# Patient Record
Sex: MALE | Race: BLACK OR AFRICAN AMERICAN | NOT HISPANIC OR LATINO | Employment: OTHER | URBAN - METROPOLITAN AREA
[De-identification: names, ages, dates, MRNs, and addresses within clinical notes are randomized per-mention and may not be internally consistent; named-entity substitution may affect disease eponyms.]

---

## 2018-01-13 NOTE — RESULT NOTES
Verified Results  (1) TISSUE EXAM [id number] 03:00PM Carlos Youssef     Test Name Result Flag Reference   LAB AP CASE REPORT (Report)     Surgical Pathology Report             Case: [id number]                   Authorizing Provider: Howard Romano MD      Collected:      11/09/2016 1500        Ordering Location:   [address] [place] [address]   Received:      11/10/2016 602 [facility]                               Pathologist:      Dorothy Padilla MD                              Specimen:  Stomach, Antrum gastritis   LAB AP FINAL DIAGNOSIS (Report)     A  Stomach, antrum, biopsy:        - Antral mucosa with chronic inactive gastritis  - No Helicobacter pylori organisms are identified on the   immunohistochemical stain, performed with an appropriate positive control         - No intestinal metaplasia, dysplasia or malignancy is   identified  Interpretation performed at , [address]    Electronically signed by Dorothy Padilla MD on 11/14/2016 at 10:01 AM   LAB AP SURGICAL ADDITIONAL INFORMATION (Report)     These tests were developed and their performance characteristics   determined by [facility] or [address] [place] [address]  They may not be cleared or approved by the [place]  Food and   Drug Administration  The FDA has determined that such clearance or   approval is not necessary  These tests are used for clinical purposes  They should not be regarded as investigational or for research  This   laboratory has been approved by [place] 88, designated as a high-complexity   laboratory and is qualified to perform these tests  LAB AP GROSS DESCRIPTION (Report)     A  The specimen is received in formalin, labeled with the patient's name   and hospital number, and is designated antrum gastritis  The specimen   consists of 2 rubbery, tan-brown tissue fragments measuring from 0 2 up to   0 4 cm in greatest dimension   Entirely submitted  One cassette  Note: The estimated total formalin fixation time based upon information   provided by the submitting clinician and the standard processing schedule   is 37 5 hours      SRS   LAB AP CLINICAL INFORMATION Hx of h pylori     Hx of h pylori

## 2018-01-16 NOTE — RESULT NOTES
Verified Results  CT Pelvis Without Contrast [id number] 12:22PM Queenie Shellbalaji     Test Name Result Flag Reference   CT Pelvis W/O (Report)     [address] [facility];;[place];[id number]   09/28/2015 1212   09/28/2015 1231   N/A     CT PELVIS WITHOUT IV CONTRAST     INDICATION- Postop right inguinal hernia repair September 23, 2015     COMPARISON- September 5, 2015     TECHNIQUE- CT examination of the pelvis was performed without   intravenous contrast  Examination was performed utilizing techniques   to minimize radiation dose, including the use of dose reduction   software  Axial, sagittal and coronal reformatted images were   submitted for interpretation  Enteric contrast was not administered     FINDINGS-   VISUALIZED KIDNEYS/URETERS-  Unremarkable  No hydronephrosis  No   urinary tract calculi  REPRODUCTIVE ORGANS- Mildly enlarged prostate  Seminal vesicles are   symmetric  Bilateral hydroceles  URINARY BLADDER- Incompletely distended  Mild wall thickening   suggesting trabeculation  APPENDIX- No findings to suggest appendicitis  VISUALIZED BOWEL- Limited evaluation of GI tract without oral contrast    Colonic diverticulosis without evidence of diverticulitis     ABDOMINOPELVIC CAVITY- Irregularly-shaped fluid collection deep to the   right inguinal hernia repair site image 3/28 measuring 3 0 x 1 7 cm  No free air  No adenopathy  VISUALIZED VESSELS- Unremarkable for patient's age  ABDOMINOPELVIC WALL/INGUINAL REGIONS- There is a lobular intermediate   attenuation collection or grouping of collections in the right groin   the largest of these measuring 4 4 x 2 9 cm, each with layering   hematocrit levels compatible with subacute hematoma  No intralesional   gas to confirm an abscess  Without intravenous contrast, further   evaluation is not possible  The largest collection appears to be   within or adjacent to the spermatic cord       OSSEOUS STRUCTURES- No acute fracture or destructive osseous lesion  IMPRESSION- Limited evaluation performed without oral or intravenous   contrast    Subacute hematoma in the right groin as described above  Probable   small seroma deep to the abdominal wall musculature  Superinfection   cannot be excluded by imaging       Discussed with consulting surgical resident at 1-40 PM     Transcribed on- [address] [employer] [facility], [address] [place] [address], [facility] DO   Electronically [address], [facility] DO   Released Date Time- 09/28/15 1345   ------------------------------------------------------------------------------   9724^MANUEL THURSTON   9724^MANUEL THURSTON

## 2018-09-05 ENCOUNTER — HOSPITAL ENCOUNTER (EMERGENCY)
Facility: HOSPITAL | Age: 48
Discharge: HOME | End: 2018-09-05
Attending: EMERGENCY MEDICINE
Payer: COMMERCIAL

## 2018-09-05 ENCOUNTER — APPOINTMENT (EMERGENCY)
Dept: CT IMAGING | Facility: HOSPITAL | Age: 48
End: 2018-09-05
Payer: COMMERCIAL

## 2018-09-05 VITALS
WEIGHT: 180 LBS | HEART RATE: 59 BPM | DIASTOLIC BLOOD PRESSURE: 80 MMHG | SYSTOLIC BLOOD PRESSURE: 131 MMHG | TEMPERATURE: 98.1 F | HEIGHT: 70 IN | RESPIRATION RATE: 18 BRPM | BODY MASS INDEX: 25.77 KG/M2 | OXYGEN SATURATION: 100 %

## 2018-09-05 DIAGNOSIS — K29.70 GASTRITIS: Primary | ICD-10-CM

## 2018-09-05 LAB
ALBUMIN SERPL BCP-MCNC: 3.8 G/DL (ref 3.5–5)
ALP SERPL-CCNC: 80 U/L (ref 46–116)
ALT SERPL W P-5'-P-CCNC: 20 U/L (ref 12–78)
ANION GAP SERPL CALCULATED.3IONS-SCNC: 4 MMOL/L (ref 4–13)
AST SERPL W P-5'-P-CCNC: 19 U/L (ref 5–45)
BASOPHILS # BLD AUTO: 0.02 THOUSANDS/ΜL (ref 0–0.1)
BASOPHILS NFR BLD AUTO: 0 % (ref 0–1)
BILIRUB DIRECT SERPL-MCNC: 0.26 MG/DL (ref 0–0.2)
BILIRUB SERPL-MCNC: 1.33 MG/DL (ref 0.2–1)
BUN SERPL-MCNC: 12 MG/DL (ref 5–25)
CALCIUM SERPL-MCNC: 9.4 MG/DL (ref 8.3–10.1)
CHLORIDE SERPL-SCNC: 103 MMOL/L (ref 100–108)
CO2 SERPL-SCNC: 31 MMOL/L (ref 21–32)
CREAT SERPL-MCNC: 1.24 MG/DL (ref 0.6–1.3)
EOSINOPHIL # BLD AUTO: 0.05 THOUSAND/ΜL (ref 0–0.61)
EOSINOPHIL NFR BLD AUTO: 1 % (ref 0–6)
ERYTHROCYTE [DISTWIDTH] IN BLOOD BY AUTOMATED COUNT: 14.1 % (ref 11.6–15.1)
GFR SERPL CREATININE-BSD FRML MDRD: 78 ML/MIN/1.73SQ M
GLUCOSE SERPL-MCNC: 121 MG/DL (ref 65–140)
HCT VFR BLD AUTO: 41.2 % (ref 36.5–49.3)
HGB BLD-MCNC: 13.1 G/DL (ref 12–17)
IMM GRANULOCYTES # BLD AUTO: 0.01 THOUSAND/UL (ref 0–0.2)
IMM GRANULOCYTES NFR BLD AUTO: 0 % (ref 0–2)
LIPASE SERPL-CCNC: 328 U/L (ref 73–393)
LYMPHOCYTES # BLD AUTO: 2.41 THOUSANDS/ΜL (ref 0.6–4.47)
LYMPHOCYTES NFR BLD AUTO: 43 % (ref 14–44)
MCH RBC QN AUTO: 27.1 PG (ref 26.8–34.3)
MCHC RBC AUTO-ENTMCNC: 31.8 G/DL (ref 31.4–37.4)
MCV RBC AUTO: 85 FL (ref 82–98)
MONOCYTES # BLD AUTO: 0.42 THOUSAND/ΜL (ref 0.17–1.22)
MONOCYTES NFR BLD AUTO: 7 % (ref 4–12)
NEUTROPHILS # BLD AUTO: 2.76 THOUSANDS/ΜL (ref 1.85–7.62)
NEUTS SEG NFR BLD AUTO: 49 % (ref 43–75)
NRBC BLD AUTO-RTO: 0 /100 WBCS
PLATELET # BLD AUTO: 301 THOUSANDS/UL (ref 149–390)
PMV BLD AUTO: 9.5 FL (ref 8.9–12.7)
POTASSIUM SERPL-SCNC: 4.2 MMOL/L (ref 3.5–5.3)
PROT SERPL-MCNC: 7.9 G/DL (ref 6.4–8.2)
RBC # BLD AUTO: 4.83 MILLION/UL (ref 3.88–5.62)
SODIUM SERPL-SCNC: 138 MMOL/L (ref 136–145)
WBC # BLD AUTO: 5.67 THOUSAND/UL (ref 4.31–10.16)

## 2018-09-05 PROCEDURE — 85025 COMPLETE CBC W/AUTO DIFF WBC: CPT | Performed by: PHYSICIAN ASSISTANT

## 2018-09-05 PROCEDURE — 74177 CT ABD & PELVIS W/CONTRAST: CPT

## 2018-09-05 PROCEDURE — 99284 EMERGENCY DEPT VISIT MOD MDM: CPT

## 2018-09-05 PROCEDURE — 36415 COLL VENOUS BLD VENIPUNCTURE: CPT | Performed by: PHYSICIAN ASSISTANT

## 2018-09-05 PROCEDURE — 96360 HYDRATION IV INFUSION INIT: CPT

## 2018-09-05 PROCEDURE — 82248 BILIRUBIN DIRECT: CPT | Performed by: PHYSICIAN ASSISTANT

## 2018-09-05 PROCEDURE — 96361 HYDRATE IV INFUSION ADD-ON: CPT

## 2018-09-05 PROCEDURE — 83690 ASSAY OF LIPASE: CPT | Performed by: PHYSICIAN ASSISTANT

## 2018-09-05 PROCEDURE — 80053 COMPREHEN METABOLIC PANEL: CPT | Performed by: PHYSICIAN ASSISTANT

## 2018-09-05 RX ORDER — ALBUTEROL SULFATE 2.5 MG/3ML
2.5 SOLUTION RESPIRATORY (INHALATION) ONCE
Status: COMPLETED | OUTPATIENT
Start: 2018-09-05 | End: 2018-09-05

## 2018-09-05 RX ORDER — NICOTINE POLACRILEX 4 MG/1
20 GUM, CHEWING ORAL DAILY
COMMUNITY
Start: 2017-12-26 | End: 2018-11-28 | Stop reason: ALTCHOICE

## 2018-09-05 RX ORDER — EMTRICITABINE AND TENOFOVIR DISOPROXIL FUMARATE 200; 300 MG/1; MG/1
1 TABLET, FILM COATED ORAL EVERY 24 HOURS
COMMUNITY
End: 2018-11-28 | Stop reason: SDUPTHER

## 2018-09-05 RX ORDER — HYDROXYZINE PAMOATE 50 MG/1
50 CAPSULE ORAL DAILY
Status: ON HOLD | COMMUNITY
Start: 2015-04-08 | End: 2019-06-13 | Stop reason: ALTCHOICE

## 2018-09-05 RX ORDER — PANTOPRAZOLE SODIUM 20 MG/1
20 TABLET, DELAYED RELEASE ORAL DAILY
Qty: 20 TABLET | Refills: 0 | Status: SHIPPED | OUTPATIENT
Start: 2018-09-05 | End: 2018-11-26

## 2018-09-05 RX ORDER — HYDROCHLOROTHIAZIDE 12.5 MG/1
12.5 CAPSULE, GELATIN COATED ORAL EVERY 24 HOURS
COMMUNITY
Start: 2017-09-07 | End: 2018-11-28 | Stop reason: ALTCHOICE

## 2018-09-05 RX ADMIN — IOHEXOL 100 ML: 350 INJECTION, SOLUTION INTRAVENOUS at 13:17

## 2018-09-05 RX ADMIN — SODIUM CHLORIDE 1000 ML: 0.9 INJECTION, SOLUTION INTRAVENOUS at 12:05

## 2018-09-05 RX ADMIN — ALBUTEROL SULFATE 2.5 MG: 2.5 SOLUTION RESPIRATORY (INHALATION) at 12:04

## 2018-09-05 NOTE — DISCHARGE INSTRUCTIONS
Gastritis   WHAT YOU NEED TO KNOW:   Gastritis is inflammation or irritation of the lining of your stomach  DISCHARGE INSTRUCTIONS:   Call 911 for any of the following:   · You develop chest pain or shortness of breath  Return to the emergency department if:   · You vomit blood  · You have black or bloody bowel movements  · You have severe stomach or back pain  Contact your healthcare provider if:   · You have a fever  · You have new or worsening symptoms, even after treatment  · You have questions or concerns about your condition or care  Medicines:   · Medicines  may be given to help treat a bacterial infection or decrease stomach acid  · Take your medicine as directed  Contact your healthcare provider if you think your medicine is not helping or if you have side effects  Tell him or her if you are allergic to any medicine  Keep a list of the medicines, vitamins, and herbs you take  Include the amounts, and when and why you take them  Bring the list or the pill bottles to follow-up visits  Carry your medicine list with you in case of an emergency  Manage or prevent gastritis:   · Do not smoke  Nicotine and other chemicals in cigarettes and cigars can make your symptoms worse and cause lung damage  Ask your healthcare provider for information if you currently smoke and need help to quit  E-cigarettes or smokeless tobacco still contain nicotine  Talk to your healthcare provider before you use these products  · Do not drink alcohol  Alcohol can prevent healing and make your gastritis worse  Talk to your healthcare provider if you need help to stop drinking  · Do not take NSAIDs or aspirin unless directed  These and similar medicines can cause irritation  If your healthcare provider says it is okay to take NSAIDs, take them with food  · Do not eat foods that cause irritation  Foods such as oranges and salsa can cause burning or pain  Eat a variety of healthy foods   Examples include fruits (not citrus), vegetables, low-fat dairy products, beans, whole-grain breads, and lean meats and fish  Try to eat small meals, and drink water with your meals  Do not eat for at least 3 hours before you go to bed  · Find ways to relax and decrease stress  Stress can increase stomach acid and make gastritis worse  Activities such as yoga, meditation, or listening to music can help you relax  Spend time with friends, or do things you enjoy  Follow up with your healthcare provider as directed: You may need ongoing tests or treatment, or referral to a gastroenterologist  Write down your questions so you remember to ask them during your visits  © 2017 [facility] [facility] Information is for End User's use only and may not be sold, redistributed or otherwise used for commercial purposes  All illustrations and images included in CareNotes® are the copyrighted property of [employer]  or Bryan Villasenor  The above information is an  only  It is not intended as medical advice for individual conditions or treatments  Talk to your doctor, nurse or pharmacist before following any medical regimen to see if it is safe and effective for you

## 2018-09-05 NOTE — ED PROVIDER NOTES
History  Chief Complaint   Patient presents with    Allergic Reaction     Pt is allergic to cod fish and ate it yesterday at dinner  Pt seen at [facility] yesterday and given benadryl and another med he can't remember  Is now c/o of nausea/vomiting x1  Patient is a 49-year-old male with a history of asthma, PUD, and fish allergy presenting to the emergency department for consistant non radiating achy abdominal pain for one day after consuming cod fish last night  Patient reports after consuming the cod fish, he went to an urgent care, "before I would have a reaction", where he was treated with Benadryl  Patient states that after consuming fish products, "my throat closes up and it is difficult for me to breathe "  This morning, patient states that he was nauseous which gave him SOB  After his 3 bouts of nonbloody diarrhea this morning along with some blood in his vomitus, SOB improved  Patient also reports that he is currently without his rescue inhaler for the last couple days  Patient denies fever, chills, and sick contacts  Denies recent travel or trauma  No urinary complaints  No chest pain  Patient is currently in no acute distress  History provided by:  Patient   used: No    Allergic Reaction   Presenting symptoms: no rash    Presenting symptoms comment:  Nausea, vomiting, and diarrhea  Severity:  Mild  Duration:  1 day  Prior allergic episodes:  Food/nut allergies (fish-derived products, pollen extract, watermelon, and nuts)  Context: food and nuts    Relieved by:  Nothing (Benedryl)  Worsened by:  Nothing  Ineffective treatments:  None tried      Prior to Admission Medications   Prescriptions Last Dose Informant Patient Reported? Taking?    DIVALPROEX SODIUM PO   Yes Yes   Sig: Take 25 mg by mouth 3 (three) times a day   METFORMIN HCL PO   Yes Yes   Sig: Take 850 mg by mouth 2 (two) times a day     Omeprazole 20 MG TBEC   Yes Yes   Sig: Take 20 mg by mouth daily emtricitabine-tenofovir (TRUVADA) 200-300 mg per tablet   Yes No   Sig: Take 1 tablet by mouth every 24 hours   hydrOXYzine pamoate (VISTARIL) 50 mg capsule   Yes Yes   Sig: Take 50 mg by mouth daily   hydrochlorothiazide (MICROZIDE) 12 5 mg capsule   Yes Yes   Sig: Take 12 5 mg by mouth every 24 hours   mirtazapine (REMERON) 15 mg tablet   Yes Yes   Sig: Take 15 mg by mouth daily at bedtime   risperiDONE (RisperDAL) 4 mg tablet   Yes Yes   Sig: Take 4 mg by mouth daily     rivaroxaban (XARELTO) 20 mg tablet   Yes Yes   Sig: Take 20 mg by mouth every 24 hours      Facility-Administered Medications: None       Past Medical History:   Diagnosis Date    Anemia     Anxiety     Asthma     Depression     Diabetes mellitus ([email or web address] 75 )     Epilepsy ([email or web address] 75 )     Hypertension     Psychiatric disorder     bipolar    Psychiatric illness     Psychosis     Sleep apnea        Past Surgical History:   Procedure Laterality Date    CYST REMOVAL      ESOPHAGOGASTRODUODENOSCOPY N/A 11/9/2016    Procedure: ESOPHAGOGASTRODUODENOSCOPY (EGD); Surgeon: Natali Garcia MD;  Location: [facility]; Service:     ESOPHAGOGASTRODUODENOSCOPY N/A 9/2/2016    Procedure: ESOPHAGOGASTRODUODENOSCOPY (EGD); Surgeon: Natali Garcia MD;  Location: [facility];   Service:     HERNIA REPAIR         Family History   Problem Relation Age of Onset    No Known Problems Mother     No Known Problems Father     No Known Problems Sister     No Known Problems Brother     No Known Problems Maternal Aunt     No Known Problems Paternal Aunt     No Known Problems Maternal Uncle     No Known Problems Paternal Uncle     No Known Problems Maternal Grandfather     No Known Problems Maternal Grandmother     No Known Problems Paternal Grandfather     No Known Problems Paternal Grandmother     No Known Problems Cousin     ADD / ADHD Neg Hx     Alcohol abuse Neg Hx     Anxiety disorder Neg Hx     Bipolar disorder Neg Hx     Dementia Neg Hx     Depression Neg Hx     Drug abuse Neg Hx     OCD Neg Hx     Paranoid behavior Neg Hx     Schizophrenia Neg Hx     Seizures Neg Hx     Self-Injury Neg Hx     Suicide Attempts Neg Hx      I have reviewed and agree with the history as documented  Social History   Substance Use Topics    Smoking status: Current Every Day Smoker     Packs/day: 0 20     Years: 15 00    Smokeless tobacco: Never Used      Comment: smokes occasionally    Alcohol use Yes      Comment: 5 or more cans beers daily        Review of Systems   Constitutional: Negative for activity change, chills, diaphoresis, fatigue and fever  HENT: Negative for congestion and sore throat  Respiratory: Negative for cough, chest tightness and shortness of breath  Cardiovascular: Negative for chest pain and palpitations  Gastrointestinal: Positive for abdominal pain, diarrhea and nausea  Negative for blood in stool, constipation, rectal pain and vomiting  Genitourinary: Negative for difficulty urinating, dysuria, frequency and hematuria  Musculoskeletal: Negative for back pain and gait problem  Skin: Negative for pallor and rash  Allergic/Immunologic: Negative for environmental allergies and food allergies  Neurological: Negative for dizziness, numbness and headaches  Psychiatric/Behavioral: Negative for agitation and confusion  All other systems reviewed and are negative  Physical Exam  Physical Exam   Constitutional: He is oriented to person, place, and time  He appears well-developed and well-nourished  HENT:   Head: Normocephalic and atraumatic  Eyes: Conjunctivae and EOM are normal  Pupils are equal, round, and reactive to light  Neck: Trachea normal, normal range of motion and phonation normal  Neck supple  No JVD present  No tracheal deviation present  Cardiovascular: Normal rate, regular rhythm, normal heart sounds, intact distal pulses and normal pulses      Pulses:       Radial pulses are 2+ on the right side, and 2+ on the left side  Pulmonary/Chest: Effort normal  He has wheezes in the right middle field, the right lower field, the left middle field and the left lower field  Abdominal: Soft  Normal appearance  Bowel sounds are increased  There is tenderness in the right upper quadrant  There is positive Rand's sign  Musculoskeletal: Normal range of motion  Neurological: He is alert and oriented to person, place, and time  He has normal strength  Skin: Skin is warm  Capillary refill takes 2 to 3 seconds  No rash noted  Psychiatric: He has a normal mood and affect   His speech is normal and behavior is normal  Judgment and thought content normal  Cognition and memory are normal        Vital Signs  ED Triage Vitals [09/05/18 1104]   Temperature Pulse Respirations Blood Pressure SpO2   98 1 °F (36 7 °C) 59 18 131/80 100 %      Temp Source Heart Rate Source Patient Position - Orthostatic VS BP Location FiO2 (%)   Temporal Monitor Lying Left arm --      Pain Score       No Pain           Vitals:    09/05/18 1104   BP: 131/80   Pulse: 59   Patient Position - Orthostatic VS: Lying       Visual Acuity      ED Medications  Medications   albuterol inhalation solution 2 5 mg (2 5 mg Nebulization Given 9/5/18 1204)   sodium chloride 0 9 % bolus 1,000 mL (1,000 mL Intravenous New Bag 9/5/18 1205)   iohexol (OMNIPAQUE) 350 MG/ML injection (MULTI-DOSE) 100 mL (100 mL Intravenous Given 9/5/18 1317)       Diagnostic Studies  Results Reviewed     Procedure Component Value Units Date/Time    CMP [[id number]]  (Abnormal) Collected:  09/05/18 1202    Lab Status:  Final result Specimen:  Blood from Arm, Right Updated:  09/05/18 1227     Sodium 138 mmol/L      Potassium 4 2 mmol/L      Chloride 103 mmol/L      CO2 31 mmol/L      ANION GAP 4 mmol/L      BUN 12 mg/dL      Creatinine 1 24 mg/dL      Glucose 121 mg/dL      Calcium 9 4 mg/dL      AST 19 U/L      ALT 20 U/L      Alkaline Phosphatase 80 U/L      Total Protein 7 9 g/dL      Albumin 3 8 g/dL      Total Bilirubin 1 33 (H) mg/dL      eGFR 78 ml/min/1 73sq m     Narrative:         National Kidney Disease Education Program recommendations are as follows:  GFR calculation is accurate only with a steady state creatinine  Chronic Kidney disease less than 60 ml/min/1 73 sq  meters  Kidney failure less than 15 ml/min/1 73 sq  meters  Lipase [[id number]]  (Normal) Collected:  09/05/18 1202    Lab Status:  Final result Specimen:  Blood from Arm, Right Updated:  09/05/18 1227     Lipase 328 u/L     Bilirubin, direct [[id number]]  (Abnormal) Collected:  09/05/18 1202    Lab Status:  Final result Specimen:  Blood from Arm, Right Updated:  09/05/18 1227     Bilirubin, Direct 0 26 (H) mg/dL     CBC and differential [[id number]] Collected:  09/05/18 1202    Lab Status:  Final result Specimen:  Blood from Arm, Right Updated:  09/05/18 1223     WBC 5 67 Thousand/uL      RBC 4 83 Million/uL      Hemoglobin 13 1 g/dL      Hematocrit 41 2 %      MCV 85 fL      MCH 27 1 pg      MCHC 31 8 g/dL      RDW 14 1 %      MPV 9 5 fL      Platelets 417 Thousands/uL      nRBC 0 /100 WBCs      Neutrophils Relative 49 %      Immat GRANS % 0 %      Lymphocytes Relative 43 %      Monocytes Relative 7 %      Eosinophils Relative 1 %      Basophils Relative 0 %      Neutrophils Absolute 2 76 Thousands/µL      Immature Grans Absolute 0 01 Thousand/uL      Lymphocytes Absolute 2 41 Thousands/µL      Monocytes Absolute 0 42 Thousand/µL      Eosinophils Absolute 0 05 Thousand/µL      Basophils Absolute 0 02 Thousands/µL                  CT abdomen pelvis with contrast   Final Result by Anum Pizarro MD (09/05 1350)      No acute abnormality in the abdomen or pelvis  Workstation performed: [id number]                    Procedures  Procedures       Phone Contacts  ED Phone Contact    ED Course  ED Course as of Sep 05 1409   Wed Sep 05, 2018   1234 Patient wheezing improved after albuterol treatment    Patient asked me for something to eat  I declined at this point  1 Patient brother and girlfriend came to visit                                   MDM  Number of Diagnoses or Management Options  Diagnosis management comments: Differential diagnosis encompasses but is not limited to:  Cholecystitis  Gastroenteritis  Peptic ulcer disease  Gastritis  Pancreatitis     Clinical labs normal  CT Abdomen/Pelvis with contrast - Impression - No acute abnormality in the abdomen or pelvis  Patient counseled on avoiding allergic trigger foods  F/u with GI  F/U with PCP  F/u with ED if problems persist or exacerbate                  Amount and/or Complexity of Data Reviewed  Clinical lab tests: ordered and reviewed  Tests in the radiology section of CPT®: ordered and reviewed      CritCare Time    Disposition  Final diagnoses:   Gastritis     Time reflects when diagnosis was documented in both MDM as applicable and the Disposition within this note     Time User Action Codes Description Comment    9/5/2018  2:02 PM Kinjal Caldwell Add [K29 70] Gastritis       ED Disposition     ED Disposition Condition Comment    Discharge  Al Yates discharge to home/self care      Condition at discharge: Stable        Follow-up Information     Follow up With Specialties Details Why Contact Info Additional [address] [facility] Emergency Department Emergency Medicine Go to As needed [address] [facility] ED, [address] [facility], [address]    Sandy Garcia MD  In 1 week for further evaluation of symptoms [address] [facility]       Christina Montano MD Gastroenterology In 2 days for further evaluation of symptoms [address] [facility] [address] [place] [facility] [address]  [phone]             Patient's Medications   Discharge Prescriptions    PANTOPRAZOLE (PROTONIX) 20 MG TABLET    Take 1 tablet (20 mg total) by mouth daily       Start Date: 9/5/2018  End Date: --       Order Dose: 20 mg       Quantity: 20 tablet    Refills: 0     No discharge procedures on file      ED Provider  Electronically Signed by           Luis Quinonez PA-C  09/05/18 2509 no tinnitus

## 2018-09-21 ENCOUNTER — TELEPHONE (OUTPATIENT)
Dept: FAMILY MEDICINE CLINIC | Facility: CLINIC | Age: 48
End: 2018-09-21

## 2018-09-21 DIAGNOSIS — I82.4Y1 DVT, LOWER EXTREMITY, PROXIMAL, ACUTE, RIGHT (HCC): ICD-10-CM

## 2018-09-21 DIAGNOSIS — D68.59 PROTEIN S DEFICIENCY (HCC): Primary | ICD-10-CM

## 2018-11-26 ENCOUNTER — TELEPHONE (OUTPATIENT)
Dept: FAMILY MEDICINE CLINIC | Facility: CLINIC | Age: 48
End: 2018-11-26

## 2018-11-26 ENCOUNTER — HOSPITAL ENCOUNTER (EMERGENCY)
Facility: HOSPITAL | Age: 48
Discharge: HOME | End: 2018-11-26
Attending: EMERGENCY MEDICINE | Admitting: EMERGENCY MEDICINE
Payer: COMMERCIAL

## 2018-11-26 VITALS
SYSTOLIC BLOOD PRESSURE: 114 MMHG | DIASTOLIC BLOOD PRESSURE: 72 MMHG | WEIGHT: 171 LBS | OXYGEN SATURATION: 98 % | RESPIRATION RATE: 18 BRPM | TEMPERATURE: 97.6 F | HEART RATE: 78 BPM | BODY MASS INDEX: 24.54 KG/M2

## 2018-11-26 DIAGNOSIS — G62.9 PERIPHERAL NEUROPATHY: ICD-10-CM

## 2018-11-26 DIAGNOSIS — E11.9 TYPE 2 DIABETES MELLITUS WITHOUT COMPLICATION, WITHOUT LONG-TERM CURRENT USE OF INSULIN (HCC): ICD-10-CM

## 2018-11-26 DIAGNOSIS — R55 NEAR SYNCOPE: Primary | ICD-10-CM

## 2018-11-26 LAB
ALBUMIN SERPL BCP-MCNC: 4.1 G/DL (ref 3–5.2)
ALP SERPL-CCNC: 65 U/L (ref 43–122)
ALT SERPL W P-5'-P-CCNC: 26 U/L (ref 9–52)
AMPHETAMINES SERPL QL SCN: POSITIVE
ANION GAP SERPL CALCULATED.3IONS-SCNC: 8 MMOL/L (ref 5–14)
AST SERPL W P-5'-P-CCNC: 28 U/L (ref 17–59)
ATRIAL RATE: 83 BPM
BACTERIA UR QL AUTO: ABNORMAL /HPF
BARBITURATES UR QL: NEGATIVE
BASOPHILS # BLD AUTO: 0 THOUSANDS/ΜL (ref 0–0.1)
BASOPHILS NFR BLD AUTO: 1 % (ref 0–1)
BENZODIAZ UR QL: NEGATIVE
BILIRUB SERPL-MCNC: 0.6 MG/DL
BILIRUB UR QL STRIP: NEGATIVE
BUN SERPL-MCNC: 17 MG/DL (ref 5–25)
CALCIUM SERPL-MCNC: 9.3 MG/DL (ref 8.4–10.2)
CHLORIDE SERPL-SCNC: 101 MMOL/L (ref 97–108)
CLARITY UR: CLEAR
CO2 SERPL-SCNC: 26 MMOL/L (ref 22–30)
COCAINE UR QL: NEGATIVE
COLOR UR: YELLOW
CREAT SERPL-MCNC: 1 MG/DL (ref 0.7–1.5)
EOSINOPHIL # BLD AUTO: 0.1 THOUSAND/ΜL (ref 0–0.4)
EOSINOPHIL NFR BLD AUTO: 2 % (ref 0–6)
ERYTHROCYTE [DISTWIDTH] IN BLOOD BY AUTOMATED COUNT: 14 %
GFR SERPL CREATININE-BSD FRML MDRD: 101 ML/MIN/1.73SQ M
GLUCOSE SERPL-MCNC: 104 MG/DL (ref 70–99)
GLUCOSE SERPL-MCNC: 86 MG/DL (ref 70–99)
GLUCOSE UR STRIP-MCNC: NEGATIVE MG/DL
HCT VFR BLD AUTO: 43.6 % (ref 41–53)
HGB BLD-MCNC: 13.7 G/DL (ref 13.5–17.5)
HGB UR QL STRIP.AUTO: NEGATIVE
KETONES UR STRIP-MCNC: NEGATIVE MG/DL
LEUKOCYTE ESTERASE UR QL STRIP: 25
LYMPHOCYTES # BLD AUTO: 2.8 THOUSANDS/ΜL (ref 0.5–4)
LYMPHOCYTES NFR BLD AUTO: 54 % (ref 20–50)
MCH RBC QN AUTO: 27 PG (ref 26–34)
MCHC RBC AUTO-ENTMCNC: 31.5 G/DL (ref 31–36)
MCV RBC AUTO: 86 FL (ref 80–100)
METHADONE UR QL: NEGATIVE
MONOCYTES # BLD AUTO: 0.4 THOUSAND/ΜL (ref 0.2–0.9)
MONOCYTES NFR BLD AUTO: 9 % (ref 1–10)
NEUTROPHILS # BLD AUTO: 1.8 THOUSANDS/ΜL (ref 1.8–7.8)
NEUTS SEG NFR BLD AUTO: 36 % (ref 45–65)
NITRITE UR QL STRIP: NEGATIVE
NON-SQ EPI CELLS URNS QL MICRO: ABNORMAL /HPF
OPIATES UR QL SCN: NEGATIVE
P AXIS: 64 DEGREES
PCP UR QL: NEGATIVE
PH UR STRIP.AUTO: 6 [PH] (ref 4.5–8)
PLATELET # BLD AUTO: 316 THOUSANDS/UL (ref 150–450)
PMV BLD AUTO: 8.3 FL (ref 8.9–12.7)
POTASSIUM SERPL-SCNC: 4.6 MMOL/L (ref 3.6–5)
PR INTERVAL: 148 MS
PROT SERPL-MCNC: 7.2 G/DL (ref 5.9–8.4)
PROT UR STRIP-MCNC: NEGATIVE MG/DL
QRS AXIS: 68 DEGREES
QRSD INTERVAL: 76 MS
QT INTERVAL: 346 MS
QTC INTERVAL: 406 MS
RBC # BLD AUTO: 5.08 MILLION/UL (ref 4.5–5.9)
RBC #/AREA URNS AUTO: ABNORMAL /HPF
SODIUM SERPL-SCNC: 135 MMOL/L (ref 137–147)
SP GR UR STRIP.AUTO: 1.01 (ref 1–1.04)
T WAVE AXIS: 63 DEGREES
THC UR QL: NEGATIVE
UROBILINOGEN UA: NEGATIVE MG/DL
VENTRICULAR RATE: 83 BPM
WBC # BLD AUTO: 5.2 THOUSAND/UL (ref 4.5–11)
WBC #/AREA URNS AUTO: ABNORMAL /HPF

## 2018-11-26 PROCEDURE — 99284 EMERGENCY DEPT VISIT MOD MDM: CPT

## 2018-11-26 PROCEDURE — 96360 HYDRATION IV INFUSION INIT: CPT

## 2018-11-26 PROCEDURE — 80053 COMPREHEN METABOLIC PANEL: CPT | Performed by: EMERGENCY MEDICINE

## 2018-11-26 PROCEDURE — 36415 COLL VENOUS BLD VENIPUNCTURE: CPT | Performed by: EMERGENCY MEDICINE

## 2018-11-26 PROCEDURE — 93010 ELECTROCARDIOGRAM REPORT: CPT | Performed by: INTERNAL MEDICINE

## 2018-11-26 PROCEDURE — 85025 COMPLETE CBC W/AUTO DIFF WBC: CPT | Performed by: EMERGENCY MEDICINE

## 2018-11-26 PROCEDURE — 80061 LIPID PANEL: CPT

## 2018-11-26 PROCEDURE — 82043 UR ALBUMIN QUANTITATIVE: CPT

## 2018-11-26 PROCEDURE — 93005 ELECTROCARDIOGRAM TRACING: CPT

## 2018-11-26 PROCEDURE — 82948 REAGENT STRIP/BLOOD GLUCOSE: CPT

## 2018-11-26 PROCEDURE — 82570 ASSAY OF URINE CREATININE: CPT

## 2018-11-26 PROCEDURE — 81001 URINALYSIS AUTO W/SCOPE: CPT | Performed by: EMERGENCY MEDICINE

## 2018-11-26 PROCEDURE — 84443 ASSAY THYROID STIM HORMONE: CPT

## 2018-11-26 PROCEDURE — 80307 DRUG TEST PRSMV CHEM ANLYZR: CPT | Performed by: EMERGENCY MEDICINE

## 2018-11-26 RX ORDER — GABAPENTIN 100 MG/1
300 CAPSULE ORAL ONCE
Status: COMPLETED | OUTPATIENT
Start: 2018-11-26 | End: 2018-11-26

## 2018-11-26 RX ADMIN — GABAPENTIN 300 MG: 100 CAPSULE ORAL at 12:23

## 2018-11-26 RX ADMIN — SODIUM CHLORIDE 1000 ML: 9 INJECTION, SOLUTION INTRAVENOUS at 10:44

## 2018-11-26 NOTE — DISCHARGE INSTRUCTIONS
Syncope   AMBULATORY CARE:   Syncope  is also called fainting or passing out  Syncope is a sudden, temporary loss of consciousness, followed by a fall from a standing or sitting position  Syncope ranges from not serious to a sign of a more serious condition that needs to be treated  You can control some health conditions that cause syncope  Your healthcare providers can help you create a plan to manage syncope and prevent episodes  Signs and symptoms that may occur before syncope include the following:   · Cold, clammy, and sweaty skin     · Fast breathing and a racing, pounding heartbeat     · Feeling more tired than usual     · Nausea, a warm feeling, and sweating    · A headache, or feeling lightheaded or dizzy    · Tingling sensation or numbness     · Spots in front of your eyes, blurred vision, or double vision  Seek care immediately if:   · You are bleeding because you hit your head when you fainted  · You suddenly have double vision, difficulty speaking, numbness, and cannot move your arms or legs  · You have chest pain and trouble breathing  · You vomit blood or material that looks like coffee grounds  · You see blood in your bowel movement  Contact your healthcare provider if:   · You have new or worsening symptoms  · You have another syncope episode  · You have a headache, fast heartbeat, or feel too dizzy to stand up  · You have questions or concerns about your condition or care  Treatment for syncope  depends on the cause of your syncope  To prevent syncope from happening again, you may  need any of the following:  · Medicines  may be needed to treat any medical conditions that are causing your syncope  These may include medicines to help your heart pump strongly and regularly  Your healthcare provider may also make changes to any medicines that are causing syncope  · Tilt training  involves training yourself to stand for 10 to 30 minutes each day against a wall   This helps your body decrease the effects of posture changes and reduces the number of fainting spells  Manage syncope:   · Keep a record of your syncope episodes  Include your symptoms and your activity before and after the episode  The record can help your healthcare provider find the cause of your syncope and help you manage episodes  · Sit or lie down when needed  This includes when you feel dizzy, your throat is getting tight, and your vision changes  Raise your legs above the level of your heart  · Take slow, deep breaths if you start to breathe faster with anxiety or fear  This can help decrease dizziness and the feeling that you might faint  · Check your blood pressure often  This is important if you take medicine to lower your blood pressure  Check your blood pressure when you are lying down and when you are standing  Ask how often to check during the day  Keep a record of your blood pressure numbers  Your healthcare provider may use the record to help plan your treatment  Prevent a syncope episode:   · Move slowly and let yourself get used to one position before you move to another position  This is very important when you change from a lying or sitting position to a standing position  Take some deep breaths before you stand up from a lying position  Stand up slowly  Sudden movements may cause a fainting spell  Sit on the side of the bed or couch for a few minutes before you stand up  If you are on bedrest, try to be upright for about 2 hours each day, or as directed  Do not lock your legs if you are standing for a long period of time  Move your legs and bend your knees to keep blood flowing  · Follow your healthcare provider's recommendations  Your provider may  recommend that you drink more liquids to prevent dehydration  You may also need to have more salt to keep your blood pressure from dropping too low and causing syncope   Your provider will tell you how much liquid and sodium to have each day  · Watch for signs of low blood sugar  These include hunger, nervousness, sweating, and fast or fluttery heartbeats  Talk with your healthcare provider about ways to keep your blood sugar level steady  · Do not strain if you are constipated  You may faint if you strain to have a bowel movement  Walking is the best way to get your bowels moving  Eat foods high in fiber to make it easier to have a bowel movement  Good examples are high-fiber cereals, beans, vegetables, and whole-grain breads  Prune juice may help make bowel movements softer  · Be careful in hot weather  Heat can cause a syncope episode  Limit activity done outside on hot days  Physical activity in hot weather can lead to dehydration  This can cause an episode  Follow up with your healthcare provider as directed:  Write down your questions so you remember to ask them during your visits  © 2017 [employer]  Information is for End User's use only and may not be sold, redistributed or otherwise used for commercial purposes  All illustrations and images included in CareNotes® are the copyrighted property of [employer]  or Bryan Vill[employer]enor  The above information is an  only  It is not intended as medical advice for individual conditions or treatments  Talk to your doctor, nurse or pharmacist before following any medical regimen to see if it is safe and effective for you

## 2018-11-26 NOTE — ED PROVIDER NOTES
History  Chief Complaint   Patient presents with    Foot Pain     history of DM, states over the weekend has been having tingingly and pin pricking sensations to feet, starts at heels and goes to toes; today at work reports began to feel light headed and have [facility] report blood sugar 132  History provided by:  Patient  Dizziness   Quality:  Room spinning  Severity:  Moderate  Onset quality:  Gradual  Timing:  Constant  Progression:  Worsening  Chronicity:  New  Context: bending over and standing up    Context: not with loss of consciousness    Relieved by:  Nothing  Worsened by:  Nothing  Ineffective treatments:  None tried  Associated symptoms: nausea and weakness    Associated symptoms: no chest pain, no diarrhea, no headaches, no shortness of breath, no tinnitus, no vision changes and no vomiting        Prior to Admission Medications   Prescriptions Last Dose Informant Patient Reported? Taking?    DIVALPROEX SODIUM PO   Yes Yes   Sig: Take 25 mg by mouth 3 (three) times a day   METFORMIN HCL PO   Yes Yes   Sig: Take 850 mg by mouth 2 (two) times a day     Omeprazole 20 MG TBEC   Yes Yes   Sig: Take 20 mg by mouth daily   emtricitabine-tenofovir (TRUVADA) 200-300 mg per tablet   Yes Yes   Sig: Take 1 tablet by mouth every 24 hours   hydrOXYzine pamoate (VISTARIL) 50 mg capsule   Yes Yes   Sig: Take 50 mg by mouth daily   hydrochlorothiazide (MICROZIDE) 12 5 mg capsule   Yes Yes   Sig: Take 12 5 mg by mouth every 24 hours   mirtazapine (REMERON) 15 mg tablet   Yes Yes   Sig: Take 15 mg by mouth daily at bedtime   risperiDONE (RisperDAL) 4 mg tablet   Yes Yes   Sig: Take 4 mg by mouth daily     rivaroxaban (XARELTO) 20 mg tablet   No Yes   Sig: Take 1 tablet (20 mg total) by mouth every 24 hours      Facility-Administered Medications: None       Past Medical History:   Diagnosis Date    Anemia     Anxiety     Asthma     Depression     [facility]     [facility]     Hypertension     Psychiatric disorder     bipolar    Psychiatric illness     Psychosis [facility] [address] [facility]     Sleep apnea        Past Surgical History:   Procedure Laterality Date    CYST REMOVAL      ESOPHAGOGASTRODUODENOSCOPY N/A 11/9/2016    Procedure: ESOPHAGOGASTRODUODENOSCOPY (EGD); Surgeon: Ladan Campos MD;  Location:  GI LAB; Service:     ESOPHAGOGASTRODUODENOSCOPY N/A 9/2/2016    Procedure: ESOPHAGOGASTRODUODENOSCOPY (EGD); Surgeon: Ladan Campos MD;  Location:  GI LAB; Service:     HERNIA REPAIR         Family History   Problem Relation Age of Onset    No Known Problems Mother     No Known Problems Father     No Known Problems Sister     No Known Problems Brother     No Known Problems Maternal Aunt     No Known Problems Paternal Aunt     No Known Problems Maternal Uncle     No Known Problems Paternal Uncle     No Known Problems Maternal Grandfather     No Known Problems Maternal Grandmother     No Known Problems Paternal Grandfather     No Known Problems Paternal Grandmother     No Known Problems Cousin     ADD / ADHD Neg Hx     Alcohol abuse Neg Hx     Anxiety disorder Neg Hx     Bipolar disorder Neg Hx     Dementia Neg Hx     Depression Neg Hx     Drug abuse Neg Hx     OCD Neg Hx     Paranoid behavior Neg Hx     Schizophrenia Neg Hx     Seizures Neg Hx     Self-Injury Neg Hx     Suicide Attempts Neg Hx      I have reviewed and agree with the history as documented  Social History   Substance Use Topics    Smoking status: Current Every Day Smoker     Packs/day: 0 20     Years: 15 00    Smokeless tobacco: Never Used      Comment: smokes occasionally    Alcohol use Yes      Comment: 5 or more cans beers daily        Review of Systems   Constitutional: Negative for chills and fever  HENT: Negative for rhinorrhea, sore throat, tinnitus and trouble swallowing  Eyes: Negative for pain  Respiratory: Negative for cough, shortness of breath, wheezing and stridor  Cardiovascular: Negative for chest pain and leg swelling  Gastrointestinal: Positive for nausea  Negative for abdominal pain, diarrhea and vomiting  Endocrine: Negative for polyuria  Genitourinary: Negative for dysuria, flank pain and urgency  Musculoskeletal: Negative for joint swelling, myalgias and neck stiffness  Skin: Negative for rash  Allergic/Immunologic: Negative for immunocompromised state  Neurological: Positive for dizziness and weakness  Negative for syncope, numbness and headaches  Psychiatric/Behavioral: Negative for confusion and suicidal ideas  All other systems reviewed and are negative  Physical Exam  Physical Exam   Constitutional: He is oriented to person, place, and time  He appears well-developed and well-nourished  HENT:   Head: Normocephalic and atraumatic  Eyes: Pupils are equal, round, and reactive to light  EOM are normal    Neck: Normal range of motion  Neck supple  Cardiovascular: Normal rate and regular rhythm  Exam reveals no friction rub  No murmur heard  Pulmonary/Chest: Breath sounds normal  No respiratory distress  He has no wheezes  He has no rales  Abdominal: Soft  Bowel sounds are normal  He exhibits no distension  There is no tenderness  Musculoskeletal: Normal range of motion  He exhibits no edema or tenderness  Neurological: He is alert and oriented to person, place, and time  GCS 15  AAOx4  No focal neuro deficits  CN II-XII intact  PERRL  EOMI  Leonard Whitmire No pronator drift   strength 5/5 bilaterally  B/L UE strength 5/5 throughout  Finger to nose normal  Cerebellar function normal  Ambulates without difficulty  B/L LE strength 5/5 throughout  Gross sensation to b/l upper and lower extremities intact  Skin: Skin is warm  No rash noted  Psychiatric: He has a normal mood and affect  Nursing note and vitals reviewed        Vital Signs  ED Triage Vitals [11/26/18 1004]   Temperature Pulse Respirations Blood Pressure SpO2   97 6 °F (36 4 °C) 87 18 148/72 98 %      Temp Source Heart Rate Source Patient Position - Orthostatic VS BP Location FiO2 (%)   Tymp Core Monitor Lying Left arm --      Pain Score       --           Vitals:    11/26/18 1004 11/26/18 1232   BP: 148/72 114/72   Pulse: 87 78   Patient Position - Orthostatic VS: Lying Lying       Visual Acuity      ED Medications  Medications   sodium chloride 0 9 % bolus 1,000 mL (0 mL Intravenous Stopped 11/26/18 1144)   gabapentin (NEURONTIN) capsule 300 mg (300 mg Oral Given 11/26/18 1223)       Diagnostic Studies  Results Reviewed     Procedure Component Value Units Date/Time    Rapid drug screen, urine [[id number]]  (Abnormal) Collected:  11/26/18 1114    Lab Status:  Final result Specimen:  Urine from Urine, Clean Catch Updated:  11/26/18 1221     Amph/Meth UR Positive (A)     Barbiturate Ur Negative     Benzodiazepine Urine Negative     Cocaine Urine Negative     Methadone Urine Negative     Opiate Urine Negative     PCP Ur Negative     THC Urine Negative    Narrative:         FOR MEDICAL PURPOSES ONLY  IF CONFIRMATION NEEDED PLEASE CONTACT THE LAB WITHIN 5 DAYS      Drug Screen Cutoff Levels:  AMPHETAMINE/METHAMPHETAMINES  1000 ng/mL  BARBITURATES     200 ng/mL  BENZODIAZEPINES     200 ng/mL  COCAINE      300 ng/mL  METHADONE      300 ng/mL  OPIATES      300 ng/mL  PHENCYCLIDINE     25 ng/mL  THC       50 ng/mL    Urine Microscopic [[id number]]  (Abnormal) Collected:  11/26/18 1114    Lab Status:  Final result Specimen:  Urine from Urine, Clean Catch Updated:  11/26/18 1158     RBC, UA 0-1 (A) /hpf      WBC, UA 0-1 (A) /hpf      Epithelial Cells Occasional /hpf      Bacteria, UA None Seen /hpf     UA w Reflex to Microscopic [[id number]]  (Abnormal) Collected:  11/26/18 1114    Lab Status:  Final result Specimen:  Urine from Urine, Clean Catch Updated:  11/26/18 1142     Color, UA Yellow     Clarity, UA Clear     Specific Gravity, UA 1 015     pH, UA 6 0     Leukocytes, UA 25 0 (A) Nitrite, UA Negative     Protein, UA Negative mg/dl      Glucose, UA Negative mg/dl      Ketones, UA Negative mg/dl      Bilirubin, UA Negative     Blood, UA Negative     UROBILINOGEN UA Negative mg/dL     Comprehensive metabolic panel [[id number]]  (Abnormal) Collected:  11/26/18 1043    Lab Status:  Final result Specimen:  Blood from Arm, Left Updated:  11/26/18 1104     Sodium 135 (L) mmol/L      Potassium 4 6 mmol/L      Chloride 101 mmol/L      CO2 26 mmol/L      ANION GAP 8 mmol/L      BUN 17 mg/dL      Creatinine 1 00 mg/dL      Glucose 104 (H) mg/dL      Calcium 9 3 mg/dL      AST 28 U/L      ALT 26 U/L      Alkaline Phosphatase 65 U/L      Total Protein 7 2 g/dL      Albumin 4 1 g/dL      Total Bilirubin 0 60 mg/dL      eGFR 101 ml/min/1 73sq m     Narrative:         National Kidney Disease Education Program recommendations are as follows:  GFR calculation is accurate only with a steady state creatinine  Chronic Kidney disease less than 60 ml/min/1 73 sq  meters  Kidney failure less than 15 ml/min/1 73 sq  meters      CBC and differential [[id number]]  (Abnormal) Collected:  11/26/18 1043    Lab Status:  Final result Specimen:  Blood from Arm, Left Updated:  11/26/18 1054     WBC 5 20 Thousand/uL      RBC 5 08 Million/uL      Hemoglobin 13 7 g/dL      Hematocrit 43 6 %      MCV 86 fL      MCH 27 0 pg      MCHC 31 5 g/dL      RDW 14 0 %      MPV 8 3 (L) fL      Platelets 690 Thousands/uL      Neutrophils Relative 36 (L) %      Lymphocytes Relative 54 (H) %      Monocytes Relative 9 %      Eosinophils Relative 2 %      Basophils Relative 1 %      Neutrophils Absolute 1 80 Thousands/µL      Lymphocytes Absolute 2 80 Thousands/µL      Monocytes Absolute 0 40 Thousand/µL      Eosinophils Absolute 0 10 Thousand/µL      Basophils Absolute 0 00 Thousands/µL     Fingerstick Glucose (POCT) [[id number]]  (Normal) Collected:  11/26/18 1038    Lab Status:  Final result Updated:  11/26/18 1049     POC Glucose 86 mg/dl No orders to display              Procedures  Procedures       Phone Contacts  ED Phone Contact    ED Course                               MDM  Number of Diagnoses or Management Options  Near syncope: new and requires workup  Peripheral neuropathy: new and requires workup  Diagnosis management comments: 48M with noted sxs of neuropathy and near syncope  Dizzy  + caffeine intake   Workup in ED unremarkable, pt feeling improved at this time after ivfs  Plan dc home  Pt re-examined and evaluated after testing and treatment  Spoke with the patient and feeling improved and sxs have resolved  Will discharge home with close f/u with pcp and instructed to return to the ED if sxs worsen or continue  Pt agrees with the plan for discharge and feels comfortable to go home with proper f/u  Advised to return for worsening or additional problems  Diagnostic tests were reviewed and questions answered  Diagnosis, care plan and treatment options were discussed  The patient understand instructions and will follow up as directed           Amount and/or Complexity of Data Reviewed  Clinical lab tests: reviewed and ordered  Tests in the radiology section of CPT®: ordered and reviewed  Review and summarize past medical records: yes  Independent visualization of images, tracings, or specimens: yes (All labs reviewed and utilized in the medical decision making process    All radiology studies independently viewed by me and interpreted by the radiologist   )      CritCare Time    Disposition  Final diagnoses:   Near syncope   Peripheral neuropathy     Time reflects when diagnosis was documented in both MDM as applicable and the Disposition within this note     Time User Action Codes Description Comment    11/26/2018 12:15 PM Ayaka Medina [R55] Near syncope     11/26/2018 12:15 PM Ayaka Medina [G62 9] Peripheral neuropathy       ED Disposition     ED Disposition Condition Comment    Discharge  Jose David Prime discharge to home/self care  Condition at discharge: Stable        Follow-up Information     Follow up With Specialties Details Why [address] MD [place]  Call in 2 days If symptoms worsen [address]  [phone]            Discharge Medication List as of 11/26/2018 12:15 PM      CONTINUE these medications which have NOT CHANGED    Details   DIVALPROEX SODIUM PO Take 25 mg by mouth 3 (three) times a day, Historical Med      emtricitabine-tenofovir (TRUVADA) 200-300 mg per tablet Take 1 tablet by mouth every 24 hours, Historical Med      hydrochlorothiazide (MICROZIDE) 12 5 mg capsule Take 12 5 mg by mouth every 24 hours, Starting Thu 9/7/2017, Historical Med      hydrOXYzine pamoate (VISTARIL) 50 mg capsule Take 50 mg by mouth daily, Starting Wed 4/8/2015, Historical Med      METFORMIN HCL PO Take 850 mg by mouth 2 (two) times a day  , Historical Med      mirtazapine (REMERON) 15 mg tablet Take 15 mg by mouth daily at bedtime, Until Discontinued, Historical Med      Omeprazole 20 MG TBEC Take 20 mg by mouth daily, Starting Tue 12/26/2017, Historical Med      risperiDONE (RisperDAL) 4 mg tablet Take 4 mg by mouth daily  , Historical Med      rivaroxaban (XARELTO) 20 mg tablet Take 1 tablet (20 mg total) by mouth every 24 hours, Starting Fri 9/21/2018, Normal           No discharge procedures on file      ED Provider  Electronically Signed by           Michelle Alegria, DO  11/26/18 7118

## 2018-11-26 NOTE — TELEPHONE ENCOUNTER
Pt walked into the office today requesting er f/u from today  I have updated phone number since you have tried to contact him several times

## 2018-11-28 ENCOUNTER — OFFICE VISIT (OUTPATIENT)
Dept: FAMILY MEDICINE CLINIC | Facility: CLINIC | Age: 48
End: 2018-11-28
Payer: COMMERCIAL

## 2018-11-28 ENCOUNTER — APPOINTMENT (OUTPATIENT)
Dept: LAB | Facility: HOSPITAL | Age: 48
End: 2018-11-28
Payer: COMMERCIAL

## 2018-11-28 VITALS
SYSTOLIC BLOOD PRESSURE: 108 MMHG | WEIGHT: 178 LBS | OXYGEN SATURATION: 99 % | DIASTOLIC BLOOD PRESSURE: 80 MMHG | RESPIRATION RATE: 16 BRPM | HEART RATE: 70 BPM | BODY MASS INDEX: 25.54 KG/M2 | TEMPERATURE: 97.7 F

## 2018-11-28 DIAGNOSIS — J45.20 MILD INTERMITTENT ASTHMA WITHOUT COMPLICATION: ICD-10-CM

## 2018-11-28 DIAGNOSIS — Z20.6 HIV EXPOSURE: ICD-10-CM

## 2018-11-28 DIAGNOSIS — R55 NEAR SYNCOPE: ICD-10-CM

## 2018-11-28 DIAGNOSIS — E11.9 TYPE 2 DIABETES MELLITUS WITHOUT COMPLICATION, WITHOUT LONG-TERM CURRENT USE OF INSULIN (HCC): ICD-10-CM

## 2018-11-28 DIAGNOSIS — R55 NEAR SYNCOPE: Primary | ICD-10-CM

## 2018-11-28 PROBLEM — O22.30 DVT (DEEP VEIN THROMBOSIS) IN PREGNANCY: Status: ACTIVE | Noted: 2018-11-28

## 2018-11-28 PROBLEM — I82.409 DEEP VEIN THROMBOSIS (DVT) OF LOWER EXTREMITY (HCC): Status: ACTIVE | Noted: 2018-11-28

## 2018-11-28 PROBLEM — E11.40 DIABETIC NEUROPATHY ASSOCIATED WITH TYPE 2 DIABETES MELLITUS (HCC): Status: ACTIVE | Noted: 2018-11-28

## 2018-11-28 PROBLEM — D68.59 PROTEIN S DEFICIENCY (HCC): Status: ACTIVE | Noted: 2018-11-28

## 2018-11-28 LAB
CHOLEST SERPL-MCNC: 187 MG/DL
CREAT UR-MCNC: 141 MG/DL
HDLC SERPL-MCNC: 60 MG/DL (ref 40–59)
LDLC SERPL CALC-MCNC: 99 MG/DL
MICROALBUMIN UR-MCNC: 5.2 MG/L (ref 0–20)
MICROALBUMIN/CREAT 24H UR: 4 MG/G CREATININE (ref 0–30)
NONHDLC SERPL-MCNC: 127 MG/DL
TRIGL SERPL-MCNC: 139 MG/DL
TSH SERPL DL<=0.05 MIU/L-ACNC: 1.78 UIU/ML (ref 0.47–4.68)
VIT B12 SERPL-MCNC: 406 PG/ML (ref 100–900)

## 2018-11-28 PROCEDURE — 82607 VITAMIN B-12: CPT

## 2018-11-28 PROCEDURE — 90732 PPSV23 VACC 2 YRS+ SUBQ/IM: CPT | Performed by: FAMILY MEDICINE

## 2018-11-28 PROCEDURE — 99214 OFFICE O/P EST MOD 30 MIN: CPT | Performed by: FAMILY MEDICINE

## 2018-11-28 PROCEDURE — 36415 COLL VENOUS BLD VENIPUNCTURE: CPT

## 2018-11-28 PROCEDURE — 83036 HEMOGLOBIN GLYCOSYLATED A1C: CPT

## 2018-11-28 PROCEDURE — 87389 HIV-1 AG W/HIV-1&-2 AB AG IA: CPT

## 2018-11-28 PROCEDURE — 3061F NEG MICROALBUMINURIA REV: CPT | Performed by: FAMILY MEDICINE

## 2018-11-28 PROCEDURE — 90471 IMMUNIZATION ADMIN: CPT | Performed by: FAMILY MEDICINE

## 2018-11-28 RX ORDER — LANCETS 28 GAUGE
EACH MISCELLANEOUS
Qty: 100 EACH | Refills: 5 | Status: SHIPPED | OUTPATIENT
Start: 2018-11-28 | End: 2018-11-28 | Stop reason: CLARIF

## 2018-11-28 RX ORDER — LANCETS
EACH MISCELLANEOUS DAILY
Qty: 100 EACH | Refills: 5 | Status: SHIPPED | OUTPATIENT
Start: 2018-11-28

## 2018-11-28 RX ORDER — ALBUTEROL SULFATE 90 UG/1
2 AEROSOL, METERED RESPIRATORY (INHALATION) EVERY 4 HOURS PRN
Qty: 1 INHALER | Refills: 0 | Status: SHIPPED | OUTPATIENT
Start: 2018-11-28 | End: 2019-12-12 | Stop reason: SDUPTHER

## 2018-11-28 RX ORDER — EMTRICITABINE AND TENOFOVIR DISOPROXIL FUMARATE 200; 300 MG/1; MG/1
1 TABLET, FILM COATED ORAL EVERY 24 HOURS
Qty: 30 TABLET | Refills: 1 | Status: SHIPPED | OUTPATIENT
Start: 2018-11-28 | End: 2019-03-01 | Stop reason: SDUPTHER

## 2018-11-28 RX ORDER — BLOOD-GLUCOSE METER
EACH MISCELLANEOUS DAILY
Qty: 1 KIT | Refills: 0 | Status: SHIPPED | OUTPATIENT
Start: 2018-11-28

## 2018-11-28 RX ORDER — BLOOD-GLUCOSE METER
1 KIT MISCELLANEOUS DAILY
Qty: 1 EACH | Refills: 0 | Status: SHIPPED | OUTPATIENT
Start: 2018-11-28 | End: 2018-11-28 | Stop reason: CLARIF

## 2018-11-28 NOTE — ASSESSMENT & PLAN NOTE
Patient is currently in a relationship with a partner who is HIV positive  Will refill Truvada  Will order an HIV test

## 2018-11-28 NOTE — ASSESSMENT & PLAN NOTE
Patient did mention that he has only required albuterol about twice in the past month with no nighttime awakening to use albuterol  Will refill albuterol

## 2018-11-28 NOTE — ASSESSMENT & PLAN NOTE
Lab Results   Component Value Date    HGBA1C 7 4 (H) 09/01/2016       Recent Labs      11/26/18   1038   POCGLU  86       Blood Sugar Average: Last 72 hrs:   no blood glucose home  Will decrease the metformin to 500 mg b i d  from 850 mg b i d  given the patient concerned that it is possibly hypoglycemia that is causing the events  Will order hemoglobin A1c lipid profile, urine microalbumin  Provided the patient with a blood glucose monitor  Will adjust the medications based on patient's blood glucose log as well as the hemoglobin A1c

## 2018-11-28 NOTE — ASSESSMENT & PLAN NOTE
Has completely resolved  No current signs and symptoms of hypoglycemia  Will provide the patient with a glucose monitor kit and will ask the patient provided blood glucose level at home  Blood pressure and heart rate were within normal parameter

## 2018-11-28 NOTE — ASSESSMENT & PLAN NOTE
Seems to be the most likely cause of his pins and needles in his feet  An office KAIT showed ratio more than 1 bilaterally which makes it less concerning to be a peripheral vascular disease  Will aim controlling the patient's diabetes

## 2018-11-28 NOTE — PROGRESS NOTES
Assessment/Plan:    HIV exposure  Patient is currently in a relationship with a partner who is HIV positive  Will refill Truvada  Will order an HIV test    Near syncope  Has completely resolved  No current signs and symptoms of hypoglycemia  Will provide the patient with a glucose monitor kit and will ask the patient provided blood glucose level at home  Blood pressure and heart rate were within normal parameter    Mild intermittent asthma without complication  Patient did mention that he has only required albuterol about twice in the past month with no nighttime awakening to use albuterol  Will refill albuterol    Type 2 diabetes mellitus without complication, without long-term current use of insulin ([facility] )  Lab Results   Component Value Date    HGBA1C 7 4 (H) 09/01/2016       Recent Labs      11/26/18   1038   POCGLU  86       Blood Sugar Average: Last 72 hrs:   no blood glucose home  Will decrease the metformin to 500 mg b i d  from 850 mg b i d  given the patient concerned that it is possibly hypoglycemia that is causing the events  Will order hemoglobin A1c lipid profile, urine microalbumin  Provided the patient with a blood glucose monitor  Will adjust the medications based on patient's blood glucose log as well as the hemoglobin A1c    Diabetic neuropathy associated with type 2 diabetes mellitus ([facility] )  Seems to be the most likely cause of his pins and needles in his feet  An office KAIT showed ratio more than 1 bilaterally which makes it less concerning to be a peripheral vascular disease  Will aim controlling the patient's diabetes      Return in about 4 weeks (around 12/26/2018) for Annual physical, DM follow   Diagnoses and all orders for this visit:    Near syncope  -     Vitamin B12; Future  -     TSH, 3rd generation with Free T4 reflex;  Future    Type 2 diabetes mellitus without complication, without long-term current use of insulin ([facility])  -     Discontinue: glucose monitoring kit (FREESTYLE) monitoring kit; 1 each by Does not apply route daily  -     Discontinue: Lancets (FREESTYLE) lancets; Use as instructed  -     HEMOGLOBIN A1C W/ EAG ESTIMATION; Future  -     Microalbumin / creatinine urine ratio; Future  -     Lipid panel; Future  -     metFORMIN (GLUCOPHAGE) 500 mg tablet; Take 1 tablet (500 mg total) by mouth 2 (two) times a day with meals  -     Cancel: VAS KAIT & waveform analysis, multiple levels; Future  -     PNEUMOCOCCAL POLYSACCHARIDE VACCINE 23-VALENT =>1YO SQ IM  -     glucose blood (ACCU-CHEK GUIDE) test strip; 1 each by Other route 4 (four) times a day Use as instructed  -     Blood Glucose Monitoring Suppl (ACCU-CHEK GUIDE) w/Device KIT; by Does not apply route daily  -     ACCU-CHEK FASTCLIX LANCETS MISC; by Does not apply route daily    HIV exposure  -     HIV 1/2 AG-AB combo; Future  -     emtricitabine-tenofovir (TRUVADA) 200-300 mg per tablet; Take 1 tablet by mouth every 24 hours    Mild intermittent asthma without complication  -     albuterol (PROAIR HFA) 90 mcg/act inhaler; Inhale 2 puffs every 4 (four) hours as needed for wheezing or shortness of breath        Subjective:     Kerri Prader is a 46 y o  male who  has a past medical history of Anemia; Anxiety; Arthritis; Asthma; Depression; Diabetes mellitus ([facility] ); Enlarged prostate; Epilepsy ([facility] ); Hypertension; Psychiatric disorder; Psychiatric illness; Psychosis ([facility] ); and Sleep apnea  who presented to the office today for bilateral foot pain and an emergency department follow-up for near-syncope  At the emergency department, patient with afebrile, vital signs were stable, CBC/CMP/UA were within normal limits, urine drug screen was positive for amphetamines  EKG showed normal sinus rhythm  HPI  HPI   Patient's only concern today about his foot pain    He describes it as pins and needles, started about 5 days prior to presentation, no weakness, no falls, no trauma, no injuries or ulcers, did use experience such symptoms prior but not with the same intensity or severity and were not consistent  Patient did mention that he does not know his blood glucose levels at home and his blood glucose machine is not functioning at the moment  He is concerned about his recent fluctuation of blood glucose at the emergency department in which was noted to be 141 point followed by 86 after normal saline bolus  Review of Systems   Constitutional: Negative for activity change, appetite change, fatigue, fever and unexpected weight change  HENT: Negative for sore throat  Respiratory: Negative for cough, chest tightness, shortness of breath and wheezing  Cardiovascular: Negative for chest pain, palpitations and leg swelling  Gastrointestinal: Negative for abdominal pain, blood in stool, constipation, diarrhea, nausea and vomiting  Endocrine: Negative for polydipsia and polyuria  Genitourinary: Negative for dysuria and hematuria  Musculoskeletal: Negative for arthralgias, gait problem and joint swelling  Bilateral foot pins and needles   Skin: Negative for rash  Neurological: Negative for dizziness, syncope, light-headedness, numbness and headaches  Objective:    /80 (BP Location: Left arm, Patient Position: Sitting, Cuff Size: Adult)   Pulse 70   Temp 97 7 °F (36 5 °C) (Temporal)   Resp 16   Wt 80 7 kg (178 lb)   SpO2 99%   BMI 25 54 kg/m²       Physical Exam   Constitutional: He is oriented to person, place, and time  He appears well-developed and well-nourished  No distress  HENT:   Head: Normocephalic and atraumatic  Nose: Nose normal    Eyes: Pupils are equal, round, and reactive to light  Conjunctivae are normal    Neck: Normal range of motion  Neck supple  Cardiovascular: Normal rate, regular rhythm and normal heart sounds  Exam reveals no gallop and no friction rub  Pulses are no weak pulses  No murmur heard    Pulses:       Dorsalis pedis pulses are 0 on the right side, and 0 on the left side  Posterior tibial pulses are 2+ on the right side, and 2+ on the left side  Pulmonary/Chest: Effort normal and breath sounds normal  No respiratory distress  He has no wheezes  He has no rales  Abdominal: Soft  Bowel sounds are normal  He exhibits no distension  There is no tenderness  There is no rebound  Musculoskeletal: Normal range of motion  He exhibits no edema  Feet:   Right Foot:   Skin Integrity: Negative for ulcer, skin breakdown, erythema, warmth, callus or dry skin  Left Foot:   Skin Integrity: Negative for ulcer, skin breakdown, erythema, warmth, callus or dry skin  Neurological: He is alert and oriented to person, place, and time  Skin: Skin is warm and dry  No rash noted  He is not diaphoretic  No erythema  Patient's shoes and socks removed  Right Foot/Ankle   Right Foot Inspection  Skin Exam: skin normal and skin intact no dry skin, no warmth, no callus, no erythema, no maceration, no abnormal color, no pre-ulcer, no ulcer and no callus                          Toe Exam: ROM and strength within normal limitsno swelling, no tenderness, erythema and  no right toe deformity  Sensory       Monofilament testing: diminished  Vascular  Capillary refills: < 3 seconds  The right DP pulse is 0  The right PT pulse is 2+  Right Toe  - Comprehensive Exam  Ecchymosis: none  Arch: normal  Hammertoes: absent  Claw Toes: absent  Tenderness: none         Left Foot/Ankle  Left Foot Inspection  Skin Exam: skin normal and skin intactno dry skin, no warmth, no erythema, no maceration, normal color, no pre-ulcer, no ulcer and no callus                         Toe Exam: ROM and strength within normal limitsno swelling, no tenderness, no erythema and no left toe deformity                   Sensory       Monofilament: diminished  Vascular  Capillary refills: < 3 seconds  The left DP pulse is 0  The left PT pulse is 2+     Left Toe  - Comprehensive Exam  Ecchymosis: none  Arch: normal  Hammertoes: absent  Claw toes: absent  Swelling: none   Tenderness: none       Assign Risk Category:  No deformity present; No loss of protective sensation;  No weak pulses       Risk: 0      Rocio Stanford MD  11/28/18  4:53 PM

## 2018-11-29 LAB
EST. AVERAGE GLUCOSE BLD GHB EST-MCNC: 160 MG/DL
HBA1C MFR BLD: 7.2 % (ref 4.2–6.3)

## 2018-11-30 LAB — HIV 1+2 AB+HIV1 P24 AG SERPL QL IA: NORMAL

## 2018-12-06 DIAGNOSIS — D68.59 PROTEIN S DEFICIENCY (HCC): ICD-10-CM

## 2018-12-06 DIAGNOSIS — I82.4Y1 DVT, LOWER EXTREMITY, PROXIMAL, ACUTE, RIGHT (HCC): ICD-10-CM

## 2019-02-04 DIAGNOSIS — D68.59 PROTEIN S DEFICIENCY (HCC): ICD-10-CM

## 2019-02-04 DIAGNOSIS — I82.4Y1 DVT, LOWER EXTREMITY, PROXIMAL, ACUTE, RIGHT (HCC): ICD-10-CM

## 2019-03-01 ENCOUNTER — OFFICE VISIT (OUTPATIENT)
Dept: FAMILY MEDICINE CLINIC | Facility: CLINIC | Age: 49
End: 2019-03-01

## 2019-03-01 VITALS
BODY MASS INDEX: 27.77 KG/M2 | HEART RATE: 87 BPM | TEMPERATURE: 98 F | HEIGHT: 70 IN | SYSTOLIC BLOOD PRESSURE: 128 MMHG | OXYGEN SATURATION: 98 % | DIASTOLIC BLOOD PRESSURE: 74 MMHG | RESPIRATION RATE: 18 BRPM | WEIGHT: 194 LBS

## 2019-03-01 DIAGNOSIS — Z12.11 COLON CANCER SCREENING: ICD-10-CM

## 2019-03-01 DIAGNOSIS — D68.59 PROTEIN S DEFICIENCY (HCC): ICD-10-CM

## 2019-03-01 DIAGNOSIS — Z20.6 HIV EXPOSURE: ICD-10-CM

## 2019-03-01 DIAGNOSIS — R35.0 BENIGN PROSTATIC HYPERPLASIA WITH URINARY FREQUENCY: ICD-10-CM

## 2019-03-01 DIAGNOSIS — N40.1 BENIGN PROSTATIC HYPERPLASIA WITH URINARY FREQUENCY: ICD-10-CM

## 2019-03-01 DIAGNOSIS — N50.89 TESTICLE LUMP: ICD-10-CM

## 2019-03-01 DIAGNOSIS — N52.8 OTHER MALE ERECTILE DYSFUNCTION: ICD-10-CM

## 2019-03-01 DIAGNOSIS — Z71.6 TOBACCO ABUSE COUNSELING: ICD-10-CM

## 2019-03-01 DIAGNOSIS — E11.9 TYPE 2 DIABETES MELLITUS WITHOUT COMPLICATION, WITHOUT LONG-TERM CURRENT USE OF INSULIN (HCC): ICD-10-CM

## 2019-03-01 DIAGNOSIS — Z00.00 ENCOUNTER FOR WELLNESS EXAMINATION: Primary | ICD-10-CM

## 2019-03-01 DIAGNOSIS — F17.200 TOBACCO DEPENDENCE: ICD-10-CM

## 2019-03-01 PROCEDURE — 99406 BEHAV CHNG SMOKING 3-10 MIN: CPT | Performed by: FAMILY MEDICINE

## 2019-03-01 PROCEDURE — 3725F SCREEN DEPRESSION PERFORMED: CPT | Performed by: FAMILY MEDICINE

## 2019-03-01 PROCEDURE — 99396 PREV VISIT EST AGE 40-64: CPT | Performed by: FAMILY MEDICINE

## 2019-03-01 RX ORDER — EMTRICITABINE AND TENOFOVIR DISOPROXIL FUMARATE 200; 300 MG/1; MG/1
1 TABLET, FILM COATED ORAL EVERY 24 HOURS
Qty: 30 TABLET | Refills: 5 | Status: SHIPPED | OUTPATIENT
Start: 2019-03-01 | End: 2019-12-12 | Stop reason: SDUPTHER

## 2019-03-01 NOTE — PROGRESS NOTES
Assessment/Plan:    Type 2 diabetes mellitus without complication, without long-term current use of insulin (HCC)  Lab Results   Component Value Date    HGBA1C 7 2 (H) 11/28/2018       No results for input(s): POCGLU in the last 72 hours  Blood Sugar Average: Last 72 hrs:   will continue with metformin 500 mg b i d  Will order hemoglobin A1c    Protein S deficiency (HCC)  Continue with Xarelto 20 mg daily    Benign prostatic hyperplasia with urinary frequency  As noted on physical exam  Will order PSA total and free  If the labs within normal limit, will start the patient on a trial of Flomax 0 4 mg daily    Tobacco dependence  Asked about smoking Yes  Advised about tobacco cessation Yes  Assessed if they are willing to quit Yes  Assisted the pt to quit smoking Yes  Arranged follow up care Yes    Patient has decreased his smoking down to about 1 pack of cigarettes per week  Will start the patient on a trial of nicotine patches    Tobacco abuse counseling  As noted above    Testicle lump  Seems to be off of the same consistency as the testicle itself  Will order a testicular ultrasound as well as a UA  Will refer the patient to Urology for further evaluation management    HIV exposure  Patient's partner is HIV positive but the patient is HIV negative  Will continue with Truvada    Encounter for wellness examination  Up-to-date with immunization except for a flu vaccine which the patient declined  Will refer the patient to GI for colonoscopy    Other male erectile dysfunction  Uncertain of the etiology but could be idiopathic in nature  Will hold off on starting on any medications at the moment until further evaluation of the testicular lump      Return in about 3 months (around 6/1/2019) for For DM   Diagnoses and all orders for this visit:    Encounter for wellness examination    Colon cancer screening  -     Ambulatory referral to Gastroenterology;  Future    Benign prostatic hyperplasia with urinary frequency  -     PSA, total and free; Future  -     UA w Reflex to Microscopic w Reflex to Culture -Lab Collect; Future    Tobacco dependence  -     nicotine (NICODERM CQ) 7 mg/24hr TD 24 hr patch; Place 1 patch on the skin every 24 hours    Tobacco abuse counseling  -     nicotine (NICODERM CQ) 7 mg/24hr TD 24 hr patch; Place 1 patch on the skin every 24 hours  -     HEMOGLOBIN A1C W/ EAG ESTIMATION; Future    Type 2 diabetes mellitus without complication, without long-term current use of insulin (HCC)  -     metFORMIN (GLUCOPHAGE) 500 mg tablet; Take 1 tablet (500 mg total) by mouth 2 (two) times a day with meals    HIV exposure  -     emtricitabine-tenofovir (TRUVADA) 200-300 mg per tablet; Take 1 tablet by mouth every 24 hours    Protein S deficiency (HCC)  -     rivaroxaban (XARELTO) 20 mg tablet; Take 1 tablet (20 mg total) by mouth every 24 hours    Testicle lump  -     Ambulatory referral to Urology; Future  -     US scrotum and testicles; Future    Other male erectile dysfunction        Subjective:     Minerva Roy is a 46 y o  male who  has a past medical history of Anemia, Anxiety, Arthritis, Asthma, Depression, Diabetes mellitus ([address] ), Enlarged prostate, Epilepsy ([address] ), Hypertension, Psychiatric disorder, Psychiatric illness, Psychosis ([address] ), and Sleep apnea  who presented to the office today for annual visit  HPI  No significant FH of colon cancer or recent changes in BM      Testicler issue: noticed a "third testicle" about 1 month ago, no hematuria or dysuria, weak stream, no discharge, draining of urine after finishing urinating, wakes up in the middle of the night to urinate 3-4 times, urgency, no new sexual partners      ED: started 1 5 months, difficulity mainting and starting erections, new job, new apartment, in the process of moving for about 3 weeks, worsened over time      Current Outpatient Medications on File Prior to Visit   Medication Sig Dispense Refill   [address] LANCETS MISC by Does not apply route daily 100 each 5    albuterol (PROAIR HFA) 90 mcg/act inhaler Inhale 2 puffs every 4 (four) hours as needed for wheezing or shortness of breath 1 Inhaler 0    Blood Glucose Monitoring Suppl (ACCU-CHEK GUIDE) w/Device KIT by Does not apply route daily 1 kit 0    DIVALPROEX SODIUM PO Take 25 mg by mouth 3 (three) times a day      glucose blood (ACCU-CHEK GUIDE) test strip 1 each by Other route 4 (four) times a day Use as instructed 100 each 5    hydrOXYzine pamoate (VISTARIL) 50 mg capsule Take 50 mg by mouth daily      mirtazapine (REMERON) 15 mg tablet Take 15 mg by mouth daily at bedtime      risperiDONE (RisperDAL) 4 mg tablet Take 4 mg by mouth daily        [DISCONTINUED] emtricitabine-tenofovir (TRUVADA) 200-300 mg per tablet Take 1 tablet by mouth every 24 hours 30 tablet 1    [DISCONTINUED] metFORMIN (GLUCOPHAGE) 500 mg tablet Take 1 tablet (500 mg total) by mouth 2 (two) times a day with meals 60 tablet 1    [DISCONTINUED] rivaroxaban (XARELTO) 20 mg tablet Take 1 tablet (20 mg total) by mouth every 24 hours 30 tablet 0     No current facility-administered medications on file prior to visit  Past Medical History:   Diagnosis Date    Anemia     Anxiety     Arthritis     Asthma     Depression     Diabetes mellitus ([facility] )     Enlarged prostate     Epilepsy ([facility] )     Hypertension     Psychiatric disorder     bipolar    Psychiatric illness     Psychosis ([facility] )     Sleep apnea      Past Surgical History:   Procedure Laterality Date    CYST REMOVAL      ESOPHAGOGASTRODUODENOSCOPY N/A 11/9/2016    Procedure: ESOPHAGOGASTRODUODENOSCOPY (EGD); Surgeon: Donnie Sage MD;  Location: [facility]; Service:     ESOPHAGOGASTRODUODENOSCOPY N/A 9/2/2016    Procedure: ESOPHAGOGASTRODUODENOSCOPY (EGD); Surgeon: Donnie Sage MD;  Location: [facility];   Service:     HERNIA REPAIR       Social History     Socioeconomic History    Marital status:  Spouse name: None    Number of children: None    Years of education: None    Highest education level: None   Occupational History    None   Social Needs    Financial resource strain: None    Food insecurity:     Worry: None     Inability: None    Transportation needs:     Medical: None     Non-medical: None   Tobacco Use    Smoking status: Current Every Day Smoker     Packs/day: 0 20     Years: 15 00     Pack years: 3 00    Smokeless tobacco: Current User    Tobacco comment: smokes occasionally; LIGHT TOBACCO SMOKER, TOBACCO USE, CIGARETTE, 2 CIGARETTES/DAY AS PER NEXTGEN   Substance and Sexual Activity    Alcohol use: Yes     Comment: 5 or more cans beers daily    Drug use: Yes     Types: Marijuana, Cocaine     Comment: 2 blunts- last used 8/29/16    Sexual activity: Yes     Partners: Female   Lifestyle    Physical activity:     Days per week: None     Minutes per session: None    Stress: None   Relationships    Social connections:     Talks on phone: None     Gets together: None     Attends Cheondo[employer] service: None     Active member of club or organization: None     Attends meetings of clubs or organizations: None     Relationship status: None    Intimate partner violence:     Fear of current or ex partner: None     Emotionally abused: None     Physically abused: None     Forced sexual activity: None   Other Topics Concern    None   Social History Narrative    None     Family History   Problem Relation Age of Onset    No Known Problems Mother     Colon cancer Father 61    No Known Problems Sister     No Known Problems Brother     No Known Problems Maternal Aunt     No Known Problems Paternal Aunt     No Known Problems Maternal Uncle     No Known Problems Paternal Uncle     No Known Problems Maternal Grandfather     No Known Problems Maternal Grandmother     No Known Problems Paternal Grandfather     Hypertension Paternal Grandmother     No Known Problems Cousin     Prostate cancer Family     Stroke Family     Diabetes Family         MELLITUS    Coronary artery disease Family        Review of Systems   Constitutional: Negative for activity change, appetite change, chills, fatigue and fever  Respiratory: Negative for cough, chest tightness and shortness of breath  Cardiovascular: Negative for chest pain, palpitations and leg swelling  Gastrointestinal: Negative for abdominal pain, blood in stool, constipation, diarrhea, nausea and vomiting  Endocrine: Negative for polydipsia and polyuria  Genitourinary: Positive for decreased urine volume, frequency and urgency  Negative for difficulty urinating, dysuria, genital sores, hematuria, penile pain, penile swelling, scrotal swelling and testicular pain  Testicular lump   Musculoskeletal: Negative for back pain  Skin: Negative for rash  Neurological: Negative for syncope, weakness and numbness  Hematological: Negative for adenopathy  Objective:    /74 (BP Location: Left arm, Patient Position: Sitting, Cuff Size: Large)   Pulse 87   Temp 98 °F (36 7 °C) (Temporal)   Resp 18   Ht 5' 10" (1 778 m)   Wt 88 kg (194 lb)   SpO2 98%   BMI 27 84 kg/m²       Physical Exam   Constitutional: He is oriented to person, place, and time  He appears well-developed and well-nourished  No distress  HENT:   Head: Normocephalic and atraumatic  Nose: Nose normal    Eyes: Pupils are equal, round, and reactive to light  Conjunctivae are normal  No scleral icterus  Neck: Normal range of motion  Neck supple  Cardiovascular: Normal rate, regular rhythm and normal heart sounds  Exam reveals no gallop and no friction rub  No murmur heard  Pulmonary/Chest: Effort normal and breath sounds normal  No respiratory distress  He has no wheezes  He has no rales  Abdominal: Soft  Bowel sounds are normal  He exhibits no distension  There is no tenderness  Genitourinary: Rectum normal and penis normal  No penile tenderness  Genitourinary Comments: Mildly enlarged prostate, smooth, no nodules   A medium-size left testicular lump measuring about 2 cm that is of the same consistency as the left testicle, no noted swelling   Musculoskeletal: Normal range of motion  He exhibits no edema  Neurological: He is alert and oriented to person, place, and time  Skin: Skin is warm and dry  No rash noted  He is not diaphoretic  No erythema         Vin Myrick MD  03/01/19  5:16 PM

## 2019-03-01 NOTE — ASSESSMENT & PLAN NOTE
Lab Results   Component Value Date    HGBA1C 7 2 (H) 11/28/2018       No results for input(s): POCGLU in the last 72 hours  Blood Sugar Average: Last 72 hrs:   will continue with metformin 500 mg b i d    Will order hemoglobin A1c

## 2019-03-01 NOTE — ASSESSMENT & PLAN NOTE
Up-to-date with immunization except for a flu vaccine which the patient declined  Will refer the patient to GI for colonoscopy

## 2019-03-01 NOTE — ASSESSMENT & PLAN NOTE
Uncertain of the etiology but could be idiopathic in nature  Will hold off on starting on any medications at the moment until further evaluation of the testicular lump

## 2019-03-01 NOTE — ASSESSMENT & PLAN NOTE
Asked about smoking Yes  Advised about tobacco cessation Yes  Assessed if they are willing to quit Yes  Assisted the pt to quit smoking Yes  Arranged follow up care Yes    Patient has decreased his smoking down to about 1 pack of cigarettes per week  Will start the patient on a trial of nicotine patches

## 2019-03-01 NOTE — ASSESSMENT & PLAN NOTE
Seems to be off of the same consistency as the testicle itself  Will order a testicular ultrasound as well as a UA  Will refer the patient to Urology for further evaluation management

## 2019-03-01 NOTE — PATIENT INSTRUCTIONS
Basic diet recommendation     - Drinks  o Okay to drink [employer], flavored seltzer water, beverages like coffee, tea, green tea, herbal tea without any sugar  o Cut down on  Regular soda, diet soda, diet drinks and 100% fruit juice    - Sweetener  o Can use stevia, truvia, monk fruit  o Avoid sugar, other artificial sweeteners, honey or agave     - Fruits  o Apples, pears, berries  consume in moderation  Avoid juices, instead consume whole fruit    - Eat 3 balanced meals  Limit snacks to 1-2 times daily     - Foods to cut down on  o Bread, pasta, corn, cereal  o Starchy vegetables like potatoes, yams, taro  o Peas, lentils, beans ( except green beans and snow peas)    - Safe snacks  o 12 unsalted nuts and 6 rice crackers OR  o 1 apple with 1-2 spoons of peanut butter OR  o 100 calorie nuts and 1 small box of raisins ( kids size) OR  o 6oz plain or vanilla [place] yogurt and ½ cup berries OR  o ½ tuna sandwich OR  o 12 edamame pods OR  o 1 pear and 1-2 slices low fat cheese  o Raw vegetable sticks ( 2 carrot sticks, 2 celery sticks, raw broccoli, pepper, cucumber, or tomato)    - Exercise     CURRENT [employer] TIPS ON EXERCISE:  IF YOU HAVE CONDITIONS THAT PREVENT AEROBIC EXERCISE:  WALK 30 MINUTES AT LEAST 5 DAYS PER WEEK; MAY BREAK IT UP INTO 10-  15 MINUTE SESSIONS  GET SOME RESISTANCE EXERCISES USING THE MAJOR MUSCLE GROUPS 2-3 TIMES PER WEEK   IF YOU ARE PHYSICALLY ABLE:  TRY TO GET 10,000 STEPS 3 TIMES PER WEEK  PLUS  GO "ONLINE" TO CHECK TARGET HEART RATE FOR YOUR AGE AND DO AEROBIC EXERCISES (JOG/STATIONARY BIKE/ELLIPTICAL) FOR 20-30 MINUTES 2-3 TIMES PER WEEK

## 2019-03-01 NOTE — ASSESSMENT & PLAN NOTE
As noted on physical exam  Will order PSA total and free  If the labs within normal limit, will start the patient on a trial of Flomax 0 4 mg daily

## 2019-03-05 ENCOUNTER — APPOINTMENT (OUTPATIENT)
Dept: LAB | Facility: HOSPITAL | Age: 49
End: 2019-03-05
Payer: COMMERCIAL

## 2019-03-05 DIAGNOSIS — N40.1 BENIGN PROSTATIC HYPERPLASIA WITH URINARY FREQUENCY: ICD-10-CM

## 2019-03-05 DIAGNOSIS — Z71.6 TOBACCO ABUSE COUNSELING: ICD-10-CM

## 2019-03-05 DIAGNOSIS — R35.0 BENIGN PROSTATIC HYPERPLASIA WITH URINARY FREQUENCY: ICD-10-CM

## 2019-03-05 LAB
BILIRUB UR QL STRIP: NEGATIVE
CLARITY UR: CLEAR
COLOR UR: YELLOW
EST. AVERAGE GLUCOSE BLD GHB EST-MCNC: 143 MG/DL
GLUCOSE UR STRIP-MCNC: NEGATIVE MG/DL
HBA1C MFR BLD: 6.6 % (ref 4.2–6.3)
HGB UR QL STRIP.AUTO: NEGATIVE
KETONES UR STRIP-MCNC: NEGATIVE MG/DL
LEUKOCYTE ESTERASE UR QL STRIP: NEGATIVE
NITRITE UR QL STRIP: NEGATIVE
PH UR STRIP.AUTO: 6 [PH]
PROT UR STRIP-MCNC: NEGATIVE MG/DL
SP GR UR STRIP.AUTO: 1.02 (ref 1–1.04)
UROBILINOGEN UA: NEGATIVE MG/DL

## 2019-03-05 PROCEDURE — 84153 ASSAY OF PSA TOTAL: CPT

## 2019-03-05 PROCEDURE — 36415 COLL VENOUS BLD VENIPUNCTURE: CPT

## 2019-03-05 PROCEDURE — 81003 URINALYSIS AUTO W/O SCOPE: CPT

## 2019-03-05 PROCEDURE — 84154 ASSAY OF PSA FREE: CPT

## 2019-03-05 PROCEDURE — 83036 HEMOGLOBIN GLYCOSYLATED A1C: CPT

## 2019-03-06 LAB
PSA FREE MFR SERPL: 34.3 %
PSA FREE SERPL-MCNC: 0.48 NG/ML
PSA SERPL-MCNC: 1.4 NG/ML (ref 0–4)

## 2019-03-07 ENCOUNTER — HOSPITAL ENCOUNTER (OUTPATIENT)
Dept: ULTRASOUND IMAGING | Facility: HOSPITAL | Age: 49
Discharge: HOME | End: 2019-03-07
Payer: COMMERCIAL

## 2019-03-07 DIAGNOSIS — N50.89 TESTICLE LUMP: ICD-10-CM

## 2019-03-07 PROCEDURE — 76870 US EXAM SCROTUM: CPT

## 2019-03-13 ENCOUNTER — TELEPHONE (OUTPATIENT)
Dept: FAMILY MEDICINE CLINIC | Facility: CLINIC | Age: 49
End: 2019-03-13

## 2019-03-13 ENCOUNTER — TELEPHONE (OUTPATIENT)
Dept: UROLOGY | Facility: MEDICAL CENTER | Age: 49
End: 2019-03-13

## 2019-03-13 DIAGNOSIS — R35.0 BENIGN PROSTATIC HYPERPLASIA WITH URINARY FREQUENCY: Primary | ICD-10-CM

## 2019-03-13 DIAGNOSIS — N40.1 BENIGN PROSTATIC HYPERPLASIA WITH URINARY FREQUENCY: Primary | ICD-10-CM

## 2019-03-13 DIAGNOSIS — N52.8 OTHER MALE ERECTILE DYSFUNCTION: ICD-10-CM

## 2019-03-13 RX ORDER — SILDENAFIL 50 MG/1
50 TABLET, FILM COATED ORAL DAILY PRN
Qty: 10 TABLET | Refills: 1 | Status: ON HOLD | OUTPATIENT
Start: 2019-03-13 | End: 2019-06-13 | Stop reason: ALTCHOICE

## 2019-03-13 RX ORDER — TAMSULOSIN HYDROCHLORIDE 0.4 MG/1
0.4 CAPSULE ORAL
Qty: 30 CAPSULE | Refills: 1 | Status: SHIPPED | OUTPATIENT
Start: 2019-03-13 | End: 2019-12-12 | Stop reason: SDUPTHER

## 2019-03-13 NOTE — TELEPHONE ENCOUNTER
Complaint/Diagnosis:Hydrocele    Insurance:[employer]    History of Cancer:no    Previous urologist:no    Outside testing/where:epic    If yes,what kind:epic    Records requested/where:[facility]    Preferred location:[place]

## 2019-03-13 NOTE — TELEPHONE ENCOUNTER
I made the appointment for patient for urology on 03/14/19 @ 245pm I called patient and give him information

## 2019-03-13 NOTE — TELEPHONE ENCOUNTER
Called to speak about the lab results as well as the ultrasounds:   Labs: all WNL with except of A1C that is showing significant improvement  Ultrasound is concerning for a complex cyst, patient has a referral for Uro, will ask Destiny to schedule this young gentleman for Uro follow up for further eval and treatment  In the meantime will start patient on Flomax for for the symptomatic BPH  Will also provide the patient with the script for Viagra for his symptomatic ED

## 2019-03-14 ENCOUNTER — OFFICE VISIT (OUTPATIENT)
Dept: UROLOGY | Facility: MEDICAL CENTER | Age: 49
End: 2019-03-14
Payer: COMMERCIAL

## 2019-03-14 VITALS
SYSTOLIC BLOOD PRESSURE: 118 MMHG | HEIGHT: 70 IN | BODY MASS INDEX: 25.91 KG/M2 | DIASTOLIC BLOOD PRESSURE: 78 MMHG | WEIGHT: 181 LBS | HEART RATE: 86 BPM

## 2019-03-14 DIAGNOSIS — R35.0 BENIGN PROSTATIC HYPERPLASIA WITH URINARY FREQUENCY: ICD-10-CM

## 2019-03-14 DIAGNOSIS — N40.1 BENIGN PROSTATIC HYPERPLASIA WITH URINARY FREQUENCY: ICD-10-CM

## 2019-03-14 DIAGNOSIS — N43.40 SPERMATOCELE OF EPIDIDYMIS: Primary | ICD-10-CM

## 2019-03-14 LAB
SL AMB  POCT GLUCOSE, UA: ABNORMAL
SL AMB LEUKOCYTE ESTERASE,UA: ABNORMAL
SL AMB POCT BILIRUBIN,UA: ABNORMAL
SL AMB POCT BLOOD,UA: ABNORMAL
SL AMB POCT CLARITY,UA: CLEAR
SL AMB POCT COLOR,UA: YELLOW
SL AMB POCT KETONES,UA: ABNORMAL
SL AMB POCT NITRITE,UA: ABNORMAL
SL AMB POCT PH,UA: 5.5
SL AMB POCT SPECIFIC GRAVITY,UA: >=1.03
SL AMB POCT URINE PROTEIN: ABNORMAL
SL AMB POCT UROBILINOGEN: 0.2

## 2019-03-14 PROCEDURE — 81003 URINALYSIS AUTO W/O SCOPE: CPT | Performed by: UROLOGY

## 2019-03-14 PROCEDURE — 99244 OFF/OP CNSLTJ NEW/EST MOD 40: CPT | Performed by: UROLOGY

## 2019-03-14 NOTE — ASSESSMENT & PLAN NOTE
Left spermatocele is noted on scrotal ultrasound  The patient was reassured regarding these findings  We will continue to follow with serial exams  He is presently asymptomatic

## 2019-03-14 NOTE — PATIENT INSTRUCTIONS
Spermatocele   WHAT YOU NEED TO KNOW:   What is a spermatocele? A spermatocele is a fluid collection or cyst that forms in the epididymis behind your testicles  The cyst contains a milky liquid that usually has sperm in it  The cyst will feel like a smooth mass near your testicles that you can move inside your scrotum  The mass is not cancer  A spermatocele is usually not painful, but you may feel heaviness in your scrotum  The area may also be swollen  What increases my risk for a spermatocele? The cause of your spermatocele may not be known  Trauma, blockage, infection, or inflammation in your scrotum may increase your risk  How is a spermatocele diagnosed and treated? · To diagnose a spermatocele,  your healthcare provider will feel your testicles and other parts of your scrotum  He will check for lumps and pain  He may use an ultrasound if you have a lump to see if it is a spermatocele cyst  Spermatoceles are sometimes found during a routine physical exam by a healthcare provider  · Treatment for a spermatocele  may not be needed  The cyst usually goes away on its own  Your healthcare provider may recommend medicine to decrease pain or swelling if you develop discomfort  He may be able to remove the cyst if it becomes large  Talk to your healthcare provider about the risks of having a spermatocele removed  The procedure may cause infertility (problems fathering children)  What is a testicular self-exam?  A testicular self-exam is a monthly check of your testicles and scrotum  Your healthcare provider may ask you to check for changes, lumps, or pain  Ask for more information on how to do a testicular self-exam    When should I seek immediate care? · You have sudden, severe pain in your scrotum or testicle  When should I contact my healthcare provider? · The cyst does not go away or gets bigger  · The cyst becomes painful or causes discomfort       · You have questions or concerns about your condition or care  CARE AGREEMENT:   You have the right to help plan your care  Learn about your health condition and how it may be treated  Discuss treatment options with your caregivers to decide what care you want to receive  You always have the right to refuse treatment  The above information is an  only  It is not intended as medical advice for individual conditions or treatments  Talk to your doctor, nurse or pharmacist before following any medical regimen to see if it is safe and effective for you  © 2017 [employer]  Information is for End User's use only and may not be sold, redistributed or otherwise used for commercial purposes  All illustrations and images included in CareNotes® are the copyrighted property of [employer]  or [employer]

## 2019-03-14 NOTE — LETTER
March 14, 2019     Mildred Tafoya MD  [address] [place] [address]    Patient: Aroldo Orellana   YOB: 1970   Date of Visit: 3/14/2019       Dear Dr Riky Womack: Thank you for referring Aroldo Orellana to me for evaluation  Below are my notes for this consultation  If you have questions, please do not hesitate to call me  I look forward to following your patient along with you  Sincerely,        Akshta Beyer MD        CC: No Recipients  Akshat Beyer MD  3/14/2019  3:30 PM  Sign at close encounter  Assessment/Plan:    Benign prostatic hyperplasia with urinary frequency  AUA symptom score is 29  PSA is normal at 1 4  Urinalysis reveals small blood on dipstick  A CT scan revealed bilateral renal cysts in September 2018  He was just placed on tamsulosin  I recommended that he return in 6 weeks for further evaluation including cystoscopy to assess whether he would benefit more from minimally invasive therapy  [employer]rolift information was given  Spermatocele of epididymis  Left spermatocele is noted on scrotal ultrasound  The patient was reassured regarding these findings  We will continue to follow with serial exams  He is presently asymptomatic  Diagnoses and all orders for this visit:    Spermatocele of epididymis  -     POCT urine dip auto non-scope    Benign prostatic hyperplasia with urinary frequency          Subjective:      Patient ID: Aroldo Orellana is a 46 y o  male  Chief complaint:  Left scrotal mass    HPI:  53-year-old male who noted a left scrotal mass on self-examination 1 month ago  Lesion was not painful  Has not been changing over the last month  He has no dysuria or gross hematuria  He is seen in the office today in consultation regarding go scrotal lesion  Benign Prostatic Hypertrophy   This is a chronic problem  The current episode started more than 1 year ago  The problem has been gradually worsening since onset   Irritative symptoms include nocturia and urgency  Obstructive symptoms include dribbling, incomplete emptying, an intermittent stream and a weak stream  Obstructive symptoms do not include a slower stream or straining  Associated symptoms include hesitancy  Pertinent negatives include no chills, dysuria, genital pain, hematuria, nausea or vomiting  Past treatments include tamsulosin  The treatment provided no relief  He has been using treatment for 1 to 7 days  The following portions of the patient's history were reviewed and updated as appropriate: allergies, current medications, past family history, past medical history, past social history, past surgical history and problem list     Review of Systems   Constitutional: Negative  Negative for chills, diaphoresis, fatigue and fever  HENT: Negative  Eyes: Negative  Respiratory: Positive for shortness of breath  Cardiovascular: Negative  Gastrointestinal: Negative  Negative for nausea and vomiting  Endocrine: Negative  Genitourinary: Positive for hesitancy, incomplete emptying, nocturia and urgency  Negative for dysuria and hematuria  See HPI   Musculoskeletal: Negative  Skin: Negative  Allergic/Immunologic: Negative  Neurological: Negative  Hematological: Negative  Psychiatric/Behavioral: Negative  Objective:      /78   Pulse 86   Ht 5' 10" (1 778 m)   Wt 82 1 kg (181 lb)   BMI 25 97 kg/m²           Physical Exam   Constitutional: He is oriented to person, place, and time  He appears well-developed and well-nourished  HENT:   Head: Normocephalic and atraumatic  Eyes: Conjunctivae are normal    Neck: Neck supple  Cardiovascular: Normal rate  Pulmonary/Chest: Effort normal    Abdominal: Soft  Bowel sounds are normal  He exhibits no distension and no mass  There is no tenderness  There is no rebound, no guarding and no CVA tenderness     Genitourinary: Rectum normal, testes normal and penis normal  Right testis shows no mass  Left testis shows no mass  No phimosis or hypospadias  Genitourinary Comments: Both testes are descended and small  There is a 1 cm left spermatocele  Prostate 1+ enlarged and palpably benign  Musculoskeletal: He exhibits no edema  Neurological: He is alert and oriented to person, place, and time  Skin: Skin is warm and dry  Psychiatric: He has a normal mood and affect  His behavior is normal  Judgment and thought content normal    Vitals reviewed

## 2019-03-14 NOTE — ASSESSMENT & PLAN NOTE
AUA symptom score is 29  PSA is normal at 1 4  Urinalysis reveals small blood on dipstick  A CT scan revealed bilateral renal cysts in September 2018  He was just placed on tamsulosin  I recommended that he return in 6 weeks for further evaluation including cystoscopy to assess whether he would benefit more from minimally invasive therapy  Urolift information was given

## 2019-03-14 NOTE — PROGRESS NOTES
Assessment/Plan:    Benign prostatic hyperplasia with urinary frequency  AUA symptom score is 29  PSA is normal at 1 4  Urinalysis reveals small blood on dipstick  A CT scan revealed bilateral renal cysts in September 2018  He was just placed on tamsulosin  I recommended that he return in 6 weeks for further evaluation including cystoscopy to assess whether he would benefit more from minimally invasive therapy  Urolift information was given  Spermatocele of epididymis  Left spermatocele is noted on scrotal ultrasound  The patient was reassured regarding these findings  We will continue to follow with serial exams  He is presently asymptomatic  Diagnoses and all orders for this visit:    Spermatocele of epididymis  -     POCT urine dip auto non-scope    Benign prostatic hyperplasia with urinary frequency          Subjective:      Patient ID: Neil Plan is a 46 y o  male  Chief complaint:  Left scrotal mass    HPI:  59-year-old male who noted a left scrotal mass on self-examination 1 month ago  Lesion was not painful  Has not been changing over the last month  He has no dysuria or gross hematuria  He is seen in the office today in consultation regarding go scrotal lesion  Benign Prostatic Hypertrophy   This is a chronic problem  The current episode started more than 1 year ago  The problem has been gradually worsening since onset  Irritative symptoms include nocturia and urgency  Obstructive symptoms include dribbling, incomplete emptying, an intermittent stream and a weak stream  Obstructive symptoms do not include a slower stream or straining  Associated symptoms include hesitancy  Pertinent negatives include no chills, dysuria, genital pain, hematuria, nausea or vomiting  Past treatments include tamsulosin  The treatment provided no relief  He has been using treatment for 1 to 7 days         The following portions of the patient's history were reviewed and updated as appropriate: allergies, current medications, past family history, past medical history, past social history, past surgical history and problem list     Review of Systems   Constitutional: Negative  Negative for chills, diaphoresis, fatigue and fever  HENT: Negative  Eyes: Negative  Respiratory: Positive for shortness of breath  Cardiovascular: Negative  Gastrointestinal: Negative  Negative for nausea and vomiting  Endocrine: Negative  Genitourinary: Positive for hesitancy, incomplete emptying, nocturia and urgency  Negative for dysuria and hematuria  See HPI   Musculoskeletal: Negative  Skin: Negative  Allergic/Immunologic: Negative  Neurological: Negative  Hematological: Negative  Psychiatric/Behavioral: Negative  AUA SYMPTOM SCORE      Most Recent Value   AUA SYMPTOM SCORE   How often have you had a sensation of not emptying your bladder completely after you finished urinating? 5   How often have you had to urinate again less than two hours after you finished urinating? 4   How often have you found you stopped and started again several times when you urinate? 4   How often have you found it difficult to postpone urination? 4   How often have you had a weak urinary stream?  3   How often have you had to push or strain to begin urination? 4   How many times did you most typically get up to urinate from the time you went to bed at night until the time you got up in the morning? 5   Quality of Life: If you were to spend the rest of your life with your urinary condition just the way it is now, how would you feel about that?  6   AUA SYMPTOM SCORE  29        Objective:      /78   Pulse 86   Ht 5' 10" (1 778 m)   Wt 82 1 kg (181 lb)   BMI 25 97 kg/m²          Physical Exam   Constitutional: He is oriented to person, place, and time  He appears well-developed and well-nourished  HENT:   Head: Normocephalic and atraumatic     Eyes: Conjunctivae are normal    Neck: Neck supple  Cardiovascular: Normal rate  Pulmonary/Chest: Effort normal    Abdominal: Soft  Bowel sounds are normal  He exhibits no distension and no mass  There is no tenderness  There is no rebound, no guarding and no CVA tenderness  Genitourinary: Rectum normal, testes normal and penis normal  Right testis shows no mass  Left testis shows no mass  No phimosis or hypospadias  Genitourinary Comments: Both testes are descended and small  There is a 1 cm left spermatocele  Prostate 1+ enlarged and palpably benign  Musculoskeletal: He exhibits no edema  Neurological: He is alert and oriented to person, place, and time  Skin: Skin is warm and dry  Psychiatric: He has a normal mood and affect  His behavior is normal  Judgment and thought content normal    Vitals reviewed

## 2019-03-26 ENCOUNTER — HOSPITAL ENCOUNTER (EMERGENCY)
Facility: HOSPITAL | Age: 49
Discharge: HOME | End: 2019-03-26
Attending: EMERGENCY MEDICINE
Payer: COMMERCIAL

## 2019-03-26 ENCOUNTER — APPOINTMENT (EMERGENCY)
Dept: CT IMAGING | Facility: HOSPITAL | Age: 49
End: 2019-03-26
Payer: COMMERCIAL

## 2019-03-26 VITALS
DIASTOLIC BLOOD PRESSURE: 74 MMHG | TEMPERATURE: 97.6 F | BODY MASS INDEX: 27.11 KG/M2 | WEIGHT: 188.93 LBS | RESPIRATION RATE: 18 BRPM | SYSTOLIC BLOOD PRESSURE: 131 MMHG | HEART RATE: 83 BPM | OXYGEN SATURATION: 100 %

## 2019-03-26 DIAGNOSIS — S76.219A GROIN STRAIN: Primary | ICD-10-CM

## 2019-03-26 DIAGNOSIS — N39.0 UTI (URINARY TRACT INFECTION): ICD-10-CM

## 2019-03-26 LAB
ALBUMIN SERPL BCP-MCNC: 4.3 G/DL (ref 3–5.2)
ALP SERPL-CCNC: 68 U/L (ref 43–122)
ALT SERPL W P-5'-P-CCNC: 15 U/L (ref 9–52)
ANION GAP SERPL CALCULATED.3IONS-SCNC: 8 MMOL/L (ref 5–14)
AST SERPL W P-5'-P-CCNC: 23 U/L (ref 17–59)
BACTERIA UR QL AUTO: ABNORMAL /HPF
BASOPHILS # BLD AUTO: 0.1 THOUSANDS/ΜL (ref 0–0.1)
BASOPHILS NFR BLD AUTO: 1 % (ref 0–1)
BILIRUB SERPL-MCNC: 0.4 MG/DL
BILIRUB UR QL STRIP: NEGATIVE
BUN SERPL-MCNC: 21 MG/DL (ref 5–25)
CALCIUM SERPL-MCNC: 9.4 MG/DL (ref 8.4–10.2)
CHLORIDE SERPL-SCNC: 99 MMOL/L (ref 97–108)
CLARITY UR: CLEAR
CO2 SERPL-SCNC: 29 MMOL/L (ref 22–30)
COLOR UR: ABNORMAL
CREAT SERPL-MCNC: 1.22 MG/DL (ref 0.7–1.5)
EOSINOPHIL # BLD AUTO: 0.1 THOUSAND/ΜL (ref 0–0.4)
EOSINOPHIL NFR BLD AUTO: 1 % (ref 0–6)
ERYTHROCYTE [DISTWIDTH] IN BLOOD BY AUTOMATED COUNT: 13.7 %
GFR SERPL CREATININE-BSD FRML MDRD: 79 ML/MIN/1.73SQ M
GLUCOSE SERPL-MCNC: 136 MG/DL (ref 70–99)
GLUCOSE UR STRIP-MCNC: NEGATIVE MG/DL
HCT VFR BLD AUTO: 39.3 % (ref 41–53)
HGB BLD-MCNC: 12.5 G/DL (ref 13.5–17.5)
HGB UR QL STRIP.AUTO: 25
KETONES UR STRIP-MCNC: NEGATIVE MG/DL
LEUKOCYTE ESTERASE UR QL STRIP: 100
LIPASE SERPL-CCNC: 55 U/L (ref 23–300)
LYMPHOCYTES # BLD AUTO: 2.3 THOUSANDS/ΜL (ref 0.5–4)
LYMPHOCYTES NFR BLD AUTO: 39 % (ref 25–45)
MCH RBC QN AUTO: 27.7 PG (ref 26–34)
MCHC RBC AUTO-ENTMCNC: 31.7 G/DL (ref 31–36)
MCV RBC AUTO: 87 FL (ref 80–100)
MONOCYTES # BLD AUTO: 0.5 THOUSAND/ΜL (ref 0.2–0.9)
MONOCYTES NFR BLD AUTO: 9 % (ref 1–10)
NEUTROPHILS # BLD AUTO: 3 THOUSANDS/ΜL (ref 1.8–7.8)
NEUTS SEG NFR BLD AUTO: 51 % (ref 45–65)
NITRITE UR QL STRIP: NEGATIVE
NON-SQ EPI CELLS URNS QL MICRO: ABNORMAL /HPF
PH UR STRIP.AUTO: 5 [PH]
PLATELET # BLD AUTO: 296 THOUSANDS/UL (ref 150–450)
PMV BLD AUTO: 7.8 FL (ref 8.9–12.7)
POTASSIUM SERPL-SCNC: 4.3 MMOL/L (ref 3.6–5)
PROT SERPL-MCNC: 7.3 G/DL (ref 5.9–8.4)
PROT UR STRIP-MCNC: NEGATIVE MG/DL
RBC # BLD AUTO: 4.5 MILLION/UL (ref 4.5–5.9)
RBC #/AREA URNS AUTO: ABNORMAL /HPF
SODIUM SERPL-SCNC: 136 MMOL/L (ref 137–147)
SP GR UR STRIP.AUTO: 1.02 (ref 1–1.04)
UROBILINOGEN UA: NEGATIVE MG/DL
WBC # BLD AUTO: 5.9 THOUSAND/UL (ref 4.5–11)
WBC #/AREA URNS AUTO: ABNORMAL /HPF

## 2019-03-26 PROCEDURE — 85025 COMPLETE CBC W/AUTO DIFF WBC: CPT | Performed by: PHYSICIAN ASSISTANT

## 2019-03-26 PROCEDURE — 87591 N.GONORRHOEAE DNA AMP PROB: CPT | Performed by: PHYSICIAN ASSISTANT

## 2019-03-26 PROCEDURE — 96361 HYDRATE IV INFUSION ADD-ON: CPT

## 2019-03-26 PROCEDURE — 81001 URINALYSIS AUTO W/SCOPE: CPT | Performed by: PHYSICIAN ASSISTANT

## 2019-03-26 PROCEDURE — 81003 URINALYSIS AUTO W/O SCOPE: CPT | Performed by: PHYSICIAN ASSISTANT

## 2019-03-26 PROCEDURE — 36415 COLL VENOUS BLD VENIPUNCTURE: CPT | Performed by: PHYSICIAN ASSISTANT

## 2019-03-26 PROCEDURE — 96374 THER/PROPH/DIAG INJ IV PUSH: CPT

## 2019-03-26 PROCEDURE — 74177 CT ABD & PELVIS W/CONTRAST: CPT

## 2019-03-26 PROCEDURE — 99284 EMERGENCY DEPT VISIT MOD MDM: CPT

## 2019-03-26 PROCEDURE — 83690 ASSAY OF LIPASE: CPT | Performed by: PHYSICIAN ASSISTANT

## 2019-03-26 PROCEDURE — 87491 CHLMYD TRACH DNA AMP PROBE: CPT | Performed by: PHYSICIAN ASSISTANT

## 2019-03-26 PROCEDURE — 87086 URINE CULTURE/COLONY COUNT: CPT | Performed by: PHYSICIAN ASSISTANT

## 2019-03-26 PROCEDURE — 80053 COMPREHEN METABOLIC PANEL: CPT | Performed by: PHYSICIAN ASSISTANT

## 2019-03-26 RX ORDER — SODIUM CHLORIDE 9 MG/ML
250 INJECTION, SOLUTION INTRAVENOUS CONTINUOUS
Status: DISCONTINUED | OUTPATIENT
Start: 2019-03-26 | End: 2019-03-26 | Stop reason: HOSPADM

## 2019-03-26 RX ORDER — HYDROCODONE BITARTRATE AND ACETAMINOPHEN 5; 325 MG/1; MG/1
1 TABLET ORAL EVERY 6 HOURS PRN
Qty: 12 TABLET | Refills: 0 | Status: SHIPPED | OUTPATIENT
Start: 2019-03-26 | End: 2019-03-29

## 2019-03-26 RX ORDER — MORPHINE SULFATE 4 MG/ML
4 INJECTION, SOLUTION INTRAMUSCULAR; INTRAVENOUS ONCE
Status: COMPLETED | OUTPATIENT
Start: 2019-03-26 | End: 2019-03-26

## 2019-03-26 RX ORDER — DOCUSATE SODIUM 100 MG/1
100 CAPSULE, LIQUID FILLED ORAL EVERY 12 HOURS
Qty: 60 CAPSULE | Refills: 0 | Status: ON HOLD | OUTPATIENT
Start: 2019-03-26 | End: 2019-05-23 | Stop reason: ALTCHOICE

## 2019-03-26 RX ORDER — CEPHALEXIN 500 MG/1
500 CAPSULE ORAL EVERY 8 HOURS SCHEDULED
Qty: 30 CAPSULE | Refills: 0 | Status: SHIPPED | OUTPATIENT
Start: 2019-03-26 | End: 2019-04-05

## 2019-03-26 RX ORDER — KETOROLAC TROMETHAMINE 30 MG/ML
30 INJECTION, SOLUTION INTRAMUSCULAR; INTRAVENOUS ONCE
Status: DISCONTINUED | OUTPATIENT
Start: 2019-03-26 | End: 2019-03-26

## 2019-03-26 RX ADMIN — IOHEXOL 100 ML: 350 INJECTION, SOLUTION INTRAVENOUS at 19:12

## 2019-03-26 RX ADMIN — MORPHINE SULFATE 4 MG: 4 INJECTION INTRAVENOUS at 18:30

## 2019-03-26 RX ADMIN — SODIUM CHLORIDE 250 ML/HR: 9 INJECTION, SOLUTION INTRAVENOUS at 18:19

## 2019-03-26 NOTE — ED PROVIDER NOTES
History  Chief Complaint   Patient presents with    Groin Pain     Pt c/o right testicle pain and a "lump" above testicle x "a couple of days"  Pt states "It feels like my hernia busted through", while lifting a box  Pt denies urinary symptoms  Medical Problem   Location:  Pt right groin pain x 3 days  much worse today  Severity:  Moderate  Onset quality:  Gradual  Duration:  3 days  Timing:  Constant  Progression:  Unchanged  Chronicity:  New  Context:  Right hernia surgery about 1 year ago   Associated symptoms: no abdominal pain, no chest pain, no congestion, no cough, no diarrhea, no ear pain, no fatigue, no fever, no headaches, no loss of consciousness, no myalgias, no nausea, no rash, no rhinorrhea, no shortness of breath, no sore throat, no vomiting and no wheezing        Prior to Admission Medications   Prescriptions Last Dose Informant Patient Reported? Taking?    ACCU-CHEK FASTCLIX LANCETS MISC   No No   Sig: by Does not apply route daily   Blood Glucose Monitoring Suppl (ACCU-CHEK GUIDE) w/Device KIT   No No   Sig: by Does not apply route daily   DIVALPROEX SODIUM PO   Yes No   Sig: Take 25 mg by mouth 3 (three) times a day   albuterol (PROAIR HFA) 90 mcg/act inhaler   No No   Sig: Inhale 2 puffs every 4 (four) hours as needed for wheezing or shortness of breath   emtricitabine-tenofovir (TRUVADA) 200-300 mg per tablet   No No   Sig: Take 1 tablet by mouth every 24 hours   glucose blood (ACCU-CHEK GUIDE) test strip   No No   Si each by Other route 4 (four) times a day Use as instructed   hydrOXYzine pamoate (VISTARIL) 50 mg capsule   Yes No   Sig: Take 50 mg by mouth daily   metFORMIN (GLUCOPHAGE) 500 mg tablet   No No   Sig: Take 1 tablet (500 mg total) by mouth 2 (two) times a day with meals   mirtazapine (REMERON) 15 mg tablet Not Taking at Unknown time  Yes No   Sig: Take 15 mg by mouth daily at bedtime   nicotine (NICODERM CQ) 7 mg/24hr TD 24 hr patch   No No   Sig: Place 1 patch on the skin every 24 hours   risperiDONE (RisperDAL) 4 mg tablet   Yes No   Sig: Take 4 mg by mouth daily     rivaroxaban (XARELTO) 20 mg tablet   No No   Sig: Take 1 tablet (20 mg total) by mouth every 24 hours   sildenafil (VIAGRA) 50 MG tablet Not Taking at Unknown time  No No   Sig: Take 1 tablet (50 mg total) by mouth daily as needed for erectile dysfunction   Patient not taking: Reported on 3/14/2019   tamsulosin (FLOMAX) 0 4 mg   No No   Sig: Take 1 capsule (0 4 mg total) by mouth daily with dinner      Facility-Administered Medications: None       Past Medical History:   Diagnosis Date    Anemia     Anxiety     Arthritis     Asthma     Depression     [facility] mellitus ([facility] )     Enlarged prostate     Epilepsy ([place] [address] )     Hypertension     Psychiatric disorder     bipolar    Psychiatric illness     Psychosis ([place] [address] )     Sleep apnea        Past Surgical History:   Procedure Laterality Date    CYST REMOVAL      ESOPHAGOGASTRODUODENOSCOPY N/A 11/9/2016    Procedure: ESOPHAGOGASTRODUODENOSCOPY (EGD); Surgeon: Claribel Brandon MD;  Location: [facility]; Service:     ESOPHAGOGASTRODUODENOSCOPY N/A 9/2/2016    Procedure: ESOPHAGOGASTRODUODENOSCOPY (EGD); Surgeon: Claribel Brandon MD;  Location: [facility];   Service:     HERNIA REPAIR         Family History   Problem Relation Age of Onset    No Known Problems Mother     Colon cancer Father 61    No Known Problems Sister     No Known Problems Brother     No Known Problems Maternal Aunt     No Known Problems Paternal Aunt     No Known Problems Maternal Uncle     No Known Problems Paternal Uncle     No Known Problems Maternal Grandfather     No Known Problems Maternal Grandmother     No Known Problems Paternal Grandfather     Hypertension Paternal Grandmother     No Known Problems Cousin     Prostate cancer Family     Stroke Family     Diabetes Family         MELLITUS    Coronary artery disease Family      I have reviewed and agree with the history as documented  Social History     Tobacco Use    Smoking status: Current Every Day Smoker     Packs/day: 0 20     Years: 15 00     Pack years: 3 00    Smokeless tobacco: Current User    Tobacco comment: smokes occasionally; LIGHT TOBACCO SMOKER, TOBACCO USE, CIGARETTE, 2 CIGARETTES/DAY AS PER NEXTGEN   Substance Use Topics    Alcohol use: Yes     Alcohol/week: 7 2 oz     Types: 12 Cans of beer per week     Comment: "bout a case a week"    Drug use: Yes     Types: Marijuana, Cocaine     Comment: last use 4 days ago        Review of Systems   Constitutional: Negative  Negative for fatigue and fever  HENT: Negative  Negative for congestion, ear pain, rhinorrhea and sore throat  Eyes: Negative  Respiratory: Negative  Negative for cough, shortness of breath and wheezing  Cardiovascular: Negative  Negative for chest pain  Gastrointestinal: Negative  Negative for abdominal pain, diarrhea, nausea and vomiting  Endocrine: Negative  Genitourinary: Negative  Musculoskeletal: Negative  Negative for myalgias  Skin: Negative  Negative for rash  Allergic/Immunologic: Negative  Neurological: Negative  Negative for loss of consciousness and headaches  Hematological: Negative  Psychiatric/Behavioral: Negative  All other systems reviewed and are negative  Physical Exam  Physical Exam   Constitutional: He is oriented to person, place, and time  He appears well-developed  HENT:   Head: Normocephalic  Right Ear: External ear normal    Left Ear: External ear normal    Nose: Nose normal    Mouth/Throat: Oropharynx is clear and moist    Eyes: Pupils are equal, round, and reactive to light  Conjunctivae and EOM are normal    Neck: Normal range of motion  Cardiovascular: Normal rate, regular rhythm and normal heart sounds  Pulmonary/Chest: Effort normal and breath sounds normal    Abdominal: Soft   Bowel sounds are normal    Right groin pain  Surgical incision for hernia pain Femoral pulse strong   Minimal tender  to testes and epididymitis    Musculoskeletal: Normal range of motion  Neurological: He is alert and oriented to person, place, and time  Skin: Skin is warm  Psychiatric: He has a normal mood and affect  His behavior is normal    Nursing note and vitals reviewed  Vital Signs  ED Triage Vitals [03/26/19 1719]   Temperature Pulse Respirations Blood Pressure SpO2   97 6 °F (36 4 °C) 84 16 119/84 99 %      Temp Source Heart Rate Source Patient Position - Orthostatic VS BP Location FiO2 (%)   Tympanic Monitor Sitting Left arm --      Pain Score       7           Vitals:    03/26/19 1719 03/26/19 1829   BP: 119/84 131/74   Pulse: 84 83   Patient Position - Orthostatic VS: Sitting Lying         Visual Acuity      ED Medications  Medications   morphine (PF) 4 mg/mL injection 4 mg (4 mg Intravenous Given 3/26/19 1830)   iohexol (OMNIPAQUE) 350 MG/ML injection (MULTI-DOSE) 100 mL (100 mL Intravenous Given 3/26/19 1912)       Diagnostic Studies  Results Reviewed     Procedure Component Value Units Date/Time    Urine Microscopic [[id number]]  (Abnormal) Collected:  03/26/19 1812    Lab Status:  Final result Specimen:  Urine, Other Updated:  03/26/19 1845     RBC, UA 4-10 /hpf      WBC, UA 10-20 /hpf      Epithelial Cells Occasional /hpf      Bacteria, UA Occasional /hpf     Urine culture [[id number]] Collected:  03/26/19 1812    Lab Status:   In process Specimen:  Urine, Other Updated:  03/26/19 1845    Lipase [[id number]]  (Normal) Collected:  03/26/19 1817    Lab Status:  Final result Specimen:  Blood from Arm, Right Updated:  03/26/19 1838     Lipase 55 u/L     Comprehensive metabolic panel [[id number]]  (Abnormal) Collected:  03/26/19 1817    Lab Status:  Final result Specimen:  Blood from Arm, Right Updated:  03/26/19 1838     Sodium 136 mmol/L      Potassium 4 3 mmol/L      Chloride 99 mmol/L      CO2 29 mmol/L      ANION GAP 8 mmol/L      BUN 21 mg/dL      Creatinine 1 22 mg/dL      Glucose 136 mg/dL      Calcium 9 4 mg/dL      AST 23 U/L      ALT 15 U/L      Alkaline Phosphatase 68 U/L      Total Protein 7 3 g/dL      Albumin 4 3 g/dL      Total Bilirubin 0 40 mg/dL      eGFR 79 ml/min/1 73sq m     Narrative:       National Kidney Disease Education Program recommendations are as follows:  GFR calculation is accurate only with a steady state creatinine  Chronic Kidney disease less than 60 ml/min/1 73 sq  meters  Kidney failure less than 15 ml/min/1 73 sq  meters  UA w Reflex to Microscopic w Reflex to Culture [[id number]]  (Abnormal) Collected:  03/26/19 1812    Lab Status:  Final result Specimen:  Urine, Other Updated:  03/26/19 1836     Color, UA Straw     Clarity, UA Clear     Specific [place], UA 1 020     pH, UA 5 0     Leukocytes,  0     Nitrite, UA Negative     Protein, UA Negative mg/dl      Glucose, UA Negative mg/dl      Ketones, UA Negative mg/dl      Bilirubin, UA Negative     Blood, UA 25 0     UROBILINOGEN UA Negative mg/dL     CBC and differential [[id number]]  (Abnormal) Collected:  03/26/19 1817    Lab Status:  Final result Specimen:  Blood from Arm, Right Updated:  03/26/19 1833     WBC 5 90 Thousand/uL      RBC 4 50 Million/uL      Hemoglobin 12 5 g/dL      Hematocrit 39 3 %      MCV 87 fL      MCH 27 7 pg      MCHC 31 7 g/dL      RDW 13 7 %      MPV 7 8 fL      Platelets 611 Thousands/uL      Neutrophils Relative 51 %      Lymphocytes Relative 39 %      Monocytes Relative 9 %      Eosinophils Relative 1 %      Basophils Relative 1 %      Neutrophils Absolute 3 00 Thousands/µL      Lymphocytes Absolute 2 30 Thousands/µL      Monocytes Absolute 0 50 Thousand/µL      Eosinophils Absolute 0 10 Thousand/µL      Basophils Absolute 0 10 Thousands/µL     Chlamydia/GC amplified DNA by PCR [[id number]] Collected:  03/26/19 1812    Lab Status:   In process Specimen:  Urine, Other Updated:  03/26/19 1833                 CT abdomen pelvis with contrast   Final Result by Micky Mcrae MD (03/26 1944)         1  Findings suggestive of mild enteritis  No evidence of bowel obstruction  2   Stable postsurgical change from prior right inguinal herniorrhaphy  No evidence of new hernia  Workstation performed: [id number]                    Procedures  Procedures       Phone Contacts  ED Phone Contact    ED Course                               MDM    Disposition  Final diagnoses:   Groin strain   UTI (urinary tract infection)     Time reflects when diagnosis was documented in both MDM as applicable and the Disposition within this note     Time User Action Codes Description Comment    3/26/2019  8:12 PM Reuel Height  Add [S76 219A] Groin strain     3/26/2019  8:16 PM Reuel Height  Add [N39 0] UTI (urinary tract infection)       ED Disposition     ED Disposition Condition Date/Time Comment    Discharge Stable Tue Mar 26, 2019  8:12 PM Gary Riley discharge to home/self care              Follow-up Information     Follow up With Specialties Details Why Contact Info    Stephen Severino MD Family Medicine   [address] [facility]  [phone]            Discharge Medication List as of 3/26/2019  8:18 PM      START taking these medications    Details   cephalexin (KEFLEX) 500 mg capsule Take 1 capsule (500 mg total) by mouth every 8 (eight) hours for 10 days, Starting Tue 3/26/2019, Until Fri 4/5/2019, Print      HYDROcodone-acetaminophen (NORCO) 5-325 mg per tablet Take 1 tablet by mouth every 6 (six) hours as needed (pain) for up to 3 daysMax Daily Amount: 4 tablets, Starting Tue 3/26/2019, Until Fri 3/29/2019, Print         CONTINUE these medications which have NOT CHANGED    Details   ACCU-CHEK FASTCLIX LANCETS MISC by Does not apply route daily, Starting Wed 11/28/2018, Normal      albuterol (PROAIR HFA) 90 mcg/act inhaler Inhale 2 puffs every 4 (four) hours as needed for wheezing or shortness of breath, Starting Wed 11/28/2018, Normal      Blood Glucose Monitoring Suppl (ACCU-CHEK GUIDE) w/Device KIT by Does not apply route daily, Starting Wed 11/28/2018, Normal      DIVALPROEX SODIUM PO Take 25 mg by mouth 3 (three) times a day, Historical Med      emtricitabine-tenofovir (TRUVADA) 200-300 mg per tablet Take 1 tablet by mouth every 24 hours, Starting Fri 3/1/2019, Normal      glucose blood (ACCU-CHEK GUIDE) test strip 1 each by Other route 4 (four) times a day Use as instructed, Starting Wed 11/28/2018, Normal      hydrOXYzine pamoate (VISTARIL) 50 mg capsule Take 50 mg by mouth daily, Starting Wed 4/8/2015, Historical Med      metFORMIN (GLUCOPHAGE) 500 mg tablet Take 1 tablet (500 mg total) by mouth 2 (two) times a day with meals, Starting Fri 3/1/2019, Normal      mirtazapine (REMERON) 15 mg tablet Take 15 mg by mouth daily at bedtime, Until Discontinued, Historical Med      nicotine (NICODERM CQ) 7 mg/24hr TD 24 hr patch Place 1 patch on the skin every 24 hours, Starting Fri 3/1/2019, Normal      risperiDONE (RisperDAL) 4 mg tablet Take 4 mg by mouth daily  , Historical Med      rivaroxaban (XARELTO) 20 mg tablet Take 1 tablet (20 mg total) by mouth every 24 hours, Starting Fri 3/1/2019, Normal      sildenafil (VIAGRA) 50 MG tablet Take 1 tablet (50 mg total) by mouth daily as needed for erectile dysfunction, Starting Wed 3/13/2019, Normal      tamsulosin (FLOMAX) 0 4 mg Take 1 capsule (0 4 mg total) by mouth daily with dinner, Starting Wed 3/13/2019, Normal           No discharge procedures on file      ED Provider  Electronically Signed by           Jacques Pena PA-C  03/26/19 7793

## 2019-03-27 LAB
BACTERIA UR CULT: NORMAL
C TRACH DNA SPEC QL NAA+PROBE: NEGATIVE
N GONORRHOEA DNA SPEC QL NAA+PROBE: NEGATIVE

## 2019-04-22 ENCOUNTER — PROCEDURE VISIT (OUTPATIENT)
Dept: UROLOGY | Facility: MEDICAL CENTER | Age: 49
End: 2019-04-22
Payer: COMMERCIAL

## 2019-04-22 VITALS
HEIGHT: 70 IN | DIASTOLIC BLOOD PRESSURE: 70 MMHG | BODY MASS INDEX: 26.92 KG/M2 | WEIGHT: 188 LBS | HEART RATE: 86 BPM | SYSTOLIC BLOOD PRESSURE: 120 MMHG

## 2019-04-22 DIAGNOSIS — N35.011 POST-TRAUMATIC BULBOUS URETHRAL STRICTURE: ICD-10-CM

## 2019-04-22 DIAGNOSIS — N43.40 SPERMATOCELE OF EPIDIDYMIS: Primary | ICD-10-CM

## 2019-04-22 DIAGNOSIS — D68.59 PROTEIN S DEFICIENCY (HCC): ICD-10-CM

## 2019-04-22 DIAGNOSIS — E11.9 TYPE 2 DIABETES MELLITUS WITHOUT COMPLICATION, WITHOUT LONG-TERM CURRENT USE OF INSULIN (HCC): ICD-10-CM

## 2019-04-22 DIAGNOSIS — F41.1 GENERALIZED ANXIETY DISORDER: Chronic | ICD-10-CM

## 2019-04-22 DIAGNOSIS — N40.1 BENIGN PROSTATIC HYPERPLASIA WITH URINARY FREQUENCY: ICD-10-CM

## 2019-04-22 DIAGNOSIS — Z20.6 HIV EXPOSURE: ICD-10-CM

## 2019-04-22 DIAGNOSIS — F25.0 SCHIZOAFFECTIVE DISORDER, BIPOLAR TYPE (HCC): Chronic | ICD-10-CM

## 2019-04-22 DIAGNOSIS — R35.0 BENIGN PROSTATIC HYPERPLASIA WITH URINARY FREQUENCY: ICD-10-CM

## 2019-04-22 DIAGNOSIS — G40.802 OTHER EPILEPSY WITHOUT STATUS EPILEPTICUS, NOT INTRACTABLE (HCC): Chronic | ICD-10-CM

## 2019-04-22 DIAGNOSIS — F17.200 TOBACCO DEPENDENCE: ICD-10-CM

## 2019-04-22 DIAGNOSIS — J45.20 MILD INTERMITTENT ASTHMA WITHOUT COMPLICATION: ICD-10-CM

## 2019-04-22 LAB
SL AMB  POCT GLUCOSE, UA: ABNORMAL
SL AMB LEUKOCYTE ESTERASE,UA: ABNORMAL
SL AMB POCT BILIRUBIN,UA: ABNORMAL
SL AMB POCT BLOOD,UA: ABNORMAL
SL AMB POCT CLARITY,UA: CLEAR
SL AMB POCT COLOR,UA: YELLOW
SL AMB POCT KETONES,UA: ABNORMAL
SL AMB POCT NITRITE,UA: ABNORMAL
SL AMB POCT PH,UA: 5.5
SL AMB POCT SPECIFIC GRAVITY,UA: 1.02
SL AMB POCT URINE PROTEIN: ABNORMAL
SL AMB POCT UROBILINOGEN: 0.2

## 2019-04-22 PROCEDURE — 52000 CYSTOURETHROSCOPY: CPT | Performed by: UROLOGY

## 2019-04-22 PROCEDURE — 99214 OFFICE O/P EST MOD 30 MIN: CPT | Performed by: UROLOGY

## 2019-04-22 PROCEDURE — 81003 URINALYSIS AUTO W/O SCOPE: CPT | Performed by: UROLOGY

## 2019-04-22 RX ORDER — CEFAZOLIN SODIUM 2 G/50ML
2000 SOLUTION INTRAVENOUS ONCE
Status: CANCELLED | OUTPATIENT
Start: 2019-05-30 | End: 2019-04-22

## 2019-04-23 ENCOUNTER — PREP FOR PROCEDURE (OUTPATIENT)
Dept: UROLOGY | Facility: MEDICAL CENTER | Age: 49
End: 2019-04-23

## 2019-04-23 DIAGNOSIS — N35.011 POST-TRAUMATIC BULBOUS URETHRAL STRICTURE: Primary | ICD-10-CM

## 2019-05-01 ENCOUNTER — TELEPHONE (OUTPATIENT)
Dept: FAMILY MEDICINE CLINIC | Facility: CLINIC | Age: 49
End: 2019-05-01

## 2019-05-06 ENCOUNTER — APPOINTMENT (OUTPATIENT)
Dept: PREADMISSION TESTING | Facility: HOSPITAL | Age: 49
End: 2019-05-06
Payer: COMMERCIAL

## 2019-05-06 DIAGNOSIS — Z01.818 PREOP TESTING: Primary | ICD-10-CM

## 2019-05-06 LAB
ALBUMIN SERPL BCP-MCNC: 3.9 G/DL (ref 3–5.2)
ALP SERPL-CCNC: 65 U/L (ref 43–122)
ALT SERPL W P-5'-P-CCNC: 18 U/L (ref 9–52)
ANION GAP SERPL CALCULATED.3IONS-SCNC: 7 MMOL/L (ref 5–14)
APTT PPP: 27 SECONDS (ref 23–34)
AST SERPL W P-5'-P-CCNC: 23 U/L (ref 17–59)
BASOPHILS # BLD AUTO: 0 THOUSANDS/ΜL (ref 0–0.1)
BASOPHILS NFR BLD AUTO: 1 % (ref 0–1)
BILIRUB SERPL-MCNC: 0.5 MG/DL
BUN SERPL-MCNC: 17 MG/DL (ref 5–25)
CALCIUM SERPL-MCNC: 9.7 MG/DL (ref 8.4–10.2)
CHLORIDE SERPL-SCNC: 103 MMOL/L (ref 97–108)
CO2 SERPL-SCNC: 29 MMOL/L (ref 22–30)
CREAT SERPL-MCNC: 1.24 MG/DL (ref 0.7–1.5)
EOSINOPHIL # BLD AUTO: 0.1 THOUSAND/ΜL (ref 0–0.4)
EOSINOPHIL NFR BLD AUTO: 1 % (ref 0–6)
ERYTHROCYTE [DISTWIDTH] IN BLOOD BY AUTOMATED COUNT: 13.5 %
EST. AVERAGE GLUCOSE BLD GHB EST-MCNC: 143 MG/DL
GFR SERPL CREATININE-BSD FRML MDRD: 78 ML/MIN/1.73SQ M
GLUCOSE SERPL-MCNC: 106 MG/DL (ref 70–99)
HBA1C MFR BLD: 6.6 % (ref 4.2–6.3)
HCT VFR BLD AUTO: 40.3 % (ref 41–53)
HGB BLD-MCNC: 13 G/DL (ref 13.5–17.5)
INR PPP: 0.99 (ref 0.89–1.1)
LYMPHOCYTES # BLD AUTO: 2.4 THOUSANDS/ΜL (ref 0.5–4)
LYMPHOCYTES NFR BLD AUTO: 54 % (ref 25–45)
MCH RBC QN AUTO: 27.7 PG (ref 26–34)
MCHC RBC AUTO-ENTMCNC: 32.4 G/DL (ref 31–36)
MCV RBC AUTO: 86 FL (ref 80–100)
MONOCYTES # BLD AUTO: 0.4 THOUSAND/ΜL (ref 0.2–0.9)
MONOCYTES NFR BLD AUTO: 10 % (ref 1–10)
NEUTROPHILS # BLD AUTO: 1.5 THOUSANDS/ΜL (ref 1.8–7.8)
NEUTS SEG NFR BLD AUTO: 34 % (ref 45–65)
PLATELET # BLD AUTO: 351 THOUSANDS/UL (ref 150–450)
PMV BLD AUTO: 7.7 FL (ref 8.9–12.7)
POTASSIUM SERPL-SCNC: 4.3 MMOL/L (ref 3.6–5)
PROT SERPL-MCNC: 6.6 G/DL (ref 5.9–8.4)
PROTHROMBIN TIME: 10.5 SECONDS (ref 9.5–11.6)
RBC # BLD AUTO: 4.71 MILLION/UL (ref 4.5–5.9)
SODIUM SERPL-SCNC: 139 MMOL/L (ref 137–147)
WBC # BLD AUTO: 4.5 THOUSAND/UL (ref 4.5–11)

## 2019-05-06 PROCEDURE — 85025 COMPLETE CBC W/AUTO DIFF WBC: CPT

## 2019-05-06 PROCEDURE — 80053 COMPREHEN METABOLIC PANEL: CPT

## 2019-05-06 PROCEDURE — 87086 URINE CULTURE/COLONY COUNT: CPT

## 2019-05-06 PROCEDURE — 85730 THROMBOPLASTIN TIME PARTIAL: CPT

## 2019-05-06 PROCEDURE — 85610 PROTHROMBIN TIME: CPT

## 2019-05-06 PROCEDURE — 83036 HEMOGLOBIN GLYCOSYLATED A1C: CPT

## 2019-05-07 LAB — BACTERIA UR CULT: NORMAL

## 2019-05-08 ENCOUNTER — TELEPHONE (OUTPATIENT)
Dept: UROLOGY | Facility: MEDICAL CENTER | Age: 49
End: 2019-05-08

## 2019-05-08 DIAGNOSIS — N35.011 POST-TRAUMATIC BULBOUS URETHRAL STRICTURE: Primary | ICD-10-CM

## 2019-05-08 DIAGNOSIS — N35.011 POST-TRAUMATIC BULBOUS URETHRAL STRICTURE: ICD-10-CM

## 2019-05-10 ENCOUNTER — OFFICE VISIT (OUTPATIENT)
Dept: FAMILY MEDICINE CLINIC | Facility: CLINIC | Age: 49
End: 2019-05-10

## 2019-05-10 VITALS
RESPIRATION RATE: 16 BRPM | TEMPERATURE: 97.6 F | BODY MASS INDEX: 27.41 KG/M2 | SYSTOLIC BLOOD PRESSURE: 100 MMHG | HEART RATE: 76 BPM | DIASTOLIC BLOOD PRESSURE: 68 MMHG | OXYGEN SATURATION: 97 % | WEIGHT: 191 LBS

## 2019-05-10 DIAGNOSIS — D68.59 PROTEIN S DEFICIENCY (HCC): ICD-10-CM

## 2019-05-10 DIAGNOSIS — Z01.818 PRE-OP EXAMINATION: ICD-10-CM

## 2019-05-10 DIAGNOSIS — E11.9 TYPE 2 DIABETES MELLITUS WITHOUT COMPLICATION, WITHOUT LONG-TERM CURRENT USE OF INSULIN (HCC): Primary | ICD-10-CM

## 2019-05-10 DIAGNOSIS — R07.89 OTHER CHEST PAIN: ICD-10-CM

## 2019-05-10 PROCEDURE — 99214 OFFICE O/P EST MOD 30 MIN: CPT | Performed by: INTERNAL MEDICINE

## 2019-05-10 PROCEDURE — 93000 ELECTROCARDIOGRAM COMPLETE: CPT | Performed by: INTERNAL MEDICINE

## 2019-05-10 RX ORDER — ATORVASTATIN CALCIUM 40 MG/1
40 TABLET, FILM COATED ORAL DAILY
Qty: 30 TABLET | Refills: 2 | Status: SHIPPED | OUTPATIENT
Start: 2019-05-10 | End: 2019-05-10 | Stop reason: ALTCHOICE

## 2019-05-10 RX ORDER — DIVALPROEX SODIUM 250 MG/1
TABLET, DELAYED RELEASE ORAL
Status: ON HOLD | COMMUNITY
Start: 2015-04-08 | End: 2019-05-23 | Stop reason: ALTCHOICE

## 2019-05-10 RX ORDER — ATORVASTATIN CALCIUM 10 MG/1
10 TABLET, FILM COATED ORAL DAILY
Qty: 30 TABLET | Refills: 2 | Status: SHIPPED | OUTPATIENT
Start: 2019-05-10 | End: 2019-08-28 | Stop reason: SDUPTHER

## 2019-05-12 ENCOUNTER — HOSPITAL ENCOUNTER (EMERGENCY)
Facility: HOSPITAL | Age: 49
Discharge: HOME | End: 2019-05-12
Attending: EMERGENCY MEDICINE
Payer: COMMERCIAL

## 2019-05-12 VITALS
BODY MASS INDEX: 27.62 KG/M2 | HEART RATE: 81 BPM | OXYGEN SATURATION: 100 % | TEMPERATURE: 95.7 F | SYSTOLIC BLOOD PRESSURE: 127 MMHG | DIASTOLIC BLOOD PRESSURE: 69 MMHG | WEIGHT: 192.5 LBS | RESPIRATION RATE: 15 BRPM

## 2019-05-12 DIAGNOSIS — H10.9 CONJUNCTIVITIS: Primary | ICD-10-CM

## 2019-05-12 PROCEDURE — 99282 EMERGENCY DEPT VISIT SF MDM: CPT | Performed by: PHYSICIAN ASSISTANT

## 2019-05-12 PROCEDURE — 99282 EMERGENCY DEPT VISIT SF MDM: CPT

## 2019-05-12 RX ORDER — KETOTIFEN FUMARATE 0.35 MG/ML
1 SOLUTION/ DROPS OPHTHALMIC 2 TIMES DAILY
Qty: 5 ML | Refills: 0 | Status: ON HOLD | OUTPATIENT
Start: 2019-05-12 | End: 2019-05-23 | Stop reason: ALTCHOICE

## 2019-05-12 RX ORDER — GENTAMICIN SULFATE 3 MG/ML
2 SOLUTION/ DROPS OPHTHALMIC 4 TIMES DAILY
Qty: 5 ML | Refills: 0 | Status: ON HOLD | OUTPATIENT
Start: 2019-05-12 | End: 2019-05-23 | Stop reason: ALTCHOICE

## 2019-05-12 RX ADMIN — FLUORESCEIN SODIUM 1 STRIP: 1 STRIP OPHTHALMIC at 10:04

## 2019-05-22 ENCOUNTER — ANESTHESIA EVENT (OUTPATIENT)
Dept: SURGERY | Facility: HOSPITAL | Age: 49
End: 2019-05-22

## 2019-05-22 PROCEDURE — NC001 PR NO CHARGE: Performed by: UROLOGY

## 2019-05-23 ENCOUNTER — HOSPITAL ENCOUNTER (OUTPATIENT)
Facility: HOSPITAL | Age: 49
Setting detail: OUTPATIENT SURGERY
Discharge: HOME | End: 2019-05-23
Attending: UROLOGY | Admitting: UROLOGY
Payer: COMMERCIAL

## 2019-05-23 ENCOUNTER — ANESTHESIA (OUTPATIENT)
Dept: SURGERY | Facility: HOSPITAL | Age: 49
End: 2019-05-23

## 2019-05-23 VITALS
HEIGHT: 70 IN | TEMPERATURE: 97.8 F | HEART RATE: 92 BPM | RESPIRATION RATE: 18 BRPM | DIASTOLIC BLOOD PRESSURE: 87 MMHG | OXYGEN SATURATION: 95 % | BODY MASS INDEX: 26.05 KG/M2 | SYSTOLIC BLOOD PRESSURE: 139 MMHG | WEIGHT: 182 LBS

## 2019-05-23 DIAGNOSIS — Z01.818 PREOP TESTING: ICD-10-CM

## 2019-05-23 RX ORDER — CEFAZOLIN SODIUM 2 G/50ML
2000 SOLUTION INTRAVENOUS ONCE
Status: DISCONTINUED | OUTPATIENT
Start: 2019-05-23 | End: 2019-05-24 | Stop reason: HOSPADM

## 2019-05-24 ENCOUNTER — TELEPHONE (OUTPATIENT)
Dept: UROLOGY | Facility: MEDICAL CENTER | Age: 49
End: 2019-05-24

## 2019-05-29 ENCOUNTER — ANESTHESIA EVENT (OUTPATIENT)
Dept: PERIOP | Facility: HOSPITAL | Age: 49
End: 2019-05-29

## 2019-05-30 ENCOUNTER — ANESTHESIA (OUTPATIENT)
Dept: PERIOP | Facility: HOSPITAL | Age: 49
End: 2019-05-30

## 2019-05-30 ENCOUNTER — TELEPHONE (OUTPATIENT)
Dept: UROLOGY | Facility: MEDICAL CENTER | Age: 49
End: 2019-05-30

## 2019-06-06 ENCOUNTER — APPOINTMENT (OUTPATIENT)
Dept: PREADMISSION TESTING | Facility: HOSPITAL | Age: 49
End: 2019-06-06
Payer: COMMERCIAL

## 2019-06-06 DIAGNOSIS — Z01.818 PREOP TESTING: Primary | ICD-10-CM

## 2019-06-06 PROCEDURE — 87086 URINE CULTURE/COLONY COUNT: CPT

## 2019-06-07 LAB — BACTERIA UR CULT: ABNORMAL

## 2019-06-12 ENCOUNTER — ANESTHESIA EVENT (OUTPATIENT)
Dept: PERIOP | Facility: HOSPITAL | Age: 49
End: 2019-06-12

## 2019-06-13 ENCOUNTER — ANESTHESIA (OUTPATIENT)
Dept: PERIOP | Facility: HOSPITAL | Age: 49
End: 2019-06-13

## 2019-06-13 ENCOUNTER — HOSPITAL ENCOUNTER (OUTPATIENT)
Facility: HOSPITAL | Age: 49
Setting detail: OUTPATIENT SURGERY
Discharge: HOME | End: 2019-06-13
Attending: UROLOGY | Admitting: UROLOGY
Payer: COMMERCIAL

## 2019-06-13 VITALS
OXYGEN SATURATION: 96 % | TEMPERATURE: 97.4 F | RESPIRATION RATE: 16 BRPM | SYSTOLIC BLOOD PRESSURE: 120 MMHG | HEART RATE: 77 BPM | BODY MASS INDEX: 26.48 KG/M2 | DIASTOLIC BLOOD PRESSURE: 65 MMHG | WEIGHT: 185 LBS | HEIGHT: 70 IN

## 2019-06-13 DIAGNOSIS — Z01.818 PREOP TESTING: ICD-10-CM

## 2019-06-13 LAB — GLUCOSE SERPL-MCNC: 136 MG/DL (ref 65–140)

## 2019-06-13 PROCEDURE — 82948 REAGENT STRIP/BLOOD GLUCOSE: CPT

## 2019-06-13 RX ORDER — SODIUM CHLORIDE, SODIUM LACTATE, POTASSIUM CHLORIDE, CALCIUM CHLORIDE 600; 310; 30; 20 MG/100ML; MG/100ML; MG/100ML; MG/100ML
125 INJECTION, SOLUTION INTRAVENOUS CONTINUOUS
Status: DISCONTINUED | OUTPATIENT
Start: 2019-06-13 | End: 2019-06-14 | Stop reason: HOSPADM

## 2019-06-13 RX ORDER — CEFAZOLIN SODIUM 2 G/50ML
2000 SOLUTION INTRAVENOUS ONCE
Status: DISCONTINUED | OUTPATIENT
Start: 2019-06-13 | End: 2019-06-14 | Stop reason: HOSPADM

## 2019-06-13 RX ORDER — SODIUM CHLORIDE 9 MG/ML
125 INJECTION, SOLUTION INTRAVENOUS CONTINUOUS
Status: DISCONTINUED | OUTPATIENT
Start: 2019-06-13 | End: 2019-06-14 | Stop reason: HOSPADM

## 2019-06-13 RX ORDER — DIPHENHYDRAMINE HYDROCHLORIDE 50 MG/ML
12.5 INJECTION INTRAMUSCULAR; INTRAVENOUS ONCE AS NEEDED
Status: CANCELLED | OUTPATIENT
Start: 2019-06-13

## 2019-06-13 RX ORDER — HYDROMORPHONE HCL/PF 1 MG/ML
0.5 SYRINGE (ML) INJECTION
Status: CANCELLED | OUTPATIENT
Start: 2019-06-13

## 2019-06-13 RX ORDER — FENTANYL CITRATE/PF 50 MCG/ML
25 SYRINGE (ML) INJECTION
Status: CANCELLED | OUTPATIENT
Start: 2019-06-13

## 2019-06-14 ENCOUNTER — DOCUMENTATION (OUTPATIENT)
Dept: SURGERY | Facility: HOSPITAL | Age: 49
End: 2019-06-14

## 2019-07-02 ENCOUNTER — APPOINTMENT (EMERGENCY)
Dept: RADIOLOGY | Facility: HOSPITAL | Age: 49
End: 2019-07-02
Payer: COMMERCIAL

## 2019-07-02 ENCOUNTER — HOSPITAL ENCOUNTER (EMERGENCY)
Facility: HOSPITAL | Age: 49
Discharge: HOME | End: 2019-07-02
Attending: EMERGENCY MEDICINE | Admitting: EMERGENCY MEDICINE
Payer: COMMERCIAL

## 2019-07-02 ENCOUNTER — APPOINTMENT (EMERGENCY)
Dept: CT IMAGING | Facility: HOSPITAL | Age: 49
End: 2019-07-02
Payer: COMMERCIAL

## 2019-07-02 VITALS
WEIGHT: 210.54 LBS | SYSTOLIC BLOOD PRESSURE: 138 MMHG | BODY MASS INDEX: 30.21 KG/M2 | DIASTOLIC BLOOD PRESSURE: 86 MMHG | OXYGEN SATURATION: 99 % | HEART RATE: 70 BPM | RESPIRATION RATE: 18 BRPM | TEMPERATURE: 95.5 F

## 2019-07-02 DIAGNOSIS — R53.1 WEAKNESS: Primary | ICD-10-CM

## 2019-07-02 DIAGNOSIS — F15.93 WITHDRAWAL FROM OTHER STIMULANT DRUG (HCC): ICD-10-CM

## 2019-07-02 DIAGNOSIS — R53.83 FATIGUE: ICD-10-CM

## 2019-07-02 LAB
ALBUMIN SERPL BCP-MCNC: 4.5 G/DL (ref 3–5.2)
ALP SERPL-CCNC: 72 U/L (ref 43–122)
ALT SERPL W P-5'-P-CCNC: 22 U/L (ref 9–52)
AMPHETAMINES SERPL QL SCN: POSITIVE
ANION GAP SERPL CALCULATED.3IONS-SCNC: 9 MMOL/L (ref 5–14)
AST SERPL W P-5'-P-CCNC: 25 U/L (ref 17–59)
ATRIAL RATE: 77 BPM
BACTERIA UR QL AUTO: ABNORMAL /HPF
BARBITURATES UR QL: NEGATIVE
BASOPHILS # BLD AUTO: 0.1 THOUSANDS/ΜL (ref 0–0.1)
BASOPHILS NFR BLD AUTO: 2 % (ref 0–1)
BENZODIAZ UR QL: NEGATIVE
BILIRUB SERPL-MCNC: 0.5 MG/DL
BILIRUB UR QL STRIP: NEGATIVE
BUN SERPL-MCNC: 23 MG/DL (ref 5–25)
CALCIUM SERPL-MCNC: 9.7 MG/DL (ref 8.4–10.2)
CHLORIDE SERPL-SCNC: 99 MMOL/L (ref 97–108)
CLARITY UR: CLEAR
CO2 SERPL-SCNC: 32 MMOL/L (ref 22–30)
COCAINE UR QL: POSITIVE
COLOR UR: YELLOW
CREAT SERPL-MCNC: 1.49 MG/DL (ref 0.7–1.5)
EOSINOPHIL # BLD AUTO: 0.1 THOUSAND/ΜL (ref 0–0.4)
EOSINOPHIL NFR BLD AUTO: 1 % (ref 0–6)
ERYTHROCYTE [DISTWIDTH] IN BLOOD BY AUTOMATED COUNT: 13.9 %
GFR SERPL CREATININE-BSD FRML MDRD: 62 ML/MIN/1.73SQ M
GLUCOSE SERPL-MCNC: 109 MG/DL (ref 70–99)
GLUCOSE UR STRIP-MCNC: NEGATIVE MG/DL
HCT VFR BLD AUTO: 41.2 % (ref 41–53)
HGB BLD-MCNC: 13.1 G/DL (ref 13.5–17.5)
HGB UR QL STRIP.AUTO: 25
KETONES UR STRIP-MCNC: NEGATIVE MG/DL
LEUKOCYTE ESTERASE UR QL STRIP: 25
LIPASE SERPL-CCNC: 51 U/L (ref 23–300)
LYMPHOCYTES # BLD AUTO: 2.7 THOUSANDS/ΜL (ref 0.5–4)
LYMPHOCYTES NFR BLD AUTO: 40 % (ref 25–45)
MCH RBC QN AUTO: 27.7 PG (ref 26–34)
MCHC RBC AUTO-ENTMCNC: 31.9 G/DL (ref 31–36)
MCV RBC AUTO: 87 FL (ref 80–100)
METHADONE UR QL: NEGATIVE
MONOCYTES # BLD AUTO: 0.5 THOUSAND/ΜL (ref 0.2–0.9)
MONOCYTES NFR BLD AUTO: 7 % (ref 1–10)
NEUTROPHILS # BLD AUTO: 3.5 THOUSANDS/ΜL (ref 1.8–7.8)
NEUTS SEG NFR BLD AUTO: 51 % (ref 45–65)
NITRITE UR QL STRIP: NEGATIVE
NON-SQ EPI CELLS URNS QL MICRO: ABNORMAL /HPF
NT-PROBNP SERPL-MCNC: 23.1 PG/ML (ref 0–299)
OPIATES UR QL SCN: POSITIVE
P AXIS: 57 DEGREES
PCP UR QL: NEGATIVE
PH UR STRIP.AUTO: 5 [PH]
PLATELET # BLD AUTO: 314 THOUSANDS/UL (ref 150–450)
PMV BLD AUTO: 7.7 FL (ref 8.9–12.7)
POTASSIUM SERPL-SCNC: 4.6 MMOL/L (ref 3.6–5)
PR INTERVAL: 162 MS
PROT SERPL-MCNC: 7.9 G/DL (ref 5.9–8.4)
PROT UR STRIP-MCNC: NEGATIVE MG/DL
QRS AXIS: 42 DEGREES
QRSD INTERVAL: 76 MS
QT INTERVAL: 364 MS
QTC INTERVAL: 411 MS
RBC # BLD AUTO: 4.75 MILLION/UL (ref 4.5–5.9)
RBC #/AREA URNS AUTO: ABNORMAL /HPF
SODIUM SERPL-SCNC: 140 MMOL/L (ref 137–147)
SP GR UR STRIP.AUTO: 1.02 (ref 1–1.04)
T WAVE AXIS: 51 DEGREES
THC UR QL: POSITIVE
TROPONIN I SERPL-MCNC: <0.01 NG/ML (ref 0–0.03)
UROBILINOGEN UA: NEGATIVE MG/DL
VENTRICULAR RATE: 77 BPM
WBC # BLD AUTO: 6.8 THOUSAND/UL (ref 4.5–11)
WBC #/AREA URNS AUTO: ABNORMAL /HPF

## 2019-07-02 PROCEDURE — 71046 X-RAY EXAM CHEST 2 VIEWS: CPT

## 2019-07-02 PROCEDURE — 83690 ASSAY OF LIPASE: CPT | Performed by: EMERGENCY MEDICINE

## 2019-07-02 PROCEDURE — 99285 EMERGENCY DEPT VISIT HI MDM: CPT

## 2019-07-02 PROCEDURE — 80307 DRUG TEST PRSMV CHEM ANLYZR: CPT | Performed by: EMERGENCY MEDICINE

## 2019-07-02 PROCEDURE — 96360 HYDRATION IV INFUSION INIT: CPT

## 2019-07-02 PROCEDURE — 81001 URINALYSIS AUTO W/SCOPE: CPT | Performed by: EMERGENCY MEDICINE

## 2019-07-02 PROCEDURE — 36415 COLL VENOUS BLD VENIPUNCTURE: CPT | Performed by: EMERGENCY MEDICINE

## 2019-07-02 PROCEDURE — 93010 ELECTROCARDIOGRAM REPORT: CPT | Performed by: INTERNAL MEDICINE

## 2019-07-02 PROCEDURE — 70450 CT HEAD/BRAIN W/O DYE: CPT

## 2019-07-02 PROCEDURE — 85025 COMPLETE CBC W/AUTO DIFF WBC: CPT | Performed by: EMERGENCY MEDICINE

## 2019-07-02 PROCEDURE — 80053 COMPREHEN METABOLIC PANEL: CPT | Performed by: EMERGENCY MEDICINE

## 2019-07-02 PROCEDURE — 84484 ASSAY OF TROPONIN QUANT: CPT | Performed by: EMERGENCY MEDICINE

## 2019-07-02 PROCEDURE — 83880 ASSAY OF NATRIURETIC PEPTIDE: CPT | Performed by: EMERGENCY MEDICINE

## 2019-07-02 PROCEDURE — 99284 EMERGENCY DEPT VISIT MOD MDM: CPT | Performed by: EMERGENCY MEDICINE

## 2019-07-02 PROCEDURE — 93005 ELECTROCARDIOGRAM TRACING: CPT

## 2019-07-02 RX ADMIN — SODIUM CHLORIDE 1000 ML: 0.9 INJECTION, SOLUTION INTRAVENOUS at 22:16

## 2019-07-04 NOTE — ED PROVIDER NOTES
History  Chief Complaint   Patient presents with    Weakness - Generalized     "everything feels slugish" pt smoking outside when called c/o nausea and vomiting       History provided by:  Patient  Fatigue   Severity:  Moderate  Onset quality:  Gradual  Timing:  Constant  Progression:  Worsening  Chronicity:  New  Relieved by:  Nothing  Worsened by:  Nothing  Ineffective treatments:  None tried  Associated symptoms: anorexia, lethargy, nausea and vomiting    Associated symptoms: no abdominal pain, no chest pain, no cough, no diarrhea, no dizziness, no dysuria, no fever, no headaches, no myalgias, no shortness of breath and no urgency        Prior to Admission Medications   Prescriptions Last Dose Informant Patient Reported? Taking?    ACCU-CHEK FASTCLIX LANCETS MISC   No No   Sig: by Does not apply route daily   Blood Glucose Monitoring Suppl (ACCU-CHEK GUIDE) w/Device KIT   No No   Sig: by Does not apply route daily   albuterol (PROAIR HFA) 90 mcg/act inhaler   No No   Sig: Inhale 2 puffs every 4 (four) hours as needed for wheezing or shortness of breath   atorvastatin (LIPITOR) 10 mg tablet   No No   Sig: Take 1 tablet (10 mg total) by mouth daily   emtricitabine-tenofovir (TRUVADA) 200-300 mg per tablet   No No   Sig: Take 1 tablet by mouth every 24 hours   metFORMIN (GLUCOPHAGE) 1000 MG tablet   No No   Sig: Take 1 tablet (1,000 mg total) by mouth 2 (two) times a day with meals   nicotine (NICODERM CQ) 7 mg/24hr TD 24 hr patch   No No   Sig: Place 1 patch on the skin every 24 hours   rivaroxaban (XARELTO) 20 mg tablet   No No   Sig: Take 1 tablet (20 mg total) by mouth every 24 hours   tamsulosin (FLOMAX) 0 4 mg   No No   Sig: Take 1 capsule (0 4 mg total) by mouth daily with dinner      Facility-Administered Medications: None       Past Medical History:   Diagnosis Date    Anemia     Anxiety     Arthritis     Asthma     Blood clot in vein 2017    right leg    Blood disorder     Chronic pain disorder good control    Depression     Diabetes mellitus ([facility] [address] )     type 2    Enlarged prostate     [facility]     past, last was 1 year ago    Hypertension     Psychiatric disorder     bipolar    Psychiatric illness     Psychosis ([facility] )     Sleep apnea     snores but hasn't been tested    Vertigo        Past Surgical History:   Procedure Laterality Date    CYST REMOVAL      ESOPHAGOGASTRODUODENOSCOPY N/A 11/9/2016    Procedure: ESOPHAGOGASTRODUODENOSCOPY (EGD); Surgeon: Len Katz MD;  Location:  GI LAB; Service:     ESOPHAGOGASTRODUODENOSCOPY N/A 9/2/2016    Procedure: ESOPHAGOGASTRODUODENOSCOPY (EGD); Surgeon: Len Katz MD;  [facility]; Service:     HERNIA REPAIR         Family History   Problem Relation Age of Onset    No Known Problems Mother     Colon cancer Father 61    No Known Problems Sister     No Known Problems Brother     No Known Problems Maternal Aunt     No Known Problems Paternal Aunt     No Known Problems Maternal Uncle     No Known Problems Paternal Uncle     No Known Problems Maternal Grandfather     No Known Problems Maternal Grandmother     No Known Problems Paternal Grandfather     Hypertension Paternal Grandmother     No Known Problems Cousin     Prostate cancer Family     Stroke Family     Diabetes Family         MELLITUS    Coronary artery disease Family      I have reviewed and agree with the history as documented  Social History     Tobacco Use    Smoking status: Current Every Day Smoker     Packs/day: 0 20     Years: 15 00     Pack years: 3 00    Smokeless tobacco: Current User    Tobacco comment: smokes occasionally; LIGHT TOBACCO SMOKER, TOBACCO USE, CIGARETTE, 2 CIGARETTES/DAY AS PER NEXTGEN   Substance Use Topics    Alcohol use:  Yes     Alcohol/week: 12 0 standard drinks     Types: 12 Cans of beer per week     Comment: "bout a case a week"    Drug use: Yes     Types: Marijuana, Cocaine     Comment: today at 0600        Review of Systems   Constitutional: Positive for fatigue  Negative for chills and fever  HENT: Negative for rhinorrhea, sore throat and trouble swallowing  Eyes: Negative for pain  Respiratory: Negative for cough, shortness of breath, wheezing and stridor  Cardiovascular: Negative for chest pain and leg swelling  Gastrointestinal: Positive for anorexia, nausea and vomiting  Negative for abdominal pain and diarrhea  Endocrine: Negative for polyuria  Genitourinary: Negative for dysuria, flank pain and urgency  Musculoskeletal: Negative for joint swelling, myalgias and neck stiffness  Skin: Negative for rash  Allergic/Immunologic: Negative for immunocompromised state  Neurological: Negative for dizziness, syncope, weakness, numbness and headaches  Psychiatric/Behavioral: Negative for confusion and suicidal ideas  All other systems reviewed and are negative  Physical Exam  Physical Exam   Constitutional: He is oriented to person, place, and time  He appears well-developed and well-nourished  HENT:   Head: Normocephalic and atraumatic  Eyes: Pupils are equal, round, and reactive to light  EOM are normal    Neck: Normal range of motion  Neck supple  Cardiovascular: Normal rate and regular rhythm  Exam reveals no friction rub  No murmur heard  Pulmonary/Chest: Breath sounds normal  No respiratory distress  He has no wheezes  He has no rales  Abdominal: Soft  Bowel sounds are normal  He exhibits no distension  There is no tenderness  Musculoskeletal: Normal range of motion  He exhibits no edema or tenderness  Neurological: He is alert and oriented to person, place, and time  Skin: Skin is warm  No rash noted  Psychiatric: He has a normal mood and affect  Nursing note and vitals reviewed        Vital Signs  ED Triage Vitals [07/02/19 2112]   Temperature Pulse Respirations Blood Pressure SpO2   (!) 95 5 °F (35 3 °C) 88 20 128/78 96 %      Temp Source Heart Rate Source Patient Position - Orthostatic VS BP Location FiO2 (%)   Tympanic Monitor Sitting Left arm --      Pain Score       3           Vitals:    07/02/19 2112 07/02/19 2215 07/02/19 2300   BP: 128/78 125/83 138/86   Pulse: 88 77 70   Patient Position - Orthostatic VS: Sitting Lying Lying         Visual Acuity      ED Medications  Medications   sodium chloride 0 9 % bolus 1,000 mL (0 mL Intravenous Stopped 7/2/19 2300)       Diagnostic Studies  Results Reviewed     Procedure Component Value Units Date/Time    Rapid drug screen, urine [[id number]]  (Abnormal) Collected:  07/02/19 2222    Lab Status:  Final result Specimen:  Urine, Clean Catch Updated:  07/02/19 2254     Amph/Meth UR Positive     Barbiturate Ur Negative     Benzodiazepine Urine Negative     Cocaine Urine Positive     Methadone Urine Negative     Opiate Urine Positive     PCP Ur Negative     THC Urine Positive    Narrative:       Presumptive report  If requested, specimen will be sent to reference lab for confirmation  FOR MEDICAL PURPOSES ONLY  IF CONFIRMATION NEEDED PLEASE CONTACT THE LAB WITHIN 5 DAYS      Drug Screen Cutoff Levels:  AMPHETAMINE/METHAMPHETAMINES  1000 ng/mL  BARBITURATES     200 ng/mL  BENZODIAZEPINES     200 ng/mL  COCAINE      300 ng/mL  METHADONE      300 ng/mL  OPIATES      300 ng/mL  PHENCYCLIDINE     25 ng/mL  THC       50 ng/mL      Urine Microscopic [[id number]]  (Abnormal) Collected:  07/02/19 2222    Lab Status:  Final result Specimen:  Urine, Clean Catch Updated:  07/02/19 2235     RBC, UA 2-4 /hpf      WBC, UA 4-10 /hpf      Epithelial Cells Occasional /hpf      Bacteria, UA Occasional /hpf     Troponin I [[id number]]  (Normal) Collected:  07/02/19 2200    Lab Status:  Final result Specimen:  Blood from Arm, Right Updated:  07/02/19 2234     Troponin I <0 01 ng/mL     B-type natriuretic peptide [[id number]]  (Normal) Collected:  07/02/19 2200    Lab Status:  Final result Specimen:  Blood from Line, Venous Updated:  07/02/19 2234 NT-proBNP 23 1 pg/mL     UA w Reflex to Microscopic [[id number]]  (Abnormal) Collected:  07/02/19 2222    Lab Status:  Final result Specimen:  Urine, Clean Catch Updated:  07/02/19 2230     Color, UA Yellow     Clarity, UA Clear     Specific [place], UA 1 020     pH, UA 5 0     Leukocytes, UA 25 0     Nitrite, UA Negative     Protein, UA Negative mg/dl      Glucose, UA Negative mg/dl      Ketones, UA Negative mg/dl      Bilirubin, UA Negative     Blood, UA 25 0     UROBILINOGEN UA Negative mg/dL     Lipase [[id number]]  (Normal) Collected:  07/02/19 2200    Lab Status:  Final result Specimen:  Blood from Line, Venous Updated:  07/02/19 2224     Lipase 51 u/L     Comprehensive metabolic panel [[id number]]  (Abnormal) Collected:  07/02/19 2200    Lab Status:  Final result Specimen:  Blood from Line, Venous Updated:  07/02/19 2224     Sodium 140 mmol/L      Potassium 4 6 mmol/L      Chloride 99 mmol/L      CO2 32 mmol/L      ANION GAP 9 mmol/L      BUN 23 mg/dL      Creatinine 1 49 mg/dL      Glucose 109 mg/dL      Calcium 9 7 mg/dL      AST 25 U/L      ALT 22 U/L      Alkaline Phosphatase 72 U/L      Total Protein 7 9 g/dL      Albumin 4 5 g/dL      Total Bilirubin 0 50 mg/dL      eGFR 62 ml/min/1 73sq m     Narrative:       Ray guidelines for Chronic Kidney Disease (CKD):     Stage 1 with normal or high GFR (GFR > 90 mL/min/1 73 square meters)    Stage 2 Mild CKD (GFR = 60-89 mL/min/1 73 square meters)    Stage 3A Moderate CKD (GFR = 45-59 mL/min/1 73 square meters)    Stage 3B Moderate CKD (GFR = 30-44 mL/min/1 73 square meters)    Stage 4 Severe CKD (GFR = 15-29 mL/min/1 73 square meters)    Stage 5 End Stage CKD (GFR <15 mL/min/1 73 square meters)  Note: GFR calculation is accurate only with a steady state creatinine    CBC and differential [[id number]]  (Abnormal) Collected:  07/02/19 2200    Lab Status:  Final result Specimen:  Blood from Line, Venous Updated:  07/02/19 2215 WBC 6 80 Thousand/uL      RBC 4 75 Million/uL      Hemoglobin 13 1 g/dL      Hematocrit 41 2 %      MCV 87 fL      MCH 27 7 pg      MCHC 31 9 g/dL      RDW 13 9 %      MPV 7 7 fL      Platelets 013 Thousands/uL      Neutrophils Relative 51 %      Lymphocytes Relative 40 %      Monocytes Relative 7 %      Eosinophils Relative 1 %      Basophils Relative 2 %      Neutrophils Absolute 3 50 Thousands/µL      Lymphocytes Absolute 2 70 Thousands/µL      Monocytes Absolute 0 50 Thousand/µL      Eosinophils Absolute 0 10 Thousand/µL      Basophils Absolute 0 10 Thousands/µL                  CT head without contrast   Final Result by Monik Mar DO (07/02 2223)      No acute intracranial abnormality is seen  Workstation performed: [id number]         XR chest 2 views   Final Result by Madi Laureano MD (21/43 1642)      No active pulmonary disease  Workstation performed: [id number]                    Procedures  Procedures       ED Course  ED Course as of Jul 04 1511   Tue Jul 02, 2019   2136  Patient with noted admitting to methamphetamine and cocaine abuse use the last day or 2  Could be cause withdrawal symptoms or contaminants  The patient has recently bought from a new dealer  2222   Patient vomiting red contents heme-negative in the department  Does not appear to be blood  MDM  Number of Diagnoses or Management Options  Fatigue: new and requires workup  Weakness: new and requires workup  Withdrawal from other stimulant drug [facility]): new and requires workup  Diagnosis management comments: This is a 23-year-old male with a history of drug abuse who presents emerged by with noted generalized fatigue and weakness over the last several days duration  The patient states that he stepped use drugs 2 days ago the noted with symptoms of generalized fatigue and possible withdrawal symptoms    This point time the emerged department the patient is afebrile vital signs stable patient did have 1 episode of vomiting in the emergency department heme-negative  Plan outpatient management follow his treatment regimen to return back to the emergency department  Pt re-examined and evaluated after testing and treatment  Spoke with the patient and feeling improved and sxs have resolved  Will discharge home with close f/u with pcp and instructed to return to the ED if sxs worsen or continue  Pt agrees with the plan for discharge and feels comfortable to go home with proper f/u  Advised to return for worsening or additional problems  Diagnostic tests were reviewed and questions answered  Diagnosis, care plan and treatment options were discussed  The patient understand instructions and will follow up as directed  Counseling: I had a detailed discussion with the patient and/or guardian regarding: the historical points, exam findings, and any diagnostic results supporting the discharge diagnosis, lab results, radiology results, discharge instructions reviewed with patient and/or family/caregiver and understanding was verbalized  Instructions given to return to the emergency department if symptoms worsen or persist, or if there are any questions or concerns that arise at home            Amount and/or Complexity of Data Reviewed  Clinical lab tests: ordered and reviewed  Tests in the radiology section of CPT®: ordered and reviewed  Decide to obtain previous medical records or to obtain history from someone other than the patient: yes  Review and summarize past medical records: yes  Independent visualization of images, tracings, or specimens: yes (All labs reviewed and utilized in the medical decision making process    All radiology studies independently viewed by me and interpreted by the radiologist   )        Disposition  Final diagnoses:   Weakness   Fatigue   Withdrawal from other stimulant drug ([facility] [address] )     Time reflects when diagnosis was documented in both MDM as applicable and the Disposition within this note     Time User Action Codes Description Comment    7/2/2019 11:03 PM Gamaliel Mandril Add [R53 1] Weakness     7/2/2019 11:03 PM Gamaliel Mandril Add [R53 83] Fatigue     7/2/2019 11:03 PM Gamaliel Mandril Add [F15 93] Withdrawal from other stimulant drug [facility])       ED Disposition     ED Disposition Condition Date/Time Comment    Discharge Stable Tue Jul 2, 2019 11:03 PM Shelby Cortez discharge to home/self care              Follow-up Information     Follow up With Specialties Details Why Contact Info Additional [address] Medicine Schedule an appointment as soon as possible for a visit  As needed [address], [address] [facility] [address]  [address], [address], [address] [place], [place], [address]          Discharge Medication List as of 7/2/2019 11:03 PM      CONTINUE these medications which have NOT CHANGED    Details   ACCU-CHEK FASTCLIX LANCETS MISC by Does not apply route daily, Starting Wed 11/28/2018, Normal      albuterol (PROAIR HFA) 90 mcg/act inhaler Inhale 2 puffs every 4 (four) hours as needed for wheezing or shortness of breath, Starting Wed 11/28/2018, Normal      atorvastatin (LIPITOR) 10 mg tablet Take 1 tablet (10 mg total) by mouth daily, Starting Fri 5/10/2019, Normal      Blood Glucose Monitoring Suppl (ACCU-CHEK GUIDE) w/Device KIT by Does not apply route daily, Starting Wed 11/28/2018, Normal      emtricitabine-tenofovir (TRUVADA) 200-300 mg per tablet Take 1 tablet by mouth every 24 hours, Starting Fri 3/1/2019, Normal      metFORMIN (GLUCOPHAGE) 1000 MG tablet Take 1 tablet (1,000 mg total) by mouth 2 (two) times a day with meals, Starting Fri 5/10/2019, Normal      nicotine (NICODERM CQ) 7 mg/24hr TD 24 hr patch Place 1 patch on the skin every 24 hours, Starting Fri 3/1/2019, Normal      rivaroxaban (XARELTO) 20 mg tablet Take 1 tablet (20 mg total) by mouth every 24 hours, Starting Fri 3/1/2019, Normal      tamsulosin (FLOMAX) 0 4 mg Take 1 capsule (0 4 mg total) by mouth daily with dinner, Starting Wed 3/13/2019, Normal           No discharge procedures on file      ED Provider  Electronically Signed by           Lisa Islas DO  07/04/19 4106

## 2019-08-28 ENCOUNTER — OFFICE VISIT (OUTPATIENT)
Dept: FAMILY MEDICINE CLINIC | Facility: CLINIC | Age: 49
End: 2019-08-28

## 2019-08-28 VITALS
BODY MASS INDEX: 26.34 KG/M2 | SYSTOLIC BLOOD PRESSURE: 100 MMHG | WEIGHT: 184 LBS | DIASTOLIC BLOOD PRESSURE: 64 MMHG | OXYGEN SATURATION: 98 % | HEART RATE: 90 BPM | RESPIRATION RATE: 18 BRPM | TEMPERATURE: 97 F | HEIGHT: 70 IN

## 2019-08-28 DIAGNOSIS — R21 RASH IN ADULT: Primary | ICD-10-CM

## 2019-08-28 DIAGNOSIS — E11.9 TYPE 2 DIABETES MELLITUS WITHOUT COMPLICATION, WITHOUT LONG-TERM CURRENT USE OF INSULIN (HCC): ICD-10-CM

## 2019-08-28 PROCEDURE — 99214 OFFICE O/P EST MOD 30 MIN: CPT | Performed by: FAMILY MEDICINE

## 2019-08-28 PROCEDURE — 3008F BODY MASS INDEX DOCD: CPT | Performed by: FAMILY MEDICINE

## 2019-08-28 PROCEDURE — 4004F PT TOBACCO SCREEN RCVD TLK: CPT | Performed by: FAMILY MEDICINE

## 2019-08-28 RX ORDER — LORATADINE 10 MG/1
10 TABLET ORAL DAILY
Status: CANCELLED | OUTPATIENT
Start: 2019-08-28

## 2019-08-28 RX ORDER — EPINEPHRINE 0.15 MG/.3ML
0.15 INJECTION INTRAMUSCULAR ONCE
Qty: 0.3 ML | Refills: 0 | Status: CANCELLED | OUTPATIENT
Start: 2019-08-28 | End: 2019-08-28

## 2019-08-28 RX ORDER — RANITIDINE 150 MG/1
150 TABLET ORAL 2 TIMES DAILY
Status: CANCELLED | OUTPATIENT
Start: 2019-08-28

## 2019-08-28 NOTE — TELEPHONE ENCOUNTER
Pt is requesting refill on     atorvastatin (LIPITOR) 10 mg tablet   metFORMIN (GLUCOPHAGE) 1000 MG tablet     Pending appt on 9/19 habib    Correct pharmacy on file

## 2019-08-28 NOTE — PROGRESS NOTES
Assessment/Plan:    Rash in adult  Most likely heat rash  Rash is concentrated at area of back with most clothing contact  Advice as follows: do not repeat clothing, apply cool compresses  May apply hydrocortizone 2 5% cream on affected area  Diagnoses and all orders for this visit:    Rash in adult  -     hydrocortisone 2 5 % ointment; Apply topically 2 (two) times a day    Other orders  -     Cancel: ranitidine (ZANTAC) 150 mg tablet; Take 1 tablet (150 mg total) by mouth 2 (two) times a day  -     Cancel: loratadine (CLARITIN) 10 mg tablet; Take 1 tablet (10 mg total) by mouth daily  -     Cancel: EPINEPHrine (EPIPEN JR) 0 15 mg/0 3 mL SOAJ; Inject 0 3 mL (0 15 mg total) into a muscle once for 1 dose          Subjective:      Patient ID: Neil Pike is a 44 y o  male  Neil Pike is a 44 y o  Here for [facility] f/u and complains of a rash on his back  Patient was admitted at [facility] after he fell and lost consciousness for an average of 15 minutes  He had a full cardiac and neural work up that was negative  UDS positive for coccaine and amphetamines  Patient reports that since discharge, he has not experienced symptoms of HA, Dizziness, visual abnormalities, SOB or chest pain  He reports that he now drinks plenty of water and that his urine is clear to light yellow in color  He also complains of a rash on his back present for about one week  Rash   This is a new problem  The current episode started 1 to 4 weeks ago (one week)  The problem has been gradually worsening since onset  The affected locations include the back  The rash is characterized by itchiness and redness  He was exposed to plant contact  Pertinent negatives include no congestion, cough, diarrhea, eye pain, facial edema, fatigue, fever, joint pain, rhinorrhea, shortness of breath, sore throat or vomiting  Past treatments include nothing  There is no history of allergies, asthma, eczema or varicella         The following portions of the patient's history were reviewed and updated as appropriate:   He  has a past medical history of Anemia, Anxiety, Arthritis, Asthma, Blood clot in vein (2017), Blood disorder, Chronic pain disorder, Depression, Diabetes mellitus ([facility] ), Enlarged prostate, Epilepsy ([facility] ), Hypertension, Psychiatric disorder, Psychiatric illness, Psychosis ([facility] ), Sleep apnea, and Vertigo  He   Patient Active Problem List    Diagnosis Date Noted    Rash in adult 08/28/2019    Pre-op examination 05/10/2019    Other chest pain 05/10/2019    Post-traumatic bulbous urethral stricture 04/22/2019    Spermatocele of epididymis 03/14/2019    Encounter for wellness examination 03/01/2019    Tobacco dependence 03/01/2019    Tobacco abuse counseling 03/01/2019    Testicle lump 03/01/2019    Other male erectile dysfunction 03/01/2019    Near syncope 11/28/2018    Deep venous thrombosis ([place] [id number] ) 11/28/2018    HIV exposure 11/28/2018    Mild intermittent asthma without complication 52/22/5304    Protein S deficiency ([place] [address] ) 11/28/2018    Positive RPR test 09/06/2016    Anemia 09/03/2016    Peptic ulcer disease 09/03/2016    Schizoaffective disorder, bipolar type ([facility] ) 09/01/2016    Generalized anxiety disorder 09/01/2016    Cannabis abuse, episodic 09/01/2016    Dyslexia, developmental 09/01/2016    Benign prostatic hyperplasia with urinary frequency 09/01/2016    Type 2 diabetes mellitus without complication, without long-term current use of insulin ([facility] ) 09/01/2016    Epilepsy ([place] [id number] ) 09/01/2016     He  has a past surgical history that includes Cyst Removal; Hernia repair; Esophagogastroduodenoscopy (N/A, 11/9/2016); and Esophagogastroduodenoscopy (N/A, 9/2/2016)  His family history includes Colon cancer (age of onset: 61) in his father; Coronary artery disease in his family; Diabetes in his family;  Hypertension in his paternal grandmother; No Known Problems in his brother, cousin, maternal aunt, maternal grandfather, maternal grandmother, maternal uncle, mother, paternal aunt, paternal grandfather, paternal uncle, and sister; Prostate cancer in his family; Stroke in his family  He  reports that he has been smoking  He has a 3 00 pack-year smoking history  He uses smokeless tobacco  He reports that he drinks about 12 0 standard drinks of alcohol per week  He reports that he has current or past drug history  Drugs: Marijuana and Cocaine  Current Outpatient Medications   Medication Sig Dispense Refill    ACCU-CHEK FASTCLIX LANCETS MISC by Does not apply route daily 100 each 5    albuterol (PROAIR HFA) 90 mcg/act inhaler Inhale 2 puffs every 4 (four) hours as needed for wheezing or shortness of breath 1 Inhaler 0    atorvastatin (LIPITOR) 10 mg tablet Take 1 tablet (10 mg total) by mouth daily 30 tablet 2    Blood Glucose Monitoring Suppl (ACCU-CHEK GUIDE) w/Device KIT by Does not apply route daily 1 kit 0    emtricitabine-tenofovir (TRUVADA) 200-300 mg per tablet Take 1 tablet by mouth every 24 hours 30 tablet 5    hydrocortisone 2 5 % ointment Apply topically 2 (two) times a day 30 g 0    metFORMIN (GLUCOPHAGE) 1000 MG tablet Take 1 tablet (1,000 mg total) by mouth 2 (two) times a day with meals 60 tablet 2    nicotine (NICODERM CQ) 7 mg/24hr TD 24 hr patch Place 1 patch on the skin every 24 hours 28 patch 3    rivaroxaban (XARELTO) 20 mg tablet Take 1 tablet (20 mg total) by mouth every 24 hours 30 tablet 5    tamsulosin (FLOMAX) 0 4 mg Take 1 capsule (0 4 mg total) by mouth daily with dinner 30 capsule 1     No current facility-administered medications for this visit        Current Outpatient Medications on File Prior to Visit   Medication Sig    ACCU-CHEK FASTCLIX LANCETS MISC by Does not apply route daily    albuterol (PROAIR HFA) 90 mcg/act inhaler Inhale 2 puffs every 4 (four) hours as needed for wheezing or shortness of breath    atorvastatin (LIPITOR) 10 mg tablet Take 1 tablet (10 mg total) by mouth daily    Blood Glucose Monitoring Suppl (ACCU-CHEK GUIDE) w/Device KIT by Does not apply route daily    emtricitabine-tenofovir (TRUVADA) 200-300 mg per tablet Take 1 tablet by mouth every 24 hours    metFORMIN (GLUCOPHAGE) 1000 MG tablet Take 1 tablet (1,000 mg total) by mouth 2 (two) times a day with meals    nicotine (NICODERM CQ) 7 mg/24hr TD 24 hr patch Place 1 patch on the skin every 24 hours    rivaroxaban (XARELTO) 20 mg tablet Take 1 tablet (20 mg total) by mouth every 24 hours    tamsulosin (FLOMAX) 0 4 mg Take 1 capsule (0 4 mg total) by mouth daily with dinner     No current facility-administered medications on file prior to visit  He is allergic to fish-derived products; nuts; pollen extract; warfarin; and watermelon [citrullus vulgaris]       Review of Systems   Constitutional: Negative for fatigue and fever  HENT: Negative for congestion, rhinorrhea and sore throat  Eyes: Negative for pain  Respiratory: Negative for cough and shortness of breath  Gastrointestinal: Negative for diarrhea and vomiting  Musculoskeletal: Negative for joint pain  Skin: Positive for rash  Neurological: Negative  Hematological: Negative  Psychiatric/Behavioral: Negative  Objective:      /64 (BP Location: Left arm, Patient Position: Sitting, Cuff Size: Standard)   Pulse 90   Temp (!) 97 °F (36 1 °C) (Temporal)   Resp 18   Ht 5' 10" (1 778 m)   Wt 83 5 kg (184 lb)   SpO2 98%   BMI 26 40 kg/m²          Physical Exam   Constitutional: He is oriented to person, place, and time  He appears well-developed and well-nourished  No distress  HENT:   Head: Normocephalic and atraumatic  Nose: Nose normal    Mouth/Throat: Oropharynx is clear and moist  No oropharyngeal exudate  Eyes: Pupils are equal, round, and reactive to light  Conjunctivae and EOM are normal  Right eye exhibits no discharge  Left eye exhibits no discharge  No scleral icterus  Neck: Neck supple   No tracheal deviation present  No thyromegaly present  Cardiovascular: Normal rate, regular rhythm, normal heart sounds and intact distal pulses  Exam reveals no gallop and no friction rub  No murmur heard  Pulmonary/Chest: Effort normal and breath sounds normal  No stridor  No respiratory distress  He has no wheezes  He has no rales  He exhibits no tenderness  Abdominal: Soft  Bowel sounds are normal  He exhibits no distension and no mass  There is no tenderness  There is no rebound and no guarding  No hernia  Musculoskeletal: Normal range of motion  He exhibits no edema, tenderness or deformity  Lymphadenopathy:     He has no cervical adenopathy  Neurological: He is alert and oriented to person, place, and time  He has normal reflexes  No cranial nerve deficit  Skin: Skin is warm and dry  Rash noted  Rash is papular  He is not diaphoretic  No erythema  No pallor  Psychiatric: He has a normal mood and affect   His behavior is normal  Judgment and thought content normal

## 2019-08-28 NOTE — ASSESSMENT & PLAN NOTE
Most likely heat rash  Rash is concentrated at area of back with most clothing contact  Advice as follows: do not repeat clothing, apply cool compresses  May apply hydrocortizone 2 5% cream on affected area

## 2019-08-29 RX ORDER — ATORVASTATIN CALCIUM 10 MG/1
10 TABLET, FILM COATED ORAL DAILY
Qty: 30 TABLET | Refills: 2 | Status: SHIPPED | OUTPATIENT
Start: 2019-08-29 | End: 2019-12-12 | Stop reason: SDUPTHER

## 2019-09-12 ENCOUNTER — TELEPHONE (OUTPATIENT)
Dept: FAMILY MEDICINE CLINIC | Facility: CLINIC | Age: 49
End: 2019-09-12

## 2019-09-12 NOTE — TELEPHONE ENCOUNTER
I reviewed Jessica's last note as this was the last provider that saw him  This was not an allergic reaction both ranitidine and epi pen were canceled as this was just heat rash based on the last note

## 2019-11-12 DIAGNOSIS — J45.20 MILD INTERMITTENT ASTHMA WITHOUT COMPLICATION: ICD-10-CM

## 2019-11-12 NOTE — TELEPHONE ENCOUNTER
Has not been seen in about 6 months, refill denied  Will need an appointment for re-evaluation       Sent to clerical 11/12/19 @ 1:49 PM

## 2019-11-13 NOTE — TELEPHONE ENCOUNTER
Patient has been schedule for next available appt with is 12/12/19  Appt reminder has been mailed to the patient

## 2019-12-12 ENCOUNTER — OFFICE VISIT (OUTPATIENT)
Dept: FAMILY MEDICINE CLINIC | Facility: CLINIC | Age: 49
End: 2019-12-12

## 2019-12-12 VITALS
BODY MASS INDEX: 28.12 KG/M2 | HEART RATE: 83 BPM | SYSTOLIC BLOOD PRESSURE: 102 MMHG | OXYGEN SATURATION: 98 % | WEIGHT: 196 LBS | TEMPERATURE: 97.6 F | DIASTOLIC BLOOD PRESSURE: 74 MMHG | RESPIRATION RATE: 17 BRPM

## 2019-12-12 DIAGNOSIS — J45.20 MILD INTERMITTENT ASTHMA WITHOUT COMPLICATION: ICD-10-CM

## 2019-12-12 DIAGNOSIS — E11.9 TYPE 2 DIABETES MELLITUS WITHOUT COMPLICATION, WITHOUT LONG-TERM CURRENT USE OF INSULIN (HCC): Primary | ICD-10-CM

## 2019-12-12 DIAGNOSIS — N40.1 BENIGN PROSTATIC HYPERPLASIA WITH URINARY FREQUENCY: ICD-10-CM

## 2019-12-12 DIAGNOSIS — D68.59 PROTEIN S DEFICIENCY (HCC): ICD-10-CM

## 2019-12-12 DIAGNOSIS — R35.0 BENIGN PROSTATIC HYPERPLASIA WITH URINARY FREQUENCY: ICD-10-CM

## 2019-12-12 DIAGNOSIS — Z20.6 HIV EXPOSURE: ICD-10-CM

## 2019-12-12 LAB — SL AMB POCT HEMOGLOBIN AIC: 6.9 (ref ?–6.5)

## 2019-12-12 PROCEDURE — 99213 OFFICE O/P EST LOW 20 MIN: CPT | Performed by: FAMILY MEDICINE

## 2019-12-12 PROCEDURE — 4004F PT TOBACCO SCREEN RCVD TLK: CPT | Performed by: FAMILY MEDICINE

## 2019-12-12 PROCEDURE — 3044F HG A1C LEVEL LT 7.0%: CPT | Performed by: FAMILY MEDICINE

## 2019-12-12 PROCEDURE — 83036 HEMOGLOBIN GLYCOSYLATED A1C: CPT | Performed by: FAMILY MEDICINE

## 2019-12-12 RX ORDER — EMTRICITABINE AND TENOFOVIR DISOPROXIL FUMARATE 200; 300 MG/1; MG/1
1 TABLET, FILM COATED ORAL EVERY 24 HOURS
Qty: 30 TABLET | Refills: 5 | Status: SHIPPED | OUTPATIENT
Start: 2019-12-12 | End: 2020-10-14 | Stop reason: SDUPTHER

## 2019-12-12 RX ORDER — LORATADINE 10 MG/1
10 TABLET ORAL DAILY
Qty: 30 TABLET | Refills: 5 | Status: SHIPPED | OUTPATIENT
Start: 2019-12-12 | End: 2020-05-04

## 2019-12-12 RX ORDER — ALBUTEROL SULFATE 90 UG/1
2 AEROSOL, METERED RESPIRATORY (INHALATION) EVERY 4 HOURS PRN
Qty: 1 INHALER | Refills: 3 | Status: SHIPPED | OUTPATIENT
Start: 2019-12-12 | End: 2020-05-12 | Stop reason: SDUPTHER

## 2019-12-12 RX ORDER — TAMSULOSIN HYDROCHLORIDE 0.4 MG/1
0.4 CAPSULE ORAL
Qty: 30 CAPSULE | Refills: 3 | Status: SHIPPED | OUTPATIENT
Start: 2019-12-12 | End: 2020-05-04

## 2019-12-12 RX ORDER — ATORVASTATIN CALCIUM 10 MG/1
10 TABLET, FILM COATED ORAL DAILY
Qty: 30 TABLET | Refills: 3 | Status: SHIPPED | OUTPATIENT
Start: 2019-12-12 | End: 2020-10-14 | Stop reason: SDUPTHER

## 2019-12-12 NOTE — PROGRESS NOTES
Assessment/Plan:    Type 2 diabetes mellitus without complication, without long-term current use of insulin ([facility])    Lab Results   Component Value Date    HGBA1C 6 9 (A) 12/12/2019     Slight increase in hemoglobin A1c from 6 5-6 9%  For now given the holiday season, will continue with metformin 1000 mg twice daily  Did advised patient to watch has sugar intake as well as his diet during this time  Blood pressure well controlled  Continue with statin but no aspirin as patient is currently on Xarelto  Advised the patient about the importance of yearly eye examination    Mild intermittent asthma without complication  Has slightly worsened recently with the current change in weather as well as patient has moved to a new apartment  Will continue to hold off on advancing his inhalers in Will continue with albuterol 1-2 times daily as needed that the patient has noted significant relief  Will start the patient on a trial of Claritin once daily  Did advised the patient there is worsening of his symptoms or shortness of breath or increase usage of albuterol to report back to our office or to the emergency department      Return in about 4 months (around 4/12/2020) for Recheck  Diagnoses and all orders for this visit:    Type 2 diabetes mellitus without complication, without long-term current use of insulin ([facility])  -     POCT hemoglobin A1c  -     metFORMIN (GLUCOPHAGE) 1000 MG tablet; Take 1 tablet (1,000 mg total) by mouth 2 (two) times a day with meals  -     atorvastatin (LIPITOR) 10 mg tablet; Take 1 tablet (10 mg total) by mouth daily    Mild intermittent asthma without complication  -     loratadine (CLARITIN) 10 mg tablet; Take 1 tablet (10 mg total) by mouth daily  -     albuterol (PROAIR HFA) 90 mcg/act inhaler; Inhale 2 puffs every 4 (four) hours as needed for wheezing or shortness of breath    Protein S deficiency ([facility])  -     rivaroxaban (XARELTO) 20 mg tablet;  Take 1 tablet (20 mg total) by mouth every 24 hours    Benign prostatic hyperplasia with urinary frequency  -     tamsulosin (FLOMAX) 0 4 mg; Take 1 capsule (0 4 mg total) by mouth daily with dinner    HIV exposure  -     emtricitabine-tenofovir (TRUVADA) 200-300 mg per tablet; Take 1 tablet by mouth every 24 hours        Subjective:     Real Hickman is a 44 y o  male who  has a past medical history of Anemia, Anxiety, Arthritis, Asthma, Blood clot in vein, Blood disorder, Chronic pain disorder, Depression, Diabetes mellitus ([facility] ), Enlarged prostate, Epilepsy ([facility] ), Hypertension, Psychiatric disorder, Psychiatric illness, Psychosis ([facility] ), Sleep apnea, and Vertigo  who presented to the office today for asthma and diabetes re-evaluation  HPI  Patient mentioned that:  Asthma   He complains of chest tightness, difficulty breathing, shortness of breath and wheezing  There is no cough or sputum production  This is a chronic problem  The problem occurs daily  The problem has been unchanged  Associated symptoms include chest pain and dyspnea on exertion  Pertinent negatives include no appetite change, ear congestion, fever, malaise/fatigue, myalgias, nasal congestion, postnasal drip, rhinorrhea or trouble swallowing  His symptoms are aggravated by exercise and change in weather (New apartment )  His symptoms are alleviated by beta-agonist  He reports significant improvement on treatment  Risk factors for lung disease include smoking/tobacco exposure  His past medical history is significant for asthma          The following portions of the patient's history were reviewed and updated as appropriate: allergies, current medications, past family history, past medical history, past social history, past surgical history and problem list       Current Outpatient Medications on File Prior to Visit   Medication Sig Dispense Refill    ACCU-CHEK FASTCLIX LANCETS MISC by Does not apply route daily 100 each 5    ACCU-CHEK GUIDE test strip       Blood Glucose Monitoring Suppl (ACCU-CHEK GUIDE) w/Device KIT by Does not apply route daily 1 kit 0    hydrocortisone 2 5 % ointment Apply topically 2 (two) times a day 30 g 0    [DISCONTINUED] albuterol (PROAIR HFA) 90 mcg/act inhaler Inhale 2 puffs every 4 (four) hours as needed for wheezing or shortness of breath 1 Inhaler 0    [DISCONTINUED] atorvastatin (LIPITOR) 10 mg tablet Take 1 tablet (10 mg total) by mouth daily 30 tablet 2    [DISCONTINUED] emtricitabine-tenofovir (TRUVADA) 200-300 mg per tablet Take 1 tablet by mouth every 24 hours 30 tablet 5    [DISCONTINUED] metFORMIN (GLUCOPHAGE) 1000 MG tablet Take 1 tablet (1,000 mg total) by mouth 2 (two) times a day with meals 60 tablet 2    [DISCONTINUED] rivaroxaban (XARELTO) 20 mg tablet Take 1 tablet (20 mg total) by mouth every 24 hours 30 tablet 5    nicotine (NICODERM CQ) 7 mg/24hr TD 24 hr patch Place 1 patch on the skin every 24 hours 28 patch 3    [DISCONTINUED] tamsulosin (FLOMAX) 0 4 mg Take 1 capsule (0 4 mg total) by mouth daily with dinner 30 capsule 1     No current facility-administered medications on file prior to visit  Review of Systems   Constitutional: Negative for appetite change, fever and malaise/fatigue  HENT: Negative for congestion, postnasal drip, rhinorrhea, sinus pressure, sinus pain and trouble swallowing  Eyes: Negative for visual disturbance  Respiratory: Positive for shortness of breath and wheezing  Negative for cough, sputum production, choking, chest tightness and stridor  Cardiovascular: Positive for chest pain and dyspnea on exertion  Gastrointestinal: Negative for abdominal pain, diarrhea, nausea and vomiting  Endocrine: Negative for polydipsia and polyuria  Musculoskeletal: Negative for myalgias  Skin: Negative for rash  Neurological: Negative for weakness  Hematological: Negative for adenopathy         Objective:    /74   Pulse 83   Temp 97 6 °F (36 4 °C) (Temporal)   Resp 17   Wt 88 9 kg (196 lb) SpO2 98%   BMI 28 12 kg/m²       Physical Exam   Constitutional: He is oriented to person, place, and time  He appears well-developed and well-nourished  No distress  HENT:   Head: Normocephalic and atraumatic  Nose: Nose normal    Eyes: Pupils are equal, round, and reactive to light  Conjunctivae are normal    Neck: Normal range of motion  Neck supple  Cardiovascular: Normal rate, regular rhythm and normal heart sounds  Exam reveals no gallop and no friction rub  No murmur heard  Pulmonary/Chest: Effort normal and breath sounds normal  No respiratory distress  He has no wheezes  He has no rales  Abdominal: Soft  Bowel sounds are normal  He exhibits no distension  There is no tenderness  Musculoskeletal: Normal range of motion  He exhibits no edema  Neurological: He is alert and oriented to person, place, and time  Skin: Skin is warm and dry  No rash noted  He is not diaphoretic  No erythema         Tammy Barry MD  12/12/19  11:07 AM

## 2019-12-12 NOTE — ASSESSMENT & PLAN NOTE
Lab Results   Component Value Date    HGBA1C 6 9 (A) 12/12/2019     Slight increase in hemoglobin A1c from 6 5-6 9%  For now given the holiday season, will continue with metformin 1000 mg twice daily  Did advised patient to watch has sugar intake as well as his diet during this time  Blood pressure well controlled  Continue with statin but no aspirin as patient is currently on Xarelto  Advised the patient about the importance of yearly eye examination

## 2019-12-12 NOTE — ASSESSMENT & PLAN NOTE
Has slightly worsened recently with the current change in weather as well as patient has moved to a new apartment  Will continue to hold off on advancing his inhalers in Will continue with albuterol 1-2 times daily as needed that the patient has noted significant relief  Will start the patient on a trial of Claritin once daily  Did advised the patient there is worsening of his symptoms or shortness of breath or increase usage of albuterol to report back to our office or to the emergency department

## 2020-02-16 ENCOUNTER — APPOINTMENT (EMERGENCY)
Dept: CT IMAGING | Facility: HOSPITAL | Age: 50
End: 2020-02-16
Payer: COMMERCIAL

## 2020-02-16 ENCOUNTER — HOSPITAL ENCOUNTER (EMERGENCY)
Facility: HOSPITAL | Age: 50
Discharge: HOME | End: 2020-02-16
Attending: EMERGENCY MEDICINE | Admitting: EMERGENCY MEDICINE
Payer: COMMERCIAL

## 2020-02-16 VITALS
OXYGEN SATURATION: 98 % | BODY MASS INDEX: 27.98 KG/M2 | DIASTOLIC BLOOD PRESSURE: 90 MMHG | RESPIRATION RATE: 16 BRPM | TEMPERATURE: 96.7 F | WEIGHT: 195 LBS | SYSTOLIC BLOOD PRESSURE: 162 MMHG | HEART RATE: 81 BPM

## 2020-02-16 DIAGNOSIS — J45.901 ASTHMA EXACERBATION: Primary | ICD-10-CM

## 2020-02-16 DIAGNOSIS — R06.02 SOB (SHORTNESS OF BREATH): ICD-10-CM

## 2020-02-16 LAB
ALBUMIN SERPL BCP-MCNC: 3.2 G/DL (ref 3–5.2)
ALP SERPL-CCNC: 47 U/L (ref 43–122)
ALT SERPL W P-5'-P-CCNC: 23 U/L (ref 9–52)
ANION GAP SERPL CALCULATED.3IONS-SCNC: 3 MMOL/L (ref 5–14)
APTT PPP: 27 SECONDS (ref 25–32)
AST SERPL W P-5'-P-CCNC: 21 U/L (ref 17–59)
ATRIAL RATE: 73 BPM
BACTERIA UR QL AUTO: ABNORMAL /HPF
BASOPHILS # BLD AUTO: 0 THOUSANDS/ΜL (ref 0–0.1)
BASOPHILS NFR BLD AUTO: 0 % (ref 0–1)
BILIRUB SERPL-MCNC: 0.3 MG/DL
BILIRUB UR QL STRIP: NEGATIVE
BUN SERPL-MCNC: 12 MG/DL (ref 5–25)
CALCIUM SERPL-MCNC: 8.8 MG/DL (ref 8.4–10.2)
CHLORIDE SERPL-SCNC: 106 MMOL/L (ref 97–108)
CLARITY UR: CLEAR
CO2 SERPL-SCNC: 27 MMOL/L (ref 22–30)
COLOR UR: ABNORMAL
CREAT SERPL-MCNC: 0.99 MG/DL (ref 0.7–1.5)
D DIMER PPP FEU-MCNC: <0.19 UG/ML FEU
EOSINOPHIL # BLD AUTO: 0.1 THOUSAND/ΜL (ref 0–0.4)
EOSINOPHIL NFR BLD AUTO: 2 % (ref 0–6)
ERYTHROCYTE [DISTWIDTH] IN BLOOD BY AUTOMATED COUNT: 14.3 %
FLUAV RNA NPH QL NAA+PROBE: NORMAL
FLUBV RNA NPH QL NAA+PROBE: NORMAL
GFR SERPL CREATININE-BSD FRML MDRD: 102 ML/MIN/1.73SQ M
GLUCOSE SERPL-MCNC: 130 MG/DL (ref 70–99)
GLUCOSE UR STRIP-MCNC: NEGATIVE MG/DL
HCT VFR BLD AUTO: 41 % (ref 41–53)
HGB BLD-MCNC: 13.2 G/DL (ref 13.5–17.5)
HGB UR QL STRIP.AUTO: NEGATIVE
INR PPP: 0.96 (ref 0.91–1.09)
KETONES UR STRIP-MCNC: NEGATIVE MG/DL
LEUKOCYTE ESTERASE UR QL STRIP: 25
LYMPHOCYTES # BLD AUTO: 2.3 THOUSANDS/ΜL (ref 0.5–4)
LYMPHOCYTES NFR BLD AUTO: 45 % (ref 25–45)
MAGNESIUM SERPL-MCNC: 1.7 MG/DL (ref 1.6–2.3)
MCH RBC QN AUTO: 27.6 PG (ref 26–34)
MCHC RBC AUTO-ENTMCNC: 32.1 G/DL (ref 31–36)
MCV RBC AUTO: 86 FL (ref 80–100)
MONOCYTES # BLD AUTO: 0.4 THOUSAND/ΜL (ref 0.2–0.9)
MONOCYTES NFR BLD AUTO: 8 % (ref 1–10)
NEUTROPHILS # BLD AUTO: 2.3 THOUSANDS/ΜL (ref 1.8–7.8)
NEUTS SEG NFR BLD AUTO: 45 % (ref 45–65)
NITRITE UR QL STRIP: NEGATIVE
NON-SQ EPI CELLS URNS QL MICRO: ABNORMAL /HPF
NT-PROBNP SERPL-MCNC: 79.5 PG/ML (ref 0–299)
P AXIS: 57 DEGREES
PH UR STRIP.AUTO: 7 [PH]
PLATELET # BLD AUTO: 266 THOUSANDS/UL (ref 150–450)
PMV BLD AUTO: 7.8 FL (ref 8.9–12.7)
POTASSIUM SERPL-SCNC: 3.9 MMOL/L (ref 3.6–5)
PR INTERVAL: 156 MS
PROT SERPL-MCNC: 5.8 G/DL (ref 5.9–8.4)
PROT UR STRIP-MCNC: NEGATIVE MG/DL
PROTHROMBIN TIME: 10.6 SECONDS (ref 9.8–12)
QRS AXIS: 48 DEGREES
QRSD INTERVAL: 72 MS
QT INTERVAL: 368 MS
QTC INTERVAL: 405 MS
RBC # BLD AUTO: 4.78 MILLION/UL (ref 4.5–5.9)
RBC #/AREA URNS AUTO: ABNORMAL /HPF
RSV RNA NPH QL NAA+PROBE: NORMAL
SODIUM SERPL-SCNC: 136 MMOL/L (ref 137–147)
SP GR UR STRIP.AUTO: 1.01 (ref 1–1.04)
T WAVE AXIS: 49 DEGREES
TROPONIN I SERPL-MCNC: <0.01 NG/ML (ref 0–0.03)
UROBILINOGEN UA: NEGATIVE MG/DL
VENTRICULAR RATE: 73 BPM
WBC # BLD AUTO: 5.1 THOUSAND/UL (ref 4.5–11)
WBC #/AREA URNS AUTO: ABNORMAL /HPF

## 2020-02-16 PROCEDURE — 85025 COMPLETE CBC W/AUTO DIFF WBC: CPT | Performed by: EMERGENCY MEDICINE

## 2020-02-16 PROCEDURE — 80053 COMPREHEN METABOLIC PANEL: CPT | Performed by: EMERGENCY MEDICINE

## 2020-02-16 PROCEDURE — 83880 ASSAY OF NATRIURETIC PEPTIDE: CPT | Performed by: EMERGENCY MEDICINE

## 2020-02-16 PROCEDURE — 83735 ASSAY OF MAGNESIUM: CPT | Performed by: EMERGENCY MEDICINE

## 2020-02-16 PROCEDURE — 93010 ELECTROCARDIOGRAM REPORT: CPT | Performed by: INTERNAL MEDICINE

## 2020-02-16 PROCEDURE — 87631 RESP VIRUS 3-5 TARGETS: CPT | Performed by: EMERGENCY MEDICINE

## 2020-02-16 PROCEDURE — 99285 EMERGENCY DEPT VISIT HI MDM: CPT

## 2020-02-16 PROCEDURE — 81001 URINALYSIS AUTO W/SCOPE: CPT | Performed by: EMERGENCY MEDICINE

## 2020-02-16 PROCEDURE — 85730 THROMBOPLASTIN TIME PARTIAL: CPT | Performed by: EMERGENCY MEDICINE

## 2020-02-16 PROCEDURE — 85610 PROTHROMBIN TIME: CPT | Performed by: EMERGENCY MEDICINE

## 2020-02-16 PROCEDURE — 85379 FIBRIN DEGRADATION QUANT: CPT | Performed by: EMERGENCY MEDICINE

## 2020-02-16 PROCEDURE — 93005 ELECTROCARDIOGRAM TRACING: CPT

## 2020-02-16 PROCEDURE — 36415 COLL VENOUS BLD VENIPUNCTURE: CPT | Performed by: EMERGENCY MEDICINE

## 2020-02-16 PROCEDURE — 84484 ASSAY OF TROPONIN QUANT: CPT | Performed by: EMERGENCY MEDICINE

## 2020-02-16 PROCEDURE — 71250 CT THORAX DX C-: CPT

## 2020-02-16 PROCEDURE — 94640 AIRWAY INHALATION TREATMENT: CPT

## 2020-02-16 PROCEDURE — 99284 EMERGENCY DEPT VISIT MOD MDM: CPT | Performed by: EMERGENCY MEDICINE

## 2020-02-16 RX ORDER — PREDNISONE 20 MG/1
60 TABLET ORAL DAILY
Qty: 12 TABLET | Refills: 0 | Status: SHIPPED | OUTPATIENT
Start: 2020-02-16 | End: 2020-02-20

## 2020-02-16 RX ORDER — IPRATROPIUM BROMIDE AND ALBUTEROL SULFATE 2.5; .5 MG/3ML; MG/3ML
3 SOLUTION RESPIRATORY (INHALATION) ONCE
Status: COMPLETED | OUTPATIENT
Start: 2020-02-16 | End: 2020-02-16

## 2020-02-16 RX ORDER — PREDNISONE 20 MG/1
60 TABLET ORAL ONCE
Status: COMPLETED | OUTPATIENT
Start: 2020-02-16 | End: 2020-02-16

## 2020-02-16 RX ORDER — IPRATROPIUM BROMIDE AND ALBUTEROL SULFATE 2.5; .5 MG/3ML; MG/3ML
3 SOLUTION RESPIRATORY (INHALATION) 4 TIMES DAILY PRN
Qty: 3 VIAL | Refills: 0 | Status: SHIPPED | OUTPATIENT
Start: 2020-02-16 | End: 2020-02-20

## 2020-02-16 RX ADMIN — IPRATROPIUM BROMIDE AND ALBUTEROL SULFATE 3 ML: 2.5; .5 SOLUTION RESPIRATORY (INHALATION) at 16:00

## 2020-02-16 RX ADMIN — PREDNISONE 60 MG: 20 TABLET ORAL at 17:32

## 2020-02-16 NOTE — ED PROVIDER NOTES
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, asthma, HIV exposure on prophylaxis coming in today with shortness of breath  Patient states he has never been intubated before  He has no recent travel, sick contacts, fevers or recent surgeries  He states that it feels like he is drowning  He has no lower extremity edema  He does complain of chest discomfort described as pressure  It does not radiate to jaw, neck, upper extremities or back  He has no nausea, vomiting or diarrhea  Patient does state that he tried using inhaler without relief  He does continue to smoke        History provided by:  Patient and spouse   used: No    Shortness of Breath   Severity:  Moderate  Onset quality:  Gradual  Duration:  1 day  Timing:  Constant  Progression:  Worsening  Chronicity:  New  Context: not activity, not animal exposure, not emotional upset, not fumes, not known allergens, not occupational exposure, not pollens, not smoke exposure, not strong odors, not URI and not weather changes    Relieved by:  Nothing  Worsened by:  Emotional stress  Ineffective treatments:  Inhaler  Associated symptoms: chest pain    Associated symptoms: no abdominal pain, no claudication, no cough, no diaphoresis, no ear pain, no fever, no headaches, no hemoptysis, no neck pain, no PND, no rash, no sore throat, no sputum production, no syncope, no swollen glands, no vomiting and no wheezing    Risk factors: hx of PE/DVT and tobacco use    Risk factors: no recent alcohol use, no family hx of DVT, no hx of cancer, no obesity, no oral contraceptive use, no prolonged immobilization and no recent surgery        Prior to Admission Medications   Prescriptions Last Dose Informant Patient Reported? Taking?    ACCU-CHEK FASTCLIX LANCETS MISC   No No   Sig: by Does not apply route daily   ACCU-CHEK GUIDE test strip   Yes No   Blood Glucose Monitoring Suppl (ACCU-CHEK GUIDE) w/Device KIT   No No   Sig: by Does not apply route daily   albuterol (PROAIR HFA) 90 mcg/act inhaler   No No   Sig: Inhale 2 puffs every 4 (four) hours as needed for wheezing or shortness of breath   atorvastatin (LIPITOR) 10 mg tablet   No No   Sig: Take 1 tablet (10 mg total) by mouth daily   emtricitabine-tenofovir (TRUVADA) 200-300 mg per tablet   No No   Sig: Take 1 tablet by mouth every 24 hours   hydrocortisone 2 5 % ointment   No No   Sig: Apply topically 2 (two) times a day   loratadine (CLARITIN) 10 mg tablet   No No   Sig: Take 1 tablet (10 mg total) by mouth daily   metFORMIN (GLUCOPHAGE) 1000 MG tablet   No No   Sig: Take 1 tablet (1,000 mg total) by mouth 2 (two) times a day with meals   nicotine (NICODERM CQ) 7 mg/24hr TD 24 hr patch   No No   Sig: Place 1 patch on the skin every 24 hours   rivaroxaban (XARELTO) 20 mg tablet   No No   Sig: Take 1 tablet (20 mg total) by mouth every 24 hours   tamsulosin (FLOMAX) 0 4 mg   No No   Sig: Take 1 capsule (0 4 mg total) by mouth daily with dinner      Facility-Administered Medications: None       Past Medical History:   Diagnosis Date    Anemia     Anxiety     Arthritis     Asthma     Blood clot in vein 2017    right leg    Blood disorder     Chronic pain disorder     good control    Depression     Diabetes mellitus ([facility] [address] )     type 2    Enlarged prostate     [facility]     past, last was 1 year ago    Hypertension     Psychiatric disorder     bipolar    Psychiatric illness     Psychosis [facility]     Sleep apnea     snores but hasn't been tested    Vertigo        Past Surgical History:   Procedure Laterality Date    CYST REMOVAL      ESOPHAGOGASTRODUODENOSCOPY N/A 11/9/2016    Procedure: ESOPHAGOGASTRODUODENOSCOPY (EGD); Surgeon: Corina Guan MD;  Location: [facility]; Service:     ESOPHAGOGASTRODUODENOSCOPY N/A 9/2/2016    Procedure: ESOPHAGOGASTRODUODENOSCOPY (EGD); Surgeon: Corina Guan MD;  Location:  GI LAB; Service:     HERNIA REPAIR         Family History   Problem Relation Age of Onset    No Known Problems Mother     Colon cancer Father 61    No Known Problems Sister     No Known Problems Brother     No Known Problems Maternal Aunt     No Known Problems Paternal Aunt     No Known Problems Maternal Uncle     No Known Problems Paternal Uncle     No Known Problems Maternal Grandfather     No Known Problems Maternal Grandmother     No Known Problems Paternal Grandfather     Hypertension Paternal Grandmother     No Known Problems Cousin     Prostate cancer Family     Stroke Family     Diabetes Family         MELLITUS    Coronary artery disease Family      I have reviewed and agree with the history as documented  Social History     Tobacco Use    Smoking status: Current Every Day Smoker     Packs/day: 0 20     Years: 15 00     Pack years: 3 00    Smokeless tobacco: Current User    Tobacco comment: smokes occasionally; LIGHT TOBACCO SMOKER, TOBACCO USE, CIGARETTE, 2 CIGARETTES/DAY AS PER NEXTGEN   Substance Use Topics    Alcohol use: Yes     Alcohol/week: 12 0 standard drinks     Types: 12 Cans of beer per week     Comment: "bout a case a week"    Drug use: Yes     Types: Marijuana, Cocaine       Review of Systems   Constitutional: Negative for diaphoresis and fever  HENT: Negative for ear pain and sore throat  Respiratory: Positive for shortness of breath  Negative for cough, hemoptysis, sputum production and wheezing  Cardiovascular: Positive for chest pain  Negative for claudication, syncope and PND  Gastrointestinal: Negative for abdominal pain and vomiting  Musculoskeletal: Negative for neck pain     Skin: Negative for rash    Neurological: Negative for headaches  Physical Exam  Physical Exam   Constitutional: He is oriented to person, place, and time  He appears well-developed and well-nourished  No distress  HENT:   Head: Normocephalic and atraumatic  Mouth/Throat: Oropharynx is clear and moist    Patient maintaining airway and secretions    Eyes: Pupils are equal, round, and reactive to light  Conjunctivae and EOM are normal    Neck: Normal range of motion  Neck supple  Cardiovascular: Normal rate, regular rhythm, normal heart sounds and intact distal pulses  No murmur heard  Pulmonary/Chest: Effort normal  No stridor  No respiratory distress  He has rhonchi in the right lower field and the left lower field  He has rales in the right lower field and the left lower field  No conversational dyspnea no respiratory distress   Abdominal: Soft  Bowel sounds are normal  He exhibits no distension  There is no tenderness  Musculoskeletal: Normal range of motion  He exhibits no edema  Neurological: He is alert and oriented to person, place, and time  No cranial nerve deficit  GCS eye subscore is 4  GCS verbal subscore is 5  GCS motor subscore is 6  No slurred speech no facial asymmetry  No ataxia   Skin: Skin is warm  Capillary refill takes less than 2 seconds  He is not diaphoretic  Nursing note and vitals reviewed        Vital Signs  ED Triage Vitals [02/16/20 1532]   Temperature Pulse Respirations Blood Pressure SpO2   (!) 96 7 °F (35 9 °C) 88 18 132/88 98 %      Temp Source Heart Rate Source Patient Position - Orthostatic VS BP Location FiO2 (%)   Tympanic Monitor Sitting Left arm --      Pain Score       --           Vitals:    02/16/20 1532 02/16/20 1748   BP: 132/88 162/90   Pulse: 88 81   Patient Position - Orthostatic VS: Sitting Sitting         Visual Acuity      ED Medications  Medications   ipratropium-albuterol (DUO-NEB) 0 5-2 5 mg/3 mL inhalation solution 3 mL (3 mL Nebulization Given 2/16/20 1600) predniSONE tablet 60 mg (60 mg Oral Given 2/16/20 1732)       Diagnostic Studies  Results Reviewed     Procedure Component Value Units Date/Time    Urine Microscopic [[id number]]  (Abnormal) Collected:  02/16/20 1704    Lab Status:  Final result Specimen:  Urine, Other Updated:  02/16/20 1728     RBC, UA None Seen /hpf      WBC, UA 4-10 /hpf      Epithelial Cells Moderate /hpf      Bacteria, UA Occasional /hpf     UA (URINE) with reflex to Scope [[id number]]  (Abnormal) Collected:  02/16/20 1704    Lab Status:  Final result Specimen:  Urine, Other Updated:  02/16/20 1714     Color, UA Straw     Clarity, UA Clear     Specific Gravity, UA 1 010     pH, UA 7 0     Leukocytes, UA 25 0     Nitrite, UA Negative     Protein, UA Negative mg/dl      Glucose, UA Negative mg/dl      Ketones, UA Negative mg/dl      Bilirubin, UA Negative     Blood, UA Negative     UROBILINOGEN UA Negative mg/dL     Influenza A/B and RSV PCR [[id number]]  (Normal) Collected:  02/16/20 1559    Lab Status:  Final result Specimen:  Nares from Nose Updated:  02/16/20 1648     INFLUENZA A PCR None Detected     INFLUENZA B PCR None Detected     RSV PCR None Detected    Troponin I [[id number]]  (Normal) Collected:  02/16/20 1608    Lab Status:  Final result Specimen:  Blood from Arm, Right Updated:  02/16/20 1640     Troponin I <0 01 ng/mL     NT-BNP PRO [[id number]]  (Normal) Collected:  02/16/20 1608    Lab Status:  Final result Specimen:  Blood from Arm, Right Updated:  02/16/20 1640     NT-proBNP 79 5 pg/mL     D-Dimer [[id number]]  (Normal) Collected:  02/16/20 1608    Lab Status:  Final result Specimen:  Blood from Arm, Right Updated:  02/16/20 1629     D-Dimer, Quant <0 19 ug/ml FEU     Protime-INR [[id number]]  (Normal) Collected:  02/16/20 1608    Lab Status:  Final result Specimen:  Blood from Arm, Right Updated:  02/16/20 1629     Protime 10 6 seconds      INR 0 96    APTT [[id number]]  (Normal) Collected:  02/16/20 1608    Lab Status:  Final result Specimen:  Blood from Arm, Right Updated:  02/16/20 1629     PTT 27 seconds     Comprehensive metabolic panel [[id number]]  (Abnormal) Collected:  02/16/20 1608    Lab Status:  Final result Specimen:  Blood from Arm, Right Updated:  02/16/20 1626     Sodium 136 mmol/L      Potassium 3 9 mmol/L      Chloride 106 mmol/L      CO2 27 mmol/L      ANION GAP 3 mmol/L      BUN 12 mg/dL      Creatinine 0 99 mg/dL      Glucose 130 mg/dL      Calcium 8 8 mg/dL      AST 21 U/L      ALT 23 U/L      Alkaline Phosphatase 47 U/L      Total Protein 5 8 g/dL      Albumin 3 2 g/dL      Total Bilirubin 0 30 mg/dL      eGFR 102 ml/min/1 73sq m     Narrative:       [place] guidelines for Chronic Kidney Disease (CKD):     Stage 1 with normal or high GFR (GFR > 90 mL/min/1 73 square meters)    Stage 2 Mild CKD (GFR = 60-89 mL/min/1 73 square meters)    Stage 3A Moderate CKD (GFR = 45-59 mL/min/1 73 square meters)    Stage 3B Moderate CKD (GFR = 30-44 mL/min/1 73 square meters)    Stage 4 Severe CKD (GFR = 15-29 mL/min/1 73 square meters)    Stage 5 End Stage CKD (GFR <15 mL/min/1 73 square meters)  Note: GFR calculation is accurate only with a steady state creatinine    Magnesium [[id number]]  (Normal) Collected:  02/16/20 1608    Lab Status:  Final result Specimen:  Blood from Arm, Right Updated:  02/16/20 1626     Magnesium 1 7 mg/dL     CBC and differential [[id number]]  (Abnormal) Collected:  02/16/20 1608    Lab Status:  Final result Specimen:  Blood from Arm, Right Updated:  02/16/20 1617     WBC 5 10 Thousand/uL      RBC 4 78 Million/uL      Hemoglobin 13 2 g/dL      Hematocrit 41 0 %      MCV 86 fL      MCH 27 6 pg      MCHC 32 1 g/dL      RDW 14 3 %      MPV 7 8 fL      Platelets 652 Thousands/uL      Neutrophils Relative 45 %      Lymphocytes Relative 45 %      Monocytes Relative 8 %      Eosinophils Relative 2 %      Basophils Relative 0 %      Neutrophils Absolute 2 30 Thousands/µL Lymphocytes Absolute 2 30 Thousands/µL      Monocytes Absolute 0 40 Thousand/µL      Eosinophils Absolute 0 10 Thousand/µL      Basophils Absolute 0 00 Thousands/µL                  CT chest without contrast   Final Result by Jorge Selby MD (02/16 1713)      No pulmonary abnormality identified  Workstation performed: [id number]                    Procedures  Procedures         ED Course  ED Course as of Feb 16 1749   Neha Johnson Feb 16, 2020   1544 Patient is a 49-year-old male with multiple comorbidities coming in today with worsening shortness of breath  On exam he is hemodynamically stable in no acute distress in no respiratory distress  However on exam he does have rales and rhonchi at bases  Will obtain labs including EKG, troponin, BNP flu as well as CT scan  Portions of the record may have been created with voice recognition software  Occasional wrong word or "sound a like" substitutions may have occurred due to the inherent limitations of voice recognition software  Read the chart carefully and recognize, using context, where substitutions have occurred  1633 Patient's labs thus far reveal no evidence of anemia, leukocytosis, end-organ damage  D-dimer negative will continue CT without contrast      1715 CT without acute pathology  Influenza A/B and RSV negative  Will check ambulatory sat      1720 Patient updated on labs including negative D-dimer troponin and heart score  Patient feels better  On read exam patient remains in no respiratory distress  He has no rales and rhonchi at the bases  I do feel this is more an asthma exacerbation given workup and reexamination  He is agreeable plan of care  1735 Patient ambulated around the ER with O2 sats greater than 98% with no respiratory distress and no dyspnea on exertion  Will DC home with prednisone as well as nebulizer and DuoNebs    Return to ER instructions given            HEART Risk Score      Most Recent Value   History  0 Filed at: 02/16/2020 1642   ECG  0 Filed at: 02/16/2020 1642   Age  1 Filed at: 02/16/2020 1642   Risk Factors  2 Filed at: 02/16/2020 1642   Troponin  0 Filed at: 02/16/2020 1642   Heart Score Risk Calculator   History  0 Filed at: 02/16/2020 1642   ECG  0 Filed at: 02/16/2020 1642   Age  1 Filed at: 02/16/2020 1642   Risk Factors  2 Filed at: 02/16/2020 1642   Troponin  0 Filed at: 02/16/2020 1642   HEART Score  3 Filed at: 02/16/2020 1642   HEART Score  3 Filed at: 02/16/2020 1642            PERC Rule for PE      Most Recent Value   PERC Rule for PE   Age >=50  1 Filed at: 02/16/2020 1601   HR >=100  0 Filed at: 02/16/2020 1601   O2 Sat on room air < 95%  0 Filed at: 02/16/2020 1601   History of PE or DVT  1 Filed at: 02/16/2020 1601   Recent trauma or surgery  0 Filed at: 02/16/2020 1601   Hemoptysis  0 Filed at: 02/16/2020 1601   Exogenous estrogen  0 Filed at: 02/16/2020 1601   Unilateral leg swelling  0 Filed at: 02/16/2020 1601   PERC Rule for PE Results  2 Filed at: 02/16/2020 1601                Wells' Criteria for PE      Most Recent Value   Wells' Criteria for PE   Clinical signs and symptoms of DVT  0 Filed at: 02/16/2020 1601   PE is primary diagnosis or equally likely  0 Filed at: 02/16/2020 1601   HR >100  0 Filed at: 02/16/2020 1601   Immobilization at least 3 days or Surgery in the previous 4 weeks  0 Filed at: 02/16/2020 1601   Previous, objectively diagnosed PE or DVT  1 5 Filed at: 02/16/2020 1601   Hemoptysis  0 Filed at: 02/16/2020 1601   Malignancy with treatment within 6 months or palliative  0 Filed at: 02/16/2020 1601   Wells' Criteria Total  1 5 Filed at: 02/16/2020 1601            MDM  Number of Diagnoses or Management Options  Diagnosis management comments:     EKG INTERPRETATION 1559  RHYTHM:  Normal sinus rhythm at 70 beats per minute  AXIS:  Normal axis  INTERVALS:  Pr interval measured at 156 milliseconds  QRS COMPLEX:  QRS measured at 72 milliseconds  ST SEGMENT:  Nonspecific ST segment changes  Diffuse artifact  QT INTERVAL:  QTC measured at 405 milliseconds  COMPARED WITH PRIOR   Ronyluz maria Kennedy Interpretation by Hope Glover DO    Differential diagnosis includes but not limited to:  COPD exacerbation, asthma exacerbation, pneumonia, PE, pulmonary edema, pulmonary effusions, lung mass/malignancy, CHF, ACS, NSTEMI, acute kidney injury, electrolyte dysfunction, inhalation injury, anemia, valvular disorder, viral etiology,         Amount and/or Complexity of Data Reviewed  Clinical lab tests: ordered  Tests in the radiology section of CPT®: ordered  Tests in the medicine section of CPT®: ordered          Disposition  Final diagnoses:   Asthma exacerbation   SOB (shortness of breath)     Time reflects when diagnosis was documented in both MDM as applicable and the Disposition within this note     Time User Action Codes Description Comment    2/16/2020  5:38 PM Kendra Kelley Add [J45 901] Asthma exacerbation     2/16/2020  5:38 PM Maxi Beatty Add [R06 02] SOB (shortness of breath)       ED Disposition     ED Disposition Condition Date/Time Comment    Discharge Stable [place] Feb 16, 2020  5:36 PM Flavia Collins discharge to home/self care              Follow-up Information     Follow up With Specialties Details Why Lynette Schedule an appointment as soon as possible for a visit in 3 days  [place] [id number]  [phone]            Discharge Medication List as of 2/16/2020  5:39 PM      START taking these medications    Details   ipratropium-albuterol (DUO-NEB) 0 5-2 5 mg/3 mL nebulizer solution Take 1 vial (3 mL total) by nebulization 4 (four) times a day as needed for wheezing or shortness of breath for up to 4 days, Starting Sun 2/16/2020, Until u 2/20/2020, Print      predniSONE 20 mg tablet Take 3 tablets (60 mg total) by mouth daily for 4 days, Starting Sun 2/16/2020, Until Thu 2/20/2020, Print         CONTINUE these medications which have NOT CHANGED    Details   ACCU-CHEK FASTCLIX LANCETS MISC by Does not apply route daily, Starting Wed 11/28/2018, Normal      ACCU-CHEK GUIDE test strip Historical Med      albuterol (PROAIR HFA) 90 mcg/act inhaler Inhale 2 puffs every 4 (four) hours as needed for wheezing or shortness of breath, Starting Thu 12/12/2019, Normal      atorvastatin (LIPITOR) 10 mg tablet Take 1 tablet (10 mg total) by mouth daily, Starting Thu 12/12/2019, Normal      Blood Glucose Monitoring Suppl (ACCU-CHEK GUIDE) w/Device KIT by Does not apply route daily, Starting Wed 11/28/2018, Normal      emtricitabine-tenofovir (TRUVADA) 200-300 mg per tablet Take 1 tablet by mouth every 24 hours, Starting Thu 12/12/2019, Normal      hydrocortisone 2 5 % ointment Apply topically 2 (two) times a day, Starting Wed 8/28/2019, Normal      loratadine (CLARITIN) 10 mg tablet Take 1 tablet (10 mg total) by mouth daily, Starting Thu 12/12/2019, Normal      metFORMIN (GLUCOPHAGE) 1000 MG tablet Take 1 tablet (1,000 mg total) by mouth 2 (two) times a day with meals, Starting Thu 12/12/2019, Normal      nicotine (NICODERM CQ) 7 mg/24hr TD 24 hr patch Place 1 patch on the skin every 24 hours, Starting Fri 3/1/2019, Normal      rivaroxaban (XARELTO) 20 mg tablet Take 1 tablet (20 mg total) by mouth every 24 hours, Starting Thu 12/12/2019, Normal      tamsulosin (FLOMAX) 0 4 mg Take 1 capsule (0 4 mg total) by mouth daily with dinner, Starting Thu 12/12/2019, Normal           Outpatient Discharge Orders   Nebulizer Supplies       PDMP Review     None          ED Provider  Electronically Signed by           Tasha Izaguirre DO  02/16/20 3517

## 2020-02-16 NOTE — ED NOTES
Patient ambulated around ED, maintained O2 no less than 97% on room air  Dr Nathalie Luke informed        [facility]  02/16/20 3041

## 2020-02-16 NOTE — DISCHARGE INSTRUCTIONS
As discussed, make sure you complete full course of prednisone  If you have any worsening symptoms or development of fevers or turn to the ER immediately      Make sure you follow-up with your family doctor this week

## 2020-03-12 ENCOUNTER — OFFICE VISIT (OUTPATIENT)
Dept: FAMILY MEDICINE CLINIC | Facility: CLINIC | Age: 50
End: 2020-03-12

## 2020-03-12 ENCOUNTER — HOSPITAL ENCOUNTER (OUTPATIENT)
Dept: NON INVASIVE DIAGNOSTICS | Facility: HOSPITAL | Age: 50
Discharge: HOME | End: 2020-03-12
Payer: COMMERCIAL

## 2020-03-12 VITALS
OXYGEN SATURATION: 97 % | RESPIRATION RATE: 14 BRPM | HEART RATE: 100 BPM | SYSTOLIC BLOOD PRESSURE: 112 MMHG | WEIGHT: 185 LBS | DIASTOLIC BLOOD PRESSURE: 70 MMHG | BODY MASS INDEX: 26.54 KG/M2 | TEMPERATURE: 97.6 F

## 2020-03-12 DIAGNOSIS — M79.605 PAIN IN BOTH LOWER EXTREMITIES: ICD-10-CM

## 2020-03-12 DIAGNOSIS — Z72.51 HIGH RISK SEXUAL BEHAVIOR, UNSPECIFIED TYPE: ICD-10-CM

## 2020-03-12 DIAGNOSIS — M79.604 PAIN IN BOTH LOWER EXTREMITIES: ICD-10-CM

## 2020-03-12 DIAGNOSIS — M79.89 LEG SWELLING: ICD-10-CM

## 2020-03-12 DIAGNOSIS — Z12.5 PROSTATE CANCER SCREENING: ICD-10-CM

## 2020-03-12 DIAGNOSIS — R61 NIGHT SWEATS: ICD-10-CM

## 2020-03-12 DIAGNOSIS — Z86.718 HISTORY OF DVT (DEEP VEIN THROMBOSIS): ICD-10-CM

## 2020-03-12 DIAGNOSIS — E11.9 TYPE 2 DIABETES MELLITUS WITHOUT COMPLICATION, WITHOUT LONG-TERM CURRENT USE OF INSULIN (HCC): Primary | ICD-10-CM

## 2020-03-12 DIAGNOSIS — Z80.42 FAMILY HISTORY OF PROSTATE CANCER: ICD-10-CM

## 2020-03-12 LAB — SL AMB POCT HEMOGLOBIN AIC: 7.6 (ref ?–6.5)

## 2020-03-12 PROCEDURE — 83036 HEMOGLOBIN GLYCOSYLATED A1C: CPT | Performed by: NURSE PRACTITIONER

## 2020-03-12 PROCEDURE — 93970 EXTREMITY STUDY: CPT

## 2020-03-12 PROCEDURE — T1015 CLINIC SERVICE: HCPCS | Performed by: NURSE PRACTITIONER

## 2020-03-12 PROCEDURE — 99214 OFFICE O/P EST MOD 30 MIN: CPT | Performed by: NURSE PRACTITIONER

## 2020-03-12 PROCEDURE — 4004F PT TOBACCO SCREEN RCVD TLK: CPT | Performed by: NURSE PRACTITIONER

## 2020-03-12 PROCEDURE — 3051F HG A1C>EQUAL 7.0%<8.0%: CPT | Performed by: NURSE PRACTITIONER

## 2020-03-12 PROCEDURE — 93970 EXTREMITY STUDY: CPT | Performed by: SURGERY

## 2020-03-12 NOTE — PATIENT INSTRUCTIONS
Leg Edema   WHAT YOU NEED TO KNOW:   Leg edema is swelling caused by fluid buildup  Your legs may swell if you sit or stand for long periods of time, are pregnant, or are injured  Swelling may also occur if you have heart failure or circulation problems  This means that your heart does not pump blood through your body as it should  DISCHARGE INSTRUCTIONS:   Self-care:   · Elevate your legs:  Raise your legs above the level of your heart as often as you can  This will help decrease swelling and pain  Prop your legs on pillows or blankets to keep them elevated comfortably  · Wear pressure stockings: These tight stockings put pressure on your legs to promote blood flow and prevent blood clots  Wear the stockings during the day  Do not wear them while you sleep  · Apply heat:  Heat helps decrease pain and swelling  Apply heat on the area for 20 to 30 minutes every 2 hours for as many days as directed  · Stay active:  Do not stand or sit for long periods of time  Ask your healthcare provider about the best exercise plan for you  · Eat healthy foods:  Healthy foods include fruits, vegetables, whole-grain breads, low-fat dairy products, beans, lean meats, and fish  Ask if you need to be on a special diet  Limit salt  Salt will make your body hold even more fluid  Follow up with your healthcare provider as directed:  Write down your questions so you remember to ask them during your visits  Contact your healthcare provider if:   · You have a fever or feel more tired than usual     · The veins in your legs look larger than usual  They may look full or bulging  · Your legs itch or feel heavy  · You have red or white areas or sores on your legs  The skin may also appear dimpled or have indentations  · You are gaining weight  · You have trouble moving your ankles  · The swelling does not go away, or other parts of your body swell      · You have questions or concerns about your condition or care   Return to the emergency department if:   · You cannot walk  · You feel faint or confused  · Your skin turns blue or gray  · Your leg feels warm, tender, and painful  It may be swollen and red  · You have chest pain or trouble breathing that is worse when you lie down  · You suddenly feel lightheaded and have trouble breathing  · You have new and sudden chest pain  You may have more pain when you take deep breaths or cough  You may also cough up blood  © 2017 [employer]  Information is for End User's use only and may not be sold, redistributed or otherwise used for commercial purposes  All illustrations and images included in CareNotes® are the copyrighted property of [employer]  or [employer] Vill[employer]en[employer]  The above information is an  only  It is not intended as medical advice for individual conditions or treatments  Talk to your doctor, nurse or pharmacist before following any medical regimen to see if it is safe and effective for you

## 2020-03-12 NOTE — PROGRESS NOTES
Assessment/Plan:    Amy Causey was seen today for follow-up and leg swelling  Diagnoses and all orders for this visit:    Type 2 diabetes mellitus without complication, without long-term current use of insulin (HCC)  -     POCT hemoglobin A1c    Leg swelling  -     CBC and differential; Future  -     Comprehensive metabolic panel; Future  -     Lipid panel; Future  -     TSH, 3rd generation with Free T4 reflex; Future  -     RPR; Future  -     Quantiferon TB Gold Plus; Future  -     Human Immunodeficiency Virus 1/2 Antigen / Antibody ( Fourth Generation) with Reflex Testing; Future  -     XR chest pa & lateral; Future  -     VAS lower limb venous duplex study, complete bilateral; Future  -     Hepatitis C Ab W/Refl To HCV RNA, Qn, PCR; Future    Night sweats  -     CBC and differential; Future  -     Comprehensive metabolic panel; Future  -     Lipid panel; Future  -     TSH, 3rd generation with Free T4 reflex; Future  -     RPR; Future  -     Quantiferon TB Gold Plus; Future  -     Human Immunodeficiency Virus 1/2 Antigen / Antibody ( Fourth Generation) with Reflex Testing; Future  -     XR chest pa & lateral; Future  -     Hepatitis C Ab W/Refl To HCV RNA, Qn, PCR; Future    Pain in both lower extremities  -     VAS lower limb venous duplex study, complete bilateral; Future    History of DVT (deep vein thrombosis)  -     VAS lower limb venous duplex study, complete bilateral; Future    Family history of prostate cancer  -     PSA, total and free; Future    Prostate cancer screening  -     PSA, total and free; Future    High risk sexual behavior, unspecified type     Will send the patient for stat doppler to rule out DVT (he is going immediately from visit to get doppler across the street)  Wells score of 2  Patient does have 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  Will also order CXR and labs 2/2 to night sweats and chills   Also patient has subjectively been exposed to HIV for years and does not appear to have had documented HIV testing since 2018 although taking Truvada as Prep  Recommend close follow up  RTC and ED parameters discussed in depth with the patient  Return in about 1 week (around 3/19/2020) for FU leg swelling   Patient Instructions   Leg Edema   WHAT YOU NEED TO KNOW:   Leg edema is swelling caused by fluid buildup  Your legs may swell if you sit or stand for long periods of time, are pregnant, or are injured  Swelling may also occur if you have heart failure or circulation problems  This means that your heart does not pump blood through your body as it should  DISCHARGE INSTRUCTIONS:   Self-care:   · Elevate your legs:  Raise your legs above the level of your heart as often as you can  This will help decrease swelling and pain  Prop your legs on pillows or blankets to keep them elevated comfortably  · Wear pressure stockings: These tight stockings put pressure on your legs to promote blood flow and prevent blood clots  Wear the stockings during the day  Do not wear them while you sleep  · Apply heat:  Heat helps decrease pain and swelling  Apply heat on the area for 20 to 30 minutes every 2 hours for as many days as directed  · Stay active:  Do not stand or sit for long periods of time  Ask your healthcare provider about the best exercise plan for you  · Eat healthy foods:  Healthy foods include fruits, vegetables, whole-grain breads, low-fat dairy products, beans, lean meats, and fish  Ask if you need to be on a special diet  Limit salt  Salt will make your body hold even more fluid  Follow up with your healthcare provider as directed:  Write down your questions so you remember to ask them during your visits  Contact your healthcare provider if:   · You have a fever or feel more tired than usual     · The veins in your legs look larger than usual  They may look full or bulging  · Your legs itch or feel heavy  · You have red or white areas or sores on your legs  The skin may also appear dimpled or have indentations  · You are gaining weight  · You have trouble moving your ankles  · The swelling does not go away, or other parts of your body swell  · You have questions or concerns about your condition or care  Return to the emergency department if:   · You cannot walk  · You feel faint or confused  · Your skin turns blue or gray  · Your leg feels warm, tender, and painful  It may be swollen and red  · You have chest pain or trouble breathing that is worse when you lie down  · You suddenly feel lightheaded and have trouble breathing  · You have new and sudden chest pain  You may have more pain when you take deep breaths or cough  You may also cough up blood  © 2017 [employer]  Information is for End User's use only and may not be sold, redistributed or otherwise used for commercial purposes  All illustrations and images included in CareNotes® are the copyrighted property of [employer]  or [employer]  The above information is an  only  It is not intended as medical advice for individual conditions or treatments  Talk to your doctor, nurse or pharmacist before following any medical regimen to see if it is safe and effective for you  Diagnoses and all orders for this visit:    Type 2 diabetes mellitus without complication, without long-term current use of insulin (HCC)  -     POCT hemoglobin A1c    Leg swelling  -     CBC and differential; Future  -     Comprehensive metabolic panel; Future  -     Lipid panel; Future  -     TSH, 3rd generation with Free T4 reflex; Future  -     RPR; Future  -     Quantiferon TB Gold Plus; Future  -     Human Immunodeficiency Virus 1/2 Antigen / Antibody ( Fourth Generation) with Reflex Testing;  Future  -     XR chest pa & lateral; Future  -     VAS lower limb venous duplex study, complete bilateral; Future  -     Hepatitis C Ab W/Refl To HCV RNA, Qn, PCR; Future    Night sweats  -     CBC and differential; Future  -     Comprehensive metabolic panel; Future  -     Lipid panel; Future  -     TSH, 3rd generation with Free T4 reflex; Future  -     RPR; Future  -     Quantiferon TB Gold Plus; Future  -     Human Immunodeficiency Virus 1/2 Antigen / Antibody ( Fourth Generation) with Reflex Testing; Future  -     XR chest pa & lateral; Future  -     Hepatitis C Ab W/Refl To HCV RNA, Qn, PCR; Future    Pain in both lower extremities  -     VAS lower limb venous duplex study, complete bilateral; Future    History of DVT (deep vein thrombosis)  -     VAS lower limb venous duplex study, complete bilateral; Future    Family history of prostate cancer  -     PSA, total and free; Future    Prostate cancer screening  -     PSA, total and free; Future    High risk sexual behavior, unspecified type          Subjective:     Cortes Amaro is a 44 y o  male who  has a past medical history of Anemia, Anxiety, Arthritis, Asthma, Blood clot in vein, Blood disorder, Chronic pain disorder, Depression, Diabetes mellitus ([facility] [address] ), Enlarged prostate, Epilepsy ([facility] [address] ), Hypertension, Psychiatric disorder, Psychiatric illness, Psychosis ([facility] [address] ), Sleep apnea, and Vertigo  who presented to the office today for night sweats and leg swelling  He reports that he has had these issues for about 3 weeks  He is reporting tenderness to the bilateral calf area  He denies shortness of breath, chest pain, or dizziness  He denies visual changes or palpitations  He does report that he has had sexual contact with an HIV positive person for several years and he recently found out that the person was not taking their ART  He has however been taking Truvada for several months  The patient does also have a hx of protein S deficiency and DVT now on Xaralto  Patient reports that smokes tobacco, uses alcohol almost daily, and uses crack cocaine "sometimes" (last use was yesterday 3/11/2020)       The following portions of the patient's history were reviewed and updated as appropriate: allergies, current medications, past family history, past medical history, past social history, past surgical history and problem list     Current Outpatient Medications on File Prior to Visit   Medication Sig Dispense Refill    ACCU-CHEK FASTCLIX LANCETS MISC by Does not apply route daily 100 each 5    ACCU-CHEK GUIDE test strip       albuterol (PROAIR HFA) 90 mcg/act inhaler Inhale 2 puffs every 4 (four) hours as needed for wheezing or shortness of breath 1 Inhaler 3    atorvastatin (LIPITOR) 10 mg tablet Take 1 tablet (10 mg total) by mouth daily 30 tablet 3    Blood Glucose Monitoring Suppl (ACCU-CHEK GUIDE) w/Device KIT by Does not apply route daily 1 kit 0    emtricitabine-tenofovir (TRUVADA) 200-300 mg per tablet Take 1 tablet by mouth every 24 hours 30 tablet 5    hydrocortisone 2 5 % ointment Apply topically 2 (two) times a day 30 g 0    loratadine (CLARITIN) 10 mg tablet Take 1 tablet (10 mg total) by mouth daily 30 tablet 5    metFORMIN (GLUCOPHAGE) 1000 MG tablet Take 1 tablet (1,000 mg total) by mouth 2 (two) times a day with meals 60 tablet 3    nicotine (NICODERM CQ) 7 mg/24hr TD 24 hr patch Place 1 patch on the skin every 24 hours 28 patch 3    rivaroxaban (XARELTO) 20 mg tablet Take 1 tablet (20 mg total) by mouth every 24 hours 30 tablet 5    tamsulosin (FLOMAX) 0 4 mg Take 1 capsule (0 4 mg total) by mouth daily with dinner 30 capsule 3     No current facility-administered medications on file prior to visit  Review of Systems   Constitutional: Positive for fatigue  Negative for activity change, appetite change, chills, fever and unexpected weight change  +night sweats    HENT: Negative for hearing loss, nosebleeds, sinus pain, sneezing, sore throat and trouble swallowing  Eyes: Negative for photophobia and visual disturbance     Respiratory: Negative for cough, chest tightness, shortness of breath and wheezing  Cardiovascular: Positive for leg swelling  Negative for chest pain and palpitations  Gastrointestinal: Negative for abdominal pain, blood in stool, constipation, diarrhea, nausea and vomiting  Genitourinary: Negative for decreased urine volume, difficulty urinating, discharge, dysuria, flank pain, genital sores, hematuria, penile pain, penile swelling, scrotal swelling, testicular pain and urgency  Musculoskeletal: Negative for back pain, gait problem, myalgias and neck pain  Skin: Negative for pallor, rash and wound  Neurological: Negative for dizziness, seizures, syncope, weakness, numbness and headaches  Hematological: Negative for adenopathy  Does not bruise/bleed easily  Objective:    /70 (BP Location: Left arm, Patient Position: Sitting, Cuff Size: Adult)   Pulse 100   Temp 97 6 °F (36 4 °C) (Temporal)   Resp 14   Wt 83 9 kg (185 lb)   SpO2 97%   BMI 26 54 kg/m²     Physical Exam   Constitutional: He is oriented to person, place, and time  He appears well-developed and well-nourished  No distress  HENT:   Head: Normocephalic and atraumatic  Right Ear: External ear normal    Left Ear: External ear normal    Nose: Nose normal    Mouth/Throat: Oropharynx is clear and moist  No oropharyngeal exudate  Eyes: Pupils are equal, round, and reactive to light  Conjunctivae and EOM are normal  Right eye exhibits no discharge  Left eye exhibits no discharge  Neck: Normal range of motion  Neck supple  Cardiovascular: Normal rate, regular rhythm, normal heart sounds and intact distal pulses  Pulmonary/Chest: Effort normal and breath sounds normal  No respiratory distress  He has no wheezes  Abdominal: Soft  Bowel sounds are normal  He exhibits no distension  There is no tenderness  There is no rebound and no guarding  Musculoskeletal: Normal range of motion  He exhibits no edema or deformity     Right calf measures 40 cm  Left calf measures 41 cm  Sharon Dye sign bilaterally   No erythema or wound  Sensation intact to BL feet (monofilament)  +NVI BL legs and feet     Lymphadenopathy:     He has no cervical adenopathy  Neurological: He is alert and oriented to person, place, and time  He displays normal reflexes  No sensory deficit  Coordination normal    Skin: Skin is warm and dry  Capillary refill takes less than 2 seconds  No rash noted  He is not diaphoretic  Psychiatric: He has a normal mood and affect  His behavior is normal    Nursing note and vitals reviewed        JONELLE Walker  03/12/20  3:57 PM

## 2020-03-13 ENCOUNTER — APPOINTMENT (OUTPATIENT)
Dept: LAB | Facility: HOSPITAL | Age: 50
End: 2020-03-13
Payer: COMMERCIAL

## 2020-03-13 ENCOUNTER — HOSPITAL ENCOUNTER (OUTPATIENT)
Dept: RADIOLOGY | Facility: HOSPITAL | Age: 50
Discharge: HOME | End: 2020-03-13
Payer: COMMERCIAL

## 2020-03-13 ENCOUNTER — TELEPHONE (OUTPATIENT)
Dept: FAMILY MEDICINE CLINIC | Facility: CLINIC | Age: 50
End: 2020-03-13

## 2020-03-13 DIAGNOSIS — Z12.5 PROSTATE CANCER SCREENING: ICD-10-CM

## 2020-03-13 DIAGNOSIS — M79.89 LEG SWELLING: ICD-10-CM

## 2020-03-13 DIAGNOSIS — R61 NIGHT SWEATS: ICD-10-CM

## 2020-03-13 DIAGNOSIS — Z80.42 FAMILY HISTORY OF PROSTATE CANCER: ICD-10-CM

## 2020-03-13 LAB
ALBUMIN SERPL BCP-MCNC: 3.9 G/DL (ref 3–5.2)
ALP SERPL-CCNC: 59 U/L (ref 43–122)
ALT SERPL W P-5'-P-CCNC: 27 U/L (ref 9–52)
ANION GAP SERPL CALCULATED.3IONS-SCNC: 7 MMOL/L (ref 5–14)
AST SERPL W P-5'-P-CCNC: 34 U/L (ref 17–59)
BASOPHILS # BLD AUTO: 0 THOUSANDS/ΜL (ref 0–0.1)
BASOPHILS NFR BLD AUTO: 1 % (ref 0–1)
BILIRUB SERPL-MCNC: 0.4 MG/DL
BUN SERPL-MCNC: 25 MG/DL (ref 5–25)
CALCIUM SERPL-MCNC: 9.1 MG/DL (ref 8.4–10.2)
CHLORIDE SERPL-SCNC: 101 MMOL/L (ref 97–108)
CHOLEST SERPL-MCNC: 148 MG/DL
CO2 SERPL-SCNC: 29 MMOL/L (ref 22–30)
CREAT SERPL-MCNC: 1.27 MG/DL (ref 0.7–1.5)
EOSINOPHIL # BLD AUTO: 0.1 THOUSAND/ΜL (ref 0–0.4)
EOSINOPHIL NFR BLD AUTO: 1 % (ref 0–6)
ERYTHROCYTE [DISTWIDTH] IN BLOOD BY AUTOMATED COUNT: 14.7 %
GFR SERPL CREATININE-BSD FRML MDRD: 75 ML/MIN/1.73SQ M
GLUCOSE P FAST SERPL-MCNC: 146 MG/DL (ref 70–99)
HCT VFR BLD AUTO: 44.6 % (ref 41–53)
HDLC SERPL-MCNC: 51 MG/DL
HGB BLD-MCNC: 14.5 G/DL (ref 13.5–17.5)
LDLC SERPL CALC-MCNC: 74 MG/DL
LYMPHOCYTES # BLD AUTO: 1.7 THOUSANDS/ΜL (ref 0.5–4)
LYMPHOCYTES NFR BLD AUTO: 38 % (ref 25–45)
MCH RBC QN AUTO: 28 PG (ref 26–34)
MCHC RBC AUTO-ENTMCNC: 32.5 G/DL (ref 31–36)
MCV RBC AUTO: 86 FL (ref 80–100)
MONOCYTES # BLD AUTO: 0.4 THOUSAND/ΜL (ref 0.2–0.9)
MONOCYTES NFR BLD AUTO: 10 % (ref 1–10)
NEUTROPHILS # BLD AUTO: 2.2 THOUSANDS/ΜL (ref 1.8–7.8)
NEUTS SEG NFR BLD AUTO: 50 % (ref 45–65)
NONHDLC SERPL-MCNC: 97 MG/DL
PLATELET # BLD AUTO: 298 THOUSANDS/UL (ref 150–450)
PMV BLD AUTO: 8.3 FL (ref 8.9–12.7)
POTASSIUM SERPL-SCNC: 5.1 MMOL/L (ref 3.6–5)
PROT SERPL-MCNC: 6.7 G/DL (ref 5.9–8.4)
RBC # BLD AUTO: 5.17 MILLION/UL (ref 4.5–5.9)
SODIUM SERPL-SCNC: 137 MMOL/L (ref 137–147)
TRIGL SERPL-MCNC: 114 MG/DL
TSH SERPL DL<=0.05 MIU/L-ACNC: 0.96 UIU/ML (ref 0.47–4.68)
WBC # BLD AUTO: 4.4 THOUSAND/UL (ref 4.5–11)

## 2020-03-13 PROCEDURE — 85025 COMPLETE CBC W/AUTO DIFF WBC: CPT

## 2020-03-13 PROCEDURE — 84443 ASSAY THYROID STIM HORMONE: CPT

## 2020-03-13 PROCEDURE — 71046 X-RAY EXAM CHEST 2 VIEWS: CPT

## 2020-03-13 PROCEDURE — 80053 COMPREHEN METABOLIC PANEL: CPT

## 2020-03-13 PROCEDURE — 87389 HIV-1 AG W/HIV-1&-2 AB AG IA: CPT

## 2020-03-13 PROCEDURE — 86480 TB TEST CELL IMMUN MEASURE: CPT

## 2020-03-13 PROCEDURE — 36415 COLL VENOUS BLD VENIPUNCTURE: CPT

## 2020-03-13 PROCEDURE — 80061 LIPID PANEL: CPT

## 2020-03-13 PROCEDURE — 87521 HEPATITIS C PROBE&RVRS TRNSC: CPT

## 2020-03-13 PROCEDURE — 84154 ASSAY OF PSA FREE: CPT

## 2020-03-13 PROCEDURE — 86592 SYPHILIS TEST NON-TREP QUAL: CPT

## 2020-03-13 PROCEDURE — 84153 ASSAY OF PSA TOTAL: CPT

## 2020-03-14 LAB
PSA FREE MFR SERPL: 20.9 %
PSA FREE SERPL-MCNC: 0.48 NG/ML
PSA SERPL-MCNC: 2.3 NG/ML (ref 0–4)

## 2020-03-16 DIAGNOSIS — D68.8 HEREDITARY THROMBOPHILIA (HCC): ICD-10-CM

## 2020-03-16 DIAGNOSIS — R76.12 POSITIVE QUANTIFERON-TB GOLD TEST: Primary | ICD-10-CM

## 2020-03-16 LAB
GAMMA INTERFERON BACKGROUND BLD IA-ACNC: 0.19 IU/ML
HIV 1+2 AB+HIV1 P24 AG SERPL QL IA: NORMAL
M TB IFN-G BLD-IMP: POSITIVE
M TB IFN-G CD4+ BCKGRND COR BLD-ACNC: 6.82 IU/ML
M TB IFN-G CD4+ BCKGRND COR BLD-ACNC: >10 IU/ML
MITOGEN IGNF BCKGRD COR BLD-ACNC: >10 IU/ML
RPR SER QL: NORMAL

## 2020-03-18 ENCOUNTER — TELEPHONE (OUTPATIENT)
Dept: HEMATOLOGY ONCOLOGY | Facility: CLINIC | Age: 50
End: 2020-03-18

## 2020-03-18 NOTE — TELEPHONE ENCOUNTER
New Patient Encounter    New Patient Intake Form   Patient Details:  Fer Range  1970  [id number]    Background Information:  [address] starts by opening a telephone encounter and gathering the following information   Who is calling to schedule? If not self, relationship to patient? 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 Yes   When was the diagnosis? 3/2020   Is there a confirmed diagnosis from a biopsy/tissue reviewed by pathology? n/a   Is patient aware of diagnosis? Yes   Is there a personal history of cancer and what kind? Is there a family history of cancer and what kind? Reason for visit? New Diagnosis   Have you had any imaging or labs done? If so: when, where? Yes  [facility]   Are records in EPIC? yes   Was the patient told to bring a disk? no   Does the patient smoke or Vape? no   If yes, how many packs or cartridges per day? Scheduling Information:   Preferred [facility]:  [facility]     Requesting Specific Provider? Osito Powell   Are there any dates/time the patient cannot be seen? no   Miscellaneous: requested appt  Screening for COVID-19 sx/exposure negative   After completing the above information, please route to Financial Counselor and the appropriate Nurse Navigator for review

## 2020-03-25 LAB — MISCELLANEOUS LAB TEST RESULT: NORMAL

## 2020-04-02 ENCOUNTER — TELEMEDICINE (OUTPATIENT)
Dept: FAMILY MEDICINE CLINIC | Facility: CLINIC | Age: 50
End: 2020-04-02

## 2020-04-02 DIAGNOSIS — R76.12 POSITIVE QUANTIFERON-TB GOLD TEST: Primary | ICD-10-CM

## 2020-04-02 DIAGNOSIS — Z78.9 NEED FOR FOLLOW-UP BY SOCIAL WORKER: ICD-10-CM

## 2020-04-02 PROCEDURE — G2012 BRIEF CHECK IN BY MD/QHP: HCPCS | Performed by: NURSE PRACTITIONER

## 2020-04-03 ENCOUNTER — TELEPHONE (OUTPATIENT)
Dept: HEMATOLOGY ONCOLOGY | Facility: CLINIC | Age: 50
End: 2020-04-03

## 2020-04-08 ENCOUNTER — TELEPHONE (OUTPATIENT)
Dept: HEMATOLOGY ONCOLOGY | Facility: CLINIC | Age: 50
End: 2020-04-08

## 2020-04-09 ENCOUNTER — PATIENT OUTREACH (OUTPATIENT)
Dept: FAMILY MEDICINE CLINIC | Facility: CLINIC | Age: 50
End: 2020-04-09

## 2020-05-04 ENCOUNTER — HOSPITAL ENCOUNTER (EMERGENCY)
Facility: HOSPITAL | Age: 50
Discharge: HOME | End: 2020-05-04
Attending: EMERGENCY MEDICINE | Admitting: EMERGENCY MEDICINE
Payer: COMMERCIAL

## 2020-05-04 ENCOUNTER — APPOINTMENT (EMERGENCY)
Dept: CT IMAGING | Facility: HOSPITAL | Age: 50
End: 2020-05-04
Payer: COMMERCIAL

## 2020-05-04 VITALS
HEART RATE: 89 BPM | RESPIRATION RATE: 18 BRPM | SYSTOLIC BLOOD PRESSURE: 168 MMHG | OXYGEN SATURATION: 100 % | WEIGHT: 184.97 LBS | DIASTOLIC BLOOD PRESSURE: 95 MMHG | TEMPERATURE: 97.9 F | BODY MASS INDEX: 26.54 KG/M2

## 2020-05-04 DIAGNOSIS — R07.9 CHEST PAIN, UNSPECIFIED TYPE: Primary | ICD-10-CM

## 2020-05-04 LAB
ANION GAP SERPL CALCULATED.3IONS-SCNC: 9 MMOL/L (ref 5–14)
APTT PPP: 30 SECONDS (ref 23–37)
ATRIAL RATE: 86 BPM
BASOPHILS # BLD AUTO: 0.1 THOUSANDS/ΜL (ref 0–0.1)
BASOPHILS NFR BLD AUTO: 1 % (ref 0–1)
BUN SERPL-MCNC: 16 MG/DL (ref 5–25)
CALCIUM SERPL-MCNC: 8.9 MG/DL (ref 8.4–10.2)
CHLORIDE SERPL-SCNC: 105 MMOL/L (ref 97–108)
CO2 SERPL-SCNC: 23 MMOL/L (ref 22–30)
CREAT SERPL-MCNC: 1.6 MG/DL (ref 0.7–1.5)
EOSINOPHIL # BLD AUTO: 0.1 THOUSAND/ΜL (ref 0–0.4)
EOSINOPHIL NFR BLD AUTO: 1 % (ref 0–6)
ERYTHROCYTE [DISTWIDTH] IN BLOOD BY AUTOMATED COUNT: 14 %
GFR SERPL CREATININE-BSD FRML MDRD: 57 ML/MIN/1.73SQ M
GLUCOSE SERPL-MCNC: 133 MG/DL (ref 70–99)
HCT VFR BLD AUTO: 42.9 % (ref 41–53)
HGB BLD-MCNC: 14.1 G/DL (ref 13.5–17.5)
INR PPP: 1.44 (ref 0.84–1.19)
LYMPHOCYTES # BLD AUTO: 2.9 THOUSANDS/ΜL (ref 0.5–4)
LYMPHOCYTES NFR BLD AUTO: 50 % (ref 25–45)
MCH RBC QN AUTO: 27.8 PG (ref 26–34)
MCHC RBC AUTO-ENTMCNC: 32.9 G/DL (ref 31–36)
MCV RBC AUTO: 85 FL (ref 80–100)
MONOCYTES # BLD AUTO: 0.4 THOUSAND/ΜL (ref 0.2–0.9)
MONOCYTES NFR BLD AUTO: 7 % (ref 1–10)
NEUTROPHILS # BLD AUTO: 2.3 THOUSANDS/ΜL (ref 1.8–7.8)
NEUTS SEG NFR BLD AUTO: 41 % (ref 45–65)
P AXIS: 59 DEGREES
PLATELET # BLD AUTO: 368 THOUSANDS/UL (ref 150–450)
PMV BLD AUTO: 7.8 FL (ref 8.9–12.7)
POTASSIUM SERPL-SCNC: 3.6 MMOL/L (ref 3.6–5)
PR INTERVAL: 148 MS
PROTHROMBIN TIME: 16.8 SECONDS (ref 11.6–14.5)
QRS AXIS: 60 DEGREES
QRSD INTERVAL: 72 MS
QT INTERVAL: 358 MS
QTC INTERVAL: 428 MS
RBC # BLD AUTO: 5.07 MILLION/UL (ref 4.5–5.9)
SODIUM SERPL-SCNC: 137 MMOL/L (ref 137–147)
T WAVE AXIS: 55 DEGREES
TROPONIN I SERPL-MCNC: <0.01 NG/ML (ref 0–0.03)
VENTRICULAR RATE: 86 BPM
WBC # BLD AUTO: 5.7 THOUSAND/UL (ref 4.5–11)

## 2020-05-04 PROCEDURE — 80048 BASIC METABOLIC PNL TOTAL CA: CPT | Performed by: EMERGENCY MEDICINE

## 2020-05-04 PROCEDURE — 85025 COMPLETE CBC W/AUTO DIFF WBC: CPT | Performed by: EMERGENCY MEDICINE

## 2020-05-04 PROCEDURE — 71275 CT ANGIOGRAPHY CHEST: CPT

## 2020-05-04 PROCEDURE — 93010 ELECTROCARDIOGRAM REPORT: CPT | Performed by: INTERNAL MEDICINE

## 2020-05-04 PROCEDURE — 85610 PROTHROMBIN TIME: CPT | Performed by: EMERGENCY MEDICINE

## 2020-05-04 PROCEDURE — 85730 THROMBOPLASTIN TIME PARTIAL: CPT | Performed by: EMERGENCY MEDICINE

## 2020-05-04 PROCEDURE — 99285 EMERGENCY DEPT VISIT HI MDM: CPT | Performed by: EMERGENCY MEDICINE

## 2020-05-04 PROCEDURE — 36415 COLL VENOUS BLD VENIPUNCTURE: CPT | Performed by: EMERGENCY MEDICINE

## 2020-05-04 PROCEDURE — 93005 ELECTROCARDIOGRAM TRACING: CPT

## 2020-05-04 PROCEDURE — 84484 ASSAY OF TROPONIN QUANT: CPT | Performed by: EMERGENCY MEDICINE

## 2020-05-04 PROCEDURE — 99285 EMERGENCY DEPT VISIT HI MDM: CPT

## 2020-05-04 RX ADMIN — IOHEXOL 85 ML: 350 INJECTION, SOLUTION INTRAVENOUS at 18:50

## 2020-05-12 ENCOUNTER — TELEPHONE (OUTPATIENT)
Dept: FAMILY MEDICINE CLINIC | Facility: CLINIC | Age: 50
End: 2020-05-12

## 2020-05-12 DIAGNOSIS — J45.20 MILD INTERMITTENT ASTHMA WITHOUT COMPLICATION: ICD-10-CM

## 2020-05-20 ENCOUNTER — CONSULT (OUTPATIENT)
Dept: HEMATOLOGY ONCOLOGY | Facility: CLINIC | Age: 50
End: 2020-05-20
Payer: COMMERCIAL

## 2020-05-20 VITALS
SYSTOLIC BLOOD PRESSURE: 108 MMHG | DIASTOLIC BLOOD PRESSURE: 72 MMHG | OXYGEN SATURATION: 98 % | HEART RATE: 115 BPM | BODY MASS INDEX: 27.7 KG/M2 | HEIGHT: 69 IN | WEIGHT: 187 LBS | RESPIRATION RATE: 18 BRPM | TEMPERATURE: 96.6 F

## 2020-05-20 DIAGNOSIS — D68.8 HEREDITARY THROMBOPHILIA (HCC): ICD-10-CM

## 2020-05-20 PROCEDURE — 99203 OFFICE O/P NEW LOW 30 MIN: CPT | Performed by: INTERNAL MEDICINE

## 2020-05-20 PROCEDURE — 3008F BODY MASS INDEX DOCD: CPT | Performed by: INTERNAL MEDICINE

## 2020-05-20 PROCEDURE — 3008F BODY MASS INDEX DOCD: CPT | Performed by: NURSE PRACTITIONER

## 2020-05-20 PROCEDURE — 3051F HG A1C>EQUAL 7.0%<8.0%: CPT | Performed by: INTERNAL MEDICINE

## 2020-05-20 RX ORDER — ALBUTEROL SULFATE 90 UG/1
2 AEROSOL, METERED RESPIRATORY (INHALATION) EVERY 4 HOURS PRN
Qty: 1 INHALER | Refills: 3 | Status: SHIPPED | OUTPATIENT
Start: 2020-05-20 | End: 2020-10-14 | Stop reason: SDUPTHER

## 2020-07-23 DIAGNOSIS — R35.0 BENIGN PROSTATIC HYPERPLASIA WITH URINARY FREQUENCY: ICD-10-CM

## 2020-07-23 DIAGNOSIS — N40.1 BENIGN PROSTATIC HYPERPLASIA WITH URINARY FREQUENCY: ICD-10-CM

## 2020-07-23 DIAGNOSIS — E11.9 TYPE 2 DIABETES MELLITUS WITHOUT COMPLICATION, WITHOUT LONG-TERM CURRENT USE OF INSULIN (HCC): ICD-10-CM

## 2020-07-23 RX ORDER — TAMSULOSIN HYDROCHLORIDE 0.4 MG/1
0.4 CAPSULE ORAL
Qty: 30 CAPSULE | Refills: 0 | Status: SHIPPED | OUTPATIENT
Start: 2020-07-23 | End: 2020-10-14 | Stop reason: SDUPTHER

## 2020-08-07 ENCOUNTER — APPOINTMENT (EMERGENCY)
Dept: RADIOLOGY | Facility: HOSPITAL | Age: 50
End: 2020-08-07
Payer: COMMERCIAL

## 2020-08-07 ENCOUNTER — APPOINTMENT (EMERGENCY)
Dept: NON INVASIVE DIAGNOSTICS | Facility: HOSPITAL | Age: 50
End: 2020-08-07
Payer: COMMERCIAL

## 2020-08-07 ENCOUNTER — HOSPITAL ENCOUNTER (EMERGENCY)
Facility: HOSPITAL | Age: 50
Discharge: HOME | End: 2020-08-07
Attending: EMERGENCY MEDICINE | Admitting: EMERGENCY MEDICINE
Payer: COMMERCIAL

## 2020-08-07 VITALS
BODY MASS INDEX: 30.52 KG/M2 | SYSTOLIC BLOOD PRESSURE: 133 MMHG | HEART RATE: 68 BPM | TEMPERATURE: 97.7 F | RESPIRATION RATE: 17 BRPM | OXYGEN SATURATION: 98 % | DIASTOLIC BLOOD PRESSURE: 85 MMHG | WEIGHT: 205.47 LBS

## 2020-08-07 DIAGNOSIS — S83.91XA SPRAIN OF RIGHT KNEE, UNSPECIFIED LIGAMENT, INITIAL ENCOUNTER: Primary | ICD-10-CM

## 2020-08-07 LAB
ALBUMIN SERPL BCP-MCNC: 3.6 G/DL (ref 3–5.2)
ALP SERPL-CCNC: 66 U/L (ref 43–122)
ALT SERPL W P-5'-P-CCNC: 31 U/L (ref 9–52)
ANION GAP SERPL CALCULATED.3IONS-SCNC: 3 MMOL/L (ref 5–14)
AST SERPL W P-5'-P-CCNC: 43 U/L (ref 17–59)
BASOPHILS # BLD AUTO: 0.1 THOUSANDS/ΜL (ref 0–0.1)
BASOPHILS NFR BLD AUTO: 1 % (ref 0–1)
BILIRUB SERPL-MCNC: 1 MG/DL
BUN SERPL-MCNC: 8 MG/DL (ref 5–25)
CALCIUM SERPL-MCNC: 8.7 MG/DL (ref 8.4–10.2)
CHLORIDE SERPL-SCNC: 101 MMOL/L (ref 97–108)
CO2 SERPL-SCNC: 31 MMOL/L (ref 22–30)
CREAT SERPL-MCNC: 1.08 MG/DL (ref 0.7–1.5)
EOSINOPHIL # BLD AUTO: 0.1 THOUSAND/ΜL (ref 0–0.4)
EOSINOPHIL NFR BLD AUTO: 2 % (ref 0–6)
ERYTHROCYTE [DISTWIDTH] IN BLOOD BY AUTOMATED COUNT: 14.7 %
GFR SERPL CREATININE-BSD FRML MDRD: 91 ML/MIN/1.73SQ M
GLUCOSE SERPL-MCNC: 101 MG/DL (ref 70–99)
HCT VFR BLD AUTO: 36.9 % (ref 41–53)
HGB BLD-MCNC: 12.2 G/DL (ref 13.5–17.5)
INR PPP: 1.42 (ref 0.84–1.19)
LIPASE SERPL-CCNC: 360 U/L (ref 23–300)
LYMPHOCYTES # BLD AUTO: 1.7 THOUSANDS/ΜL (ref 0.5–4)
LYMPHOCYTES NFR BLD AUTO: 25 % (ref 25–45)
MCH RBC QN AUTO: 28.8 PG (ref 26–34)
MCHC RBC AUTO-ENTMCNC: 33 G/DL (ref 31–36)
MCV RBC AUTO: 87 FL (ref 80–100)
MONOCYTES # BLD AUTO: 0.5 THOUSAND/ΜL (ref 0.2–0.9)
MONOCYTES NFR BLD AUTO: 8 % (ref 1–10)
NEUTROPHILS # BLD AUTO: 4.3 THOUSANDS/ΜL (ref 1.8–7.8)
NEUTS SEG NFR BLD AUTO: 64 % (ref 45–65)
PLATELET # BLD AUTO: 306 THOUSANDS/UL (ref 150–450)
PMV BLD AUTO: 8 FL (ref 8.9–12.7)
POTASSIUM SERPL-SCNC: 3.2 MMOL/L (ref 3.6–5)
PROT SERPL-MCNC: 6.5 G/DL (ref 5.9–8.4)
PROTHROMBIN TIME: 17.5 SECONDS (ref 11.6–14.5)
RBC # BLD AUTO: 4.22 MILLION/UL (ref 4.5–5.9)
SODIUM SERPL-SCNC: 135 MMOL/L (ref 137–147)
WBC # BLD AUTO: 6.7 THOUSAND/UL (ref 4.5–11)

## 2020-08-07 PROCEDURE — 80053 COMPREHEN METABOLIC PANEL: CPT | Performed by: PHYSICIAN ASSISTANT

## 2020-08-07 PROCEDURE — 99284 EMERGENCY DEPT VISIT MOD MDM: CPT | Performed by: PHYSICIAN ASSISTANT

## 2020-08-07 PROCEDURE — 93971 EXTREMITY STUDY: CPT | Performed by: SURGERY

## 2020-08-07 PROCEDURE — 85610 PROTHROMBIN TIME: CPT | Performed by: PHYSICIAN ASSISTANT

## 2020-08-07 PROCEDURE — 99284 EMERGENCY DEPT VISIT MOD MDM: CPT

## 2020-08-07 PROCEDURE — 36415 COLL VENOUS BLD VENIPUNCTURE: CPT | Performed by: PHYSICIAN ASSISTANT

## 2020-08-07 PROCEDURE — 93971 EXTREMITY STUDY: CPT

## 2020-08-07 PROCEDURE — 96360 HYDRATION IV INFUSION INIT: CPT

## 2020-08-07 PROCEDURE — 73590 X-RAY EXAM OF LOWER LEG: CPT

## 2020-08-07 PROCEDURE — 85025 COMPLETE CBC W/AUTO DIFF WBC: CPT | Performed by: PHYSICIAN ASSISTANT

## 2020-08-07 PROCEDURE — 83690 ASSAY OF LIPASE: CPT | Performed by: PHYSICIAN ASSISTANT

## 2020-08-07 PROCEDURE — 73552 X-RAY EXAM OF FEMUR 2/>: CPT

## 2020-08-07 RX ORDER — SODIUM CHLORIDE 9 MG/ML
250 INJECTION, SOLUTION INTRAVENOUS CONTINUOUS
Status: DISCONTINUED | OUTPATIENT
Start: 2020-08-07 | End: 2020-08-07 | Stop reason: HOSPADM

## 2020-08-07 RX ORDER — ACETAMINOPHEN 325 MG/1
650 TABLET ORAL ONCE
Status: COMPLETED | OUTPATIENT
Start: 2020-08-07 | End: 2020-08-07

## 2020-08-07 RX ADMIN — SODIUM CHLORIDE 250 ML/HR: 0.9 INJECTION, SOLUTION INTRAVENOUS at 14:47

## 2020-08-07 RX ADMIN — ACETAMINOPHEN 650 MG: 325 TABLET ORAL at 15:36

## 2020-08-07 NOTE — ED NOTES
Carrington Toscano from [facility] patient's duplex was negative  Provider García Hernandez PA-C informed          [facility]  08/07/20 1792

## 2020-08-07 NOTE — ED PROVIDER NOTES
History  Chief Complaint   Patient presents with    Leg Swelling     c/o of swelling to rt  leg since yesterday      Pt with right leg pain and swelling for several days   "just like when I had a blood clot 2-3 yrs ago"      Medical Problem   Location:  Right leg pain and swelling   Severity:  Mild  Onset quality:  Gradual  Duration:  2 days  Timing:  Constant  Progression:  Unchanged  Chronicity:  Recurrent  Associated symptoms: no abdominal pain, no chest pain, no congestion, no cough, no diarrhea, no ear pain, no fatigue, no fever, no headaches, no loss of consciousness, no myalgias, no nausea, no rash, no rhinorrhea, no shortness of breath, no sore throat, no vomiting and no wheezing        Prior to Admission Medications   Prescriptions Last Dose Informant Patient Reported? Taking?    ACCU-CHEK FASTCLIX LANCETS MISC   No No   Sig: by Does not apply route daily   ACCU-CHEK GUIDE test strip   Yes No   Blood Glucose Monitoring Suppl (ACCU-CHEK GUIDE) w/Device KIT   No No   Sig: by Does not apply route daily   albuterol (ProAir HFA) 90 mcg/act inhaler   No No   Sig: Inhale 2 puffs every 4 (four) hours as needed for wheezing or shortness of breath   atorvastatin (LIPITOR) 10 mg tablet   No No   Sig: Take 1 tablet (10 mg total) by mouth daily   emtricitabine-tenofovir (TRUVADA) 200-300 mg per tablet   No No   Sig: Take 1 tablet by mouth every 24 hours   metFORMIN (GLUCOPHAGE) 1000 MG tablet   No No   Sig: Take 1 tablet (1,000 mg total) by mouth 2 (two) times a day with meals   rivaroxaban (XARELTO) 20 mg tablet   No No   Sig: Take 1 tablet (20 mg total) by mouth every 24 hours   tamsulosin (FLOMAX) 0 4 mg   No No   Sig: Take 1 capsule (0 4 mg total) by mouth daily with dinner      Facility-Administered Medications: None       Past Medical History:   Diagnosis Date    Anemia     Anxiety     Arthritis     Asthma     Blood clot in vein 2017    right leg    Blood disorder     Chronic pain disorder     good control  Depression     Diabetes mellitus ([facility] )     type 2    Enlarged prostate     Epilepsy ([facility] )     past, last was 1 year ago    Hypertension     Psychiatric disorder     bipolar    Psychiatric illness     Psychosis ([facility] )     Sleep apnea     snores but hasn't been tested    Vertigo        Past Surgical History:   Procedure Laterality Date    CYST REMOVAL      ESOPHAGOGASTRODUODENOSCOPY N/A 11/9/2016    Procedure: ESOPHAGOGASTRODUODENOSCOPY (EGD); Surgeon: Neil Lopez MD;  Location:  GI LAB; Service:     ESOPHAGOGASTRODUODENOSCOPY N/A 9/2/2016    Procedure: ESOPHAGOGASTRODUODENOSCOPY (EGD); Surgeon: Neil Lopez MD;  Location:  GI LAB; Service:     HERNIA REPAIR         Family History   Problem Relation Age of Onset    No Known Problems Mother     Colon cancer Father 61    No Known Problems Sister     No Known Problems Brother     No Known Problems Maternal Aunt     No Known Problems Paternal Aunt     No Known Problems Maternal Uncle     No Known Problems Paternal Uncle     No Known Problems Maternal Grandfather     No Known Problems Maternal Grandmother     No Known Problems Paternal Grandfather     Hypertension Paternal Grandmother     No Known Problems Cousin     Prostate cancer Family     Stroke Family     Diabetes Family         MELLITUS    Coronary artery disease Family      I have reviewed and agree with the history as documented  E-Cigarette/Vaping     E-Cigarette/Vaping Substances     Social History     Tobacco Use    Smoking status: Current Some Day Smoker     Packs/day: 0 20     Years: 15 00     Pack years: 3 00    Smokeless tobacco: Current User   Substance Use Topics    Alcohol use: Yes     Alcohol/week: 12 0 standard drinks     Types: 12 Cans of beer per week     Frequency: Monthly or less    Drug use: Yes     Types: Marijuana, Cocaine       Review of Systems   Constitutional: Negative  Negative for fatigue and fever  HENT: Negative    Negative for congestion, ear pain, rhinorrhea and sore throat  Eyes: Negative  Respiratory: Negative  Negative for cough, shortness of breath and wheezing  Cardiovascular: Negative  Negative for chest pain  Gastrointestinal: Negative  Negative for abdominal pain, diarrhea, nausea and vomiting  Endocrine: Negative  Genitourinary: Negative  Musculoskeletal: Negative  Negative for myalgias  Skin: Negative  Negative for rash  Allergic/Immunologic: Negative  Neurological: Negative  Negative for loss of consciousness and headaches  Hematological: Negative  Psychiatric/Behavioral: Negative  All other systems reviewed and are negative  Physical Exam  Physical Exam  Vitals signs and nursing note reviewed  Constitutional:       Appearance: Normal appearance  He is normal weight  HENT:      Head: Normocephalic and atraumatic  Right Ear: Tympanic membrane, ear canal and external ear normal       Left Ear: Tympanic membrane, ear canal and external ear normal       Nose: Nose normal       Mouth/Throat:      Mouth: Mucous membranes are moist       Pharynx: Oropharynx is clear  Eyes:      Extraocular Movements: Extraocular movements intact  Conjunctiva/sclera: Conjunctivae normal       Pupils: Pupils are equal, round, and reactive to light  Neck:      Musculoskeletal: Normal range of motion and neck supple  Cardiovascular:      Rate and Rhythm: Normal rate and regular rhythm  Pulses: Normal pulses  Heart sounds: Normal heart sounds  Pulmonary:      Effort: Pulmonary effort is normal       Breath sounds: Normal breath sounds  Abdominal:      General: Abdomen is flat  Bowel sounds are normal       Palpations: Abdomen is soft  Musculoskeletal: Normal range of motion        Comments: Right femoral pulse right dp pulses wnl minor leg swelling no erythema no warmth     Right knee from +popliteal tenderness no valgus no varus no ant/post drawer  +calf pain no erythema Achilles insertion wnl  Ankle toes from    Skin:     General: Skin is warm  Capillary Refill: Capillary refill takes less than 2 seconds  Neurological:      General: No focal deficit present  Mental Status: He is alert and oriented to person, place, and time     Psychiatric:         Mood and Affect: Mood normal          Vital Signs  ED Triage Vitals   Temperature Pulse Respirations Blood Pressure SpO2   08/07/20 1353 08/07/20 1353 08/07/20 1353 08/07/20 1353 08/07/20 1353   (!) 97 4 °F (36 3 °C) 85 16 158/85 96 %      Temp Source Heart Rate Source Patient Position - Orthostatic VS BP Location FiO2 (%)   08/07/20 1353 08/07/20 1353 08/07/20 1353 08/07/20 1353 --   Tympanic Monitor Lying Left arm       Pain Score       08/07/20 1536       9           Vitals:    08/07/20 1353 08/07/20 1404 08/07/20 1614   BP: 158/85 132/75 133/85   Pulse: 85 83 68   Patient Position - Orthostatic VS: Lying Sitting Lying         Visual Acuity      ED Medications  Medications   sodium chloride 0 9 % infusion (0 mL/hr Intravenous Stopped 8/7/20 1613)   acetaminophen (TYLENOL) tablet 650 mg (650 mg Oral Given 8/7/20 1536)       Diagnostic Studies  Results Reviewed     Procedure Component Value Units Date/Time    Protime-INR [[id number]]  (Abnormal) Collected:  08/07/20 1447    Lab Status:  Final result Specimen:  Blood from Arm, Right Updated:  08/07/20 1546     Protime 17 5 seconds      INR 1 42    Lipase [[id number]]  (Abnormal) Collected:  08/07/20 1447    Lab Status:  Final result Specimen:  Blood from Arm, Right Updated:  08/07/20 1541     Lipase 360 u/L     Comprehensive metabolic panel [[id number]]  (Abnormal) Collected:  08/07/20 1447    Lab Status:  Final result Specimen:  Blood from Arm, Right Updated:  08/07/20 1541     Sodium 135 mmol/L      Potassium 3 2 mmol/L      Chloride 101 mmol/L      CO2 31 mmol/L      ANION GAP 3 mmol/L      BUN 8 mg/dL      Creatinine 1 08 mg/dL      Glucose 101 mg/dL      Calcium 8 7 mg/dL AST 43 U/L      ALT 31 U/L      Alkaline Phosphatase 66 U/L      Total Protein 6 5 g/dL      Albumin 3 6 g/dL      Total Bilirubin 1 00 mg/dL      eGFR 91 ml/min/1 73sq m     Narrative:       [place] guidelines for Chronic Kidney Disease (CKD):     Stage 1 with normal or high GFR (GFR > 90 mL/min/1 73 square meters)    Stage 2 Mild CKD (GFR = 60-89 mL/min/1 73 square meters)    Stage 3A Moderate CKD (GFR = 45-59 mL/min/1 73 square meters)    Stage 3B Moderate CKD (GFR = 30-44 mL/min/1 73 square meters)    Stage 4 Severe CKD (GFR = 15-29 mL/min/1 73 square meters)    Stage 5 End Stage CKD (GFR <15 mL/min/1 73 square meters)  Note: GFR calculation is accurate only with a steady state creatinine    CBC and differential [[id number]]  (Abnormal) Collected:  08/07/20 1447    Lab Status:  Final result Specimen:  Blood from Arm, Right Updated:  08/07/20 1528     WBC 6 70 Thousand/uL      RBC 4 22 Million/uL      Hemoglobin 12 2 g/dL      Hematocrit 36 9 %      MCV 87 fL      MCH 28 8 pg      MCHC 33 0 g/dL      RDW 14 7 %      MPV 8 0 fL      Platelets 479 Thousands/uL      Neutrophils Relative 64 %      Lymphocytes Relative 25 %      Monocytes Relative 8 %      Eosinophils Relative 2 %      Basophils Relative 1 %      Neutrophils Absolute 4 30 Thousands/µL      Lymphocytes Absolute 1 70 Thousands/µL      Monocytes Absolute 0 50 Thousand/µL      Eosinophils Absolute 0 10 Thousand/µL      Basophils Absolute 0 10 Thousands/µL                  XR femur 2 views RIGHT   Final Result by Chaka Mills MD (08/07 1538)      No acute osseous abnormality  Workstation performed: [id number]         XR tibia fibula 2 views RIGHT   Final Result by Chaka Mills MD (08/07 4739)      No acute osseous abnormality  Medial ankle soft tissue swelling        Workstation performed: [id number]         VAS lower limb venous duplex study, unilateral/limited   Final Result by Nicole Juarez MD (08/07 3571) Procedures  Procedures         ED Course       US AUDIT      Most Recent Value   Initial Alcohol Screen: US AUDIT-C    1  How often do you have a drink containing alcohol? 6 Filed at: 08/07/2020 1618   2  How many drinks containing alcohol do you have on a typical day you are drinking? 0 Filed at: 08/07/2020 1618   3a  Male UNDER 65: How often do you have five or more drinks on one occasion? 6 Filed at: 08/07/2020 1618   Audit-C Score  (!) 12 Filed at: 08/07/2020 1618   Full Alcohol Screen: US AUDIT   4  How often during the last year have you found that you were not able to stop drinking once you had started? 0 Filed at: 08/07/2020 1618   5  How often during past year have you failed to do what was normally expected of you because of drinking? 0 Filed at: 08/07/2020 1618   6  How often in past year have you needed a first drink in the morning to get yourself going after a heavy drinking session? 0 Filed at: 08/07/2020 1618   7  How often in past year have you had feeling of guilt or remorse after drinking? 0 Filed at: 08/07/2020 1618   8  How often in past year have you been unable to remember what happened night before because you had been drinking? 0 Filed at: 08/07/2020 1618   9  Have you or someone else been injured as a result of your drinking? 0 Filed at: 08/07/2020 1618   10  Has a relative, friend, doctor or other health worker been concerned about your drinking and suggested you cut down?   0 Filed at: 08/07/2020 1618   AUDIT Total Score  12 Filed at: 08/07/2020 1618                  KEVIN/DAST-10      Most Recent Value   How many times in the past year have you    Used an illegal drug or used a prescription medication for non-medical reasons? Monthly Filed at: 08/07/2020 1536   In the past 12 months      1  Have you used drugs other than those required for medical reasons? 1 Filed at: 08/07/2020 1536   2  Do you use more than one drug at a time? 0 Filed at: 08/07/2020 1536   3  Have you had medical problems as a result of your drug use (e g , memory loss, hepatitis, convulsions, bleeding, etc )? 0 Filed at: 08/07/2020 1536   4  Have you had "blackouts" or "flashbacks" as a result of drug use? YesNo  0 Filed at: 08/07/2020 1536   5  Do you ever feel bad or guilty about your drug use? 0 Filed at: 08/07/2020 1536   6  Does your spouse (or parent) ever complain about your involvement with drugs? 0 Filed at: 08/07/2020 1536   7  Have you neglected your family because of your use of drugs? 0 Filed at: 08/07/2020 1536   8  Have you engaged in illegal activities in order to obtain drugs? 0 Filed at: 08/07/2020 1536   9  Have you ever experienced withdrawal symptoms (felt sick) when you stopped taking drugs? 0 Filed at: 08/07/2020 1536   10  Are you always able to stop using drugs when you want to?  0 Filed at: 08/07/2020 1536   DAST-10 Score  1 Filed at: 08/07/2020 1536                                MDM      Disposition  Final diagnoses:   Sprain of right knee, unspecified ligament, initial encounter     Time reflects when diagnosis was documented in both MDM as applicable and the Disposition within this note     Time User Action Codes Description Comment    8/7/2020  3:54 PM Nilo Merino  Add [S83 [id number]] Sprain of right knee, unspecified ligament, initial encounter       ED Disposition     ED Disposition Condition Date/Time Comment    Discharge Stable Fri Aug 7, 2020  3:55 PM Renetta Carlos discharge to home/self care              Follow-up Information     Follow up With Specialties Details Why Contact Info Additional Information    [address] [facility] [address] [place] [address] Orthopedic Surgery   [address] [place], [place] [address]s [facility] [phone]  [facility] [address] Specialist [address] [place] [address], [address] [place] [address], [id number] [phone]          Discharge Medication List as of 8/7/2020  3:55 PM      CONTINUE these medications which have NOT CHANGED    Details   ACCU-CHEK FASTCLIX LANCETS MISC by Does not apply route daily, Starting Wed 11/28/2018, Normal      ACCU-CHEK GUIDE test strip Historical Med      albuterol (ProAir HFA) 90 mcg/act inhaler Inhale 2 puffs every 4 (four) hours as needed for wheezing or shortness of breath, Starting Wed 5/20/2020, Normal      atorvastatin (LIPITOR) 10 mg tablet Take 1 tablet (10 mg total) by mouth daily, Starting Thu 12/12/2019, Normal      Blood Glucose Monitoring Suppl (ACCU-CHEK GUIDE) w/Device KIT by Does not apply route daily, Starting Wed 11/28/2018, Normal      emtricitabine-tenofovir (TRUVADA) 200-300 mg per tablet Take 1 tablet by mouth every 24 hours, Starting Thu 12/12/2019, Normal      metFORMIN (GLUCOPHAGE) 1000 MG tablet Take 1 tablet (1,000 mg total) by mouth 2 (two) times a day with meals, Starting Thu 7/23/2020, Normal      rivaroxaban (XARELTO) 20 mg tablet Take 1 tablet (20 mg total) by mouth every 24 hours, Starting Thu 12/12/2019, Normal      tamsulosin (FLOMAX) 0 4 mg Take 1 capsule (0 4 mg total) by mouth daily with dinner, Starting Thu 7/23/2020, Normal           No discharge procedures on file      PDMP Review     None          ED Provider  Electronically Signed by           Jaz Morin PA-C  08/07/20 7767

## 2020-08-07 NOTE — Clinical Note
Vahidaleja Schwab was seen and treated in our emergency department on 8/7/2020  Diagnosis:     Joselito Ortega  may return to work on return date  He may return on 08/08/2020  If you have any questions or concerns, please don't hesitate to call        Umu Yu    ______________________________           _______________          _______________  [facility] [place]                              Date                                Time

## 2020-08-07 NOTE — ED NOTES
Patient transported to Xray via stretcher with Nancy Hernandez from radiology        [facility]  08/07/20 2885

## 2020-09-25 ENCOUNTER — OFFICE VISIT (OUTPATIENT)
Dept: FAMILY MEDICINE CLINIC | Facility: CLINIC | Age: 50
End: 2020-09-25

## 2020-09-25 VITALS
DIASTOLIC BLOOD PRESSURE: 84 MMHG | HEART RATE: 88 BPM | RESPIRATION RATE: 16 BRPM | SYSTOLIC BLOOD PRESSURE: 116 MMHG | HEIGHT: 69 IN | WEIGHT: 194 LBS | TEMPERATURE: 98 F | OXYGEN SATURATION: 96 % | BODY MASS INDEX: 28.73 KG/M2

## 2020-09-25 DIAGNOSIS — R76.12 POSITIVE QUANTIFERON-TB GOLD TEST: ICD-10-CM

## 2020-09-25 DIAGNOSIS — Z20.2 EXPOSURE TO STD: Primary | ICD-10-CM

## 2020-09-25 PROCEDURE — 99213 OFFICE O/P EST LOW 20 MIN: CPT | Performed by: FAMILY MEDICINE

## 2020-09-25 PROCEDURE — 87591 N.GONORRHOEAE DNA AMP PROB: CPT | Performed by: FAMILY MEDICINE

## 2020-09-25 PROCEDURE — 87491 CHLMYD TRACH DNA AMP PROBE: CPT | Performed by: FAMILY MEDICINE

## 2020-09-25 NOTE — ASSESSMENT & PLAN NOTE
- [address] regarding this positive result however it seems like this was never reported on the [facility]  - Upon further discussion patient states that between [phone] he thinks he may have been treated for TB for approximately 12 months in [facility]   - Patient to sign a medical release form in order to obtain these records from [facility]    - If there is an issue obtaining these records or patient was inadequately treated patient instructed to go to the [address] [place] [address] 10/12/2020 where there will be availability    - Patient verbalized understanding and is agreeable to the plan

## 2020-09-25 NOTE — PATIENT INSTRUCTIONS
If there are any issues getting the records from [facility] please go to the [facility] October 12th to the TB clinic

## 2020-09-25 NOTE — ASSESSMENT & PLAN NOTE
- HIV, RPR, GC/Chlamydia and hepatitis panel ordered today's office visit   - Counseled on safe sex practices

## 2020-09-25 NOTE — PROGRESS NOTES
Assessment/Plan:    Exposure to STD  - HIV, RPR, GC/Chlamydia and hepatitis panel ordered today's office visit   - Counseled on safe sex practices     Positive QuantiFERON-TB Gold test  - [address] regarding this positive result however it seems like this was never reported on the [facility]  - Upon further discussion patient states that between [phone] he thinks he may have been treated for TB for approximately 12 months in [facility]   - Patient to sign a medical release form in order to obtain these records from [facility]  - If there is an issue obtaining these records or patient was inadequately treated patient instructed to go to the [address] [place] [address] 10/12/2020 where there will be availability    - Patient verbalized understanding and is agreeable to the plan          Diagnoses and all orders for this visit:    Exposure to STD  -     HIV 1/2 Antigen/Antibody (4th Generation) w Reflex [facility]; Future  -     RPR; Future  -     Cancel: Chlamydia/GC amplified DNA by PCR; Future  -     Chronic Hepatitis Panel; Future  -     Chlamydia/GC amplified DNA by PCR; Future  -     Chlamydia/GC amplified DNA by PCR    Positive QuantiFERON-TB Gold test          Subjective:      Patient ID: Devorah Borrero is a 44 y o  male  HPI     Devorah Borrero is a 44year old male with a past medical history of type 2 diabetes, hereditary thrombophilia and asthma who presents today for a same day visit  Today patient is concerned that he may have been exposed to syphilis because his partner tested positive 2 days ago and patient states that their last unprotected sexual encounter was 4-5 days ago  Patient's partner is HIV positive although patient is currently on Truvada  Patient states that he usually does not use condoms with his partner but when he has sex outside of the relationship he will use condoms   Patient denies any symptoms at this present time  Per chart review patient did test positive for syphilis in 2016 and was treated  Of note, on chart review it appears that patient was tested for TB in March after complaining of night sweats and this was positive  Patient also had a chest XR which was negative  Patient was subsequently referred to social and infectious disease however patient states that he has no recollection of being told this and was subsequently lost to follow up  Review of Systems   Constitutional: Negative  HENT: Negative  Eyes: Negative  Respiratory: Negative  Cardiovascular: Negative  Gastrointestinal: Negative  Musculoskeletal: Negative  Skin: Negative  Neurological: Negative  Psychiatric/Behavioral: Negative  Objective:      /84 (BP Location: Right arm, Patient Position: Sitting, Cuff Size: Standard)   Pulse 88   Temp 98 °F (36 7 °C) (Temporal)   Resp 16   Ht 5' 8 8" (1 748 m)   Wt 88 kg (194 lb)   SpO2 96%   BMI 28 82 kg/m²          Physical Exam  Constitutional:       Appearance: Normal appearance  HENT:      Head: Normocephalic and atraumatic  Eyes:      General:         Right eye: No discharge  Left eye: No discharge  Extraocular Movements: Extraocular movements intact  Neck:      Musculoskeletal: Normal range of motion  Cardiovascular:      Rate and Rhythm: Normal rate  Pulmonary:      Effort: Pulmonary effort is normal  No respiratory distress  Musculoskeletal: Normal range of motion  Skin:     Findings: No rash  Neurological:      General: No focal deficit present  Mental Status: He is alert and oriented to person, place, and time     Psychiatric:         Mood and Affect: Mood normal          Behavior: Behavior normal

## 2020-09-28 ENCOUNTER — APPOINTMENT (OUTPATIENT)
Dept: LAB | Facility: HOSPITAL | Age: 50
End: 2020-09-28
Payer: COMMERCIAL

## 2020-09-28 DIAGNOSIS — Z20.2 EXPOSURE TO STD: ICD-10-CM

## 2020-09-28 PROCEDURE — 86803 HEPATITIS C AB TEST: CPT

## 2020-09-28 PROCEDURE — 36415 COLL VENOUS BLD VENIPUNCTURE: CPT

## 2020-09-28 PROCEDURE — 86704 HEP B CORE ANTIBODY TOTAL: CPT

## 2020-09-28 PROCEDURE — 87389 HIV-1 AG W/HIV-1&-2 AB AG IA: CPT

## 2020-09-28 PROCEDURE — 86705 HEP B CORE ANTIBODY IGM: CPT

## 2020-09-28 PROCEDURE — 87340 HEPATITIS B SURFACE AG IA: CPT

## 2020-09-28 PROCEDURE — 86592 SYPHILIS TEST NON-TREP QUAL: CPT

## 2020-09-29 ENCOUNTER — TELEPHONE (OUTPATIENT)
Dept: FAMILY MEDICINE CLINIC | Facility: CLINIC | Age: 50
End: 2020-09-29

## 2020-09-29 LAB
HBV CORE AB SER QL: REACTIVE
HBV CORE IGM SER QL: ABNORMAL
HBV SURFACE AG SER QL: ABNORMAL
HCV AB SER QL: ABNORMAL
RPR SER QL: NORMAL

## 2020-09-30 ENCOUNTER — TELEPHONE (OUTPATIENT)
Dept: FAMILY MEDICINE CLINIC | Facility: CLINIC | Age: 50
End: 2020-09-30

## 2020-09-30 LAB — HIV 1+2 AB+HIV1 P24 AG SERPL QL IA: NORMAL

## 2020-10-01 LAB
C TRACH DNA SPEC QL NAA+PROBE: NEGATIVE
N GONORRHOEA DNA SPEC QL NAA+PROBE: NEGATIVE

## 2020-10-02 ENCOUNTER — APPOINTMENT (EMERGENCY)
Dept: NON INVASIVE DIAGNOSTICS | Facility: HOSPITAL | Age: 50
End: 2020-10-02
Payer: COMMERCIAL

## 2020-10-02 ENCOUNTER — HOSPITAL ENCOUNTER (EMERGENCY)
Facility: HOSPITAL | Age: 50
Discharge: HOME | End: 2020-10-02
Attending: EMERGENCY MEDICINE
Payer: COMMERCIAL

## 2020-10-02 VITALS
HEART RATE: 97 BPM | DIASTOLIC BLOOD PRESSURE: 79 MMHG | OXYGEN SATURATION: 97 % | TEMPERATURE: 98.2 F | WEIGHT: 198.63 LBS | BODY MASS INDEX: 29.5 KG/M2 | SYSTOLIC BLOOD PRESSURE: 119 MMHG | RESPIRATION RATE: 18 BRPM

## 2020-10-02 DIAGNOSIS — M79.602 LEFT ARM PAIN: ICD-10-CM

## 2020-10-02 PROCEDURE — 93971 EXTREMITY STUDY: CPT | Performed by: SURGERY

## 2020-10-02 PROCEDURE — 99284 EMERGENCY DEPT VISIT MOD MDM: CPT | Performed by: EMERGENCY MEDICINE

## 2020-10-02 PROCEDURE — 99284 EMERGENCY DEPT VISIT MOD MDM: CPT

## 2020-10-02 PROCEDURE — 93971 EXTREMITY STUDY: CPT

## 2020-10-02 RX ORDER — CYCLOBENZAPRINE HCL 10 MG
10 TABLET ORAL 2 TIMES DAILY PRN
Qty: 20 TABLET | Refills: 0 | Status: SHIPPED | OUTPATIENT
Start: 2020-10-02 | End: 2020-10-14 | Stop reason: SDUPTHER

## 2020-10-02 RX ORDER — OXYCODONE HYDROCHLORIDE AND ACETAMINOPHEN 5; 325 MG/1; MG/1
2 TABLET ORAL ONCE
Status: COMPLETED | OUTPATIENT
Start: 2020-10-02 | End: 2020-10-02

## 2020-10-02 RX ORDER — NAPROXEN 500 MG/1
500 TABLET ORAL 2 TIMES DAILY WITH MEALS
Qty: 30 TABLET | Refills: 0 | Status: SHIPPED | OUTPATIENT
Start: 2020-10-02 | End: 2021-08-24

## 2020-10-02 RX ADMIN — OXYCODONE HYDROCHLORIDE AND ACETAMINOPHEN 2 TABLET: 5; 325 TABLET ORAL at 16:44

## 2020-10-14 ENCOUNTER — OFFICE VISIT (OUTPATIENT)
Dept: FAMILY MEDICINE CLINIC | Facility: CLINIC | Age: 50
End: 2020-10-14

## 2020-10-14 ENCOUNTER — HOSPITAL ENCOUNTER (OUTPATIENT)
Dept: RADIOLOGY | Facility: HOSPITAL | Age: 50
Discharge: HOME | End: 2020-10-14
Payer: COMMERCIAL

## 2020-10-14 ENCOUNTER — APPOINTMENT (OUTPATIENT)
Dept: LAB | Facility: HOSPITAL | Age: 50
End: 2020-10-14
Payer: COMMERCIAL

## 2020-10-14 VITALS
WEIGHT: 193 LBS | SYSTOLIC BLOOD PRESSURE: 122 MMHG | HEART RATE: 92 BPM | TEMPERATURE: 97 F | BODY MASS INDEX: 28.67 KG/M2 | RESPIRATION RATE: 17 BRPM | DIASTOLIC BLOOD PRESSURE: 70 MMHG | OXYGEN SATURATION: 97 %

## 2020-10-14 DIAGNOSIS — A15.0 TB (PULMONARY TUBERCULOSIS): ICD-10-CM

## 2020-10-14 DIAGNOSIS — J45.20 MILD INTERMITTENT ASTHMA WITHOUT COMPLICATION: ICD-10-CM

## 2020-10-14 DIAGNOSIS — Z20.6 HIV EXPOSURE: ICD-10-CM

## 2020-10-14 DIAGNOSIS — M79.602 LEFT ARM PAIN: ICD-10-CM

## 2020-10-14 DIAGNOSIS — R76.12 POSITIVE QUANTIFERON-TB GOLD TEST: ICD-10-CM

## 2020-10-14 DIAGNOSIS — Z23 NEED FOR VACCINATION: Primary | ICD-10-CM

## 2020-10-14 DIAGNOSIS — N40.1 BENIGN PROSTATIC HYPERPLASIA WITH URINARY FREQUENCY: ICD-10-CM

## 2020-10-14 DIAGNOSIS — D68.59 PROTEIN S DEFICIENCY (HCC): ICD-10-CM

## 2020-10-14 DIAGNOSIS — E11.9 TYPE 2 DIABETES MELLITUS WITHOUT COMPLICATION, WITHOUT LONG-TERM CURRENT USE OF INSULIN (HCC): ICD-10-CM

## 2020-10-14 DIAGNOSIS — R35.0 BENIGN PROSTATIC HYPERPLASIA WITH URINARY FREQUENCY: ICD-10-CM

## 2020-10-14 DIAGNOSIS — Z72.51 HIGH RISK SEXUAL BEHAVIOR, UNSPECIFIED TYPE: ICD-10-CM

## 2020-10-14 LAB
ALBUMIN SERPL BCP-MCNC: 4.2 G/DL (ref 3–5.2)
ALP SERPL-CCNC: 72 U/L (ref 43–122)
ALT SERPL W P-5'-P-CCNC: 28 U/L (ref 9–52)
ANION GAP SERPL CALCULATED.3IONS-SCNC: 9 MMOL/L (ref 5–14)
AST SERPL W P-5'-P-CCNC: 26 U/L (ref 17–59)
BILIRUB SERPL-MCNC: 0.7 MG/DL
BUN SERPL-MCNC: 20 MG/DL (ref 5–25)
CALCIUM SERPL-MCNC: 9.2 MG/DL (ref 8.4–10.2)
CHLORIDE SERPL-SCNC: 97 MMOL/L (ref 97–108)
CO2 SERPL-SCNC: 28 MMOL/L (ref 22–30)
CREAT SERPL-MCNC: 1.26 MG/DL (ref 0.7–1.5)
GFR SERPL CREATININE-BSD FRML MDRD: 75 ML/MIN/1.73SQ M
GLUCOSE SERPL-MCNC: 229 MG/DL (ref 70–99)
POTASSIUM SERPL-SCNC: 4.4 MMOL/L (ref 3.6–5)
PROT SERPL-MCNC: 7.5 G/DL (ref 5.9–8.4)
SL AMB POCT HEMOGLOBIN AIC: 6.8 (ref ?–6.5)
SODIUM SERPL-SCNC: 134 MMOL/L (ref 137–147)

## 2020-10-14 PROCEDURE — 83036 HEMOGLOBIN GLYCOSYLATED A1C: CPT | Performed by: NURSE PRACTITIONER

## 2020-10-14 PROCEDURE — 90682 RIV4 VACC RECOMBINANT DNA IM: CPT

## 2020-10-14 PROCEDURE — 80053 COMPREHEN METABOLIC PANEL: CPT

## 2020-10-14 PROCEDURE — 73030 X-RAY EXAM OF SHOULDER: CPT

## 2020-10-14 PROCEDURE — 86480 TB TEST CELL IMMUN MEASURE: CPT

## 2020-10-14 PROCEDURE — 99214 OFFICE O/P EST MOD 30 MIN: CPT | Performed by: NURSE PRACTITIONER

## 2020-10-14 PROCEDURE — 36415 COLL VENOUS BLD VENIPUNCTURE: CPT

## 2020-10-14 PROCEDURE — 90471 IMMUNIZATION ADMIN: CPT

## 2020-10-14 RX ORDER — EMTRICITABINE AND TENOFOVIR DISOPROXIL FUMARATE 200; 300 MG/1; MG/1
1 TABLET, FILM COATED ORAL EVERY 24 HOURS
Qty: 90 TABLET | Refills: 0 | Status: SHIPPED | OUTPATIENT
Start: 2020-10-14 | End: 2021-02-25 | Stop reason: SDUPTHER

## 2020-10-14 RX ORDER — ALBUTEROL SULFATE 90 UG/1
2 AEROSOL, METERED RESPIRATORY (INHALATION) EVERY 4 HOURS PRN
Qty: 1 INHALER | Refills: 3 | Status: SHIPPED | OUTPATIENT
Start: 2020-10-14

## 2020-10-14 RX ORDER — CYCLOBENZAPRINE HCL 10 MG
10 TABLET ORAL 2 TIMES DAILY PRN
Qty: 20 TABLET | Refills: 0 | Status: SHIPPED | OUTPATIENT
Start: 2020-10-14 | End: 2021-08-24

## 2020-10-14 RX ORDER — PREDNISONE 20 MG/1
40 TABLET ORAL DAILY
Qty: 10 TABLET | Refills: 0 | Status: SHIPPED | OUTPATIENT
Start: 2020-10-14 | End: 2020-10-19

## 2020-10-14 RX ORDER — TAMSULOSIN HYDROCHLORIDE 0.4 MG/1
0.4 CAPSULE ORAL
Qty: 90 CAPSULE | Refills: 1 | Status: SHIPPED | OUTPATIENT
Start: 2020-10-14 | End: 2021-08-24

## 2020-10-14 RX ORDER — ATORVASTATIN CALCIUM 10 MG/1
10 TABLET, FILM COATED ORAL DAILY
Qty: 90 TABLET | Refills: 1 | Status: SHIPPED | OUTPATIENT
Start: 2020-10-14

## 2020-10-16 ENCOUNTER — TELEPHONE (OUTPATIENT)
Dept: FAMILY MEDICINE CLINIC | Facility: CLINIC | Age: 50
End: 2020-10-16

## 2020-10-16 LAB
GAMMA INTERFERON BACKGROUND BLD IA-ACNC: 0.41 IU/ML
M TB IFN-G BLD-IMP: POSITIVE
M TB IFN-G CD4+ BCKGRND COR BLD-ACNC: 3.5 IU/ML
M TB IFN-G CD4+ BCKGRND COR BLD-ACNC: 3.79 IU/ML
MITOGEN IGNF BCKGRD COR BLD-ACNC: >10 IU/ML

## 2020-10-21 ENCOUNTER — OFFICE VISIT (OUTPATIENT)
Dept: OBGYN CLINIC | Facility: MEDICAL CENTER | Age: 50
End: 2020-10-21
Payer: COMMERCIAL

## 2020-10-21 VITALS
HEART RATE: 97 BPM | BODY MASS INDEX: 27.49 KG/M2 | HEIGHT: 70 IN | SYSTOLIC BLOOD PRESSURE: 112 MMHG | WEIGHT: 192 LBS | DIASTOLIC BLOOD PRESSURE: 81 MMHG

## 2020-10-21 DIAGNOSIS — M79.2 RADICULAR PAIN IN LEFT ARM: ICD-10-CM

## 2020-10-21 DIAGNOSIS — M54.12 RADICULOPATHY, CERVICAL REGION: Primary | ICD-10-CM

## 2020-10-21 DIAGNOSIS — M19.012 ARTHRITIS OF LEFT GLENOHUMERAL JOINT: ICD-10-CM

## 2020-10-21 DIAGNOSIS — M25.512 ACUTE PAIN OF LEFT SHOULDER: ICD-10-CM

## 2020-10-21 PROCEDURE — 99204 OFFICE O/P NEW MOD 45 MIN: CPT | Performed by: STUDENT IN AN ORGANIZED HEALTH CARE EDUCATION/TRAINING PROGRAM

## 2020-10-21 RX ORDER — GABAPENTIN 100 MG/1
100 CAPSULE ORAL
Qty: 90 CAPSULE | Refills: 0 | Status: SHIPPED | OUTPATIENT
Start: 2020-10-21 | End: 2021-08-24

## 2020-10-29 ENCOUNTER — EVALUATION (OUTPATIENT)
Dept: PHYSICAL THERAPY | Facility: CLINIC | Age: 50
End: 2020-10-29
Payer: COMMERCIAL

## 2020-10-29 DIAGNOSIS — M54.12 RADICULOPATHY, CERVICAL REGION: Primary | ICD-10-CM

## 2020-10-29 DIAGNOSIS — M79.2 RADICULAR PAIN IN LEFT ARM: ICD-10-CM

## 2020-10-29 PROCEDURE — 97162 PT EVAL MOD COMPLEX 30 MIN: CPT | Performed by: PHYSICAL MEDICINE & REHABILITATION

## 2020-11-05 ENCOUNTER — OFFICE VISIT (OUTPATIENT)
Dept: PHYSICAL THERAPY | Facility: CLINIC | Age: 50
End: 2020-11-05
Payer: COMMERCIAL

## 2020-11-05 DIAGNOSIS — M79.2 RADICULAR PAIN IN LEFT ARM: ICD-10-CM

## 2020-11-05 DIAGNOSIS — M54.12 RADICULOPATHY, CERVICAL REGION: Primary | ICD-10-CM

## 2020-11-05 PROCEDURE — 97112 NEUROMUSCULAR REEDUCATION: CPT

## 2020-11-05 PROCEDURE — 97140 MANUAL THERAPY 1/> REGIONS: CPT

## 2020-11-05 PROCEDURE — 97110 THERAPEUTIC EXERCISES: CPT

## 2020-11-09 ENCOUNTER — OFFICE VISIT (OUTPATIENT)
Dept: PHYSICAL THERAPY | Facility: CLINIC | Age: 50
End: 2020-11-09
Payer: COMMERCIAL

## 2020-11-09 DIAGNOSIS — M54.12 RADICULOPATHY, CERVICAL REGION: Primary | ICD-10-CM

## 2020-11-09 DIAGNOSIS — M79.2 RADICULAR PAIN IN LEFT ARM: ICD-10-CM

## 2020-11-09 PROCEDURE — 97112 NEUROMUSCULAR REEDUCATION: CPT | Performed by: PHYSICAL MEDICINE & REHABILITATION

## 2020-11-09 PROCEDURE — 97140 MANUAL THERAPY 1/> REGIONS: CPT | Performed by: PHYSICAL MEDICINE & REHABILITATION

## 2020-11-12 ENCOUNTER — APPOINTMENT (OUTPATIENT)
Dept: PHYSICAL THERAPY | Facility: CLINIC | Age: 50
End: 2020-11-12
Payer: COMMERCIAL

## 2020-11-16 ENCOUNTER — OFFICE VISIT (OUTPATIENT)
Dept: PHYSICAL THERAPY | Facility: CLINIC | Age: 50
End: 2020-11-16
Payer: COMMERCIAL

## 2020-11-19 ENCOUNTER — APPOINTMENT (OUTPATIENT)
Dept: PHYSICAL THERAPY | Facility: CLINIC | Age: 50
End: 2020-11-19
Payer: COMMERCIAL

## 2020-11-25 ENCOUNTER — APPOINTMENT (OUTPATIENT)
Dept: PHYSICAL THERAPY | Facility: CLINIC | Age: 50
End: 2020-11-25
Payer: COMMERCIAL

## 2020-12-08 PROBLEM — Z71.6 TOBACCO ABUSE COUNSELING: Status: RESOLVED | Noted: 2019-03-01 | Resolved: 2020-12-08

## 2020-12-08 PROBLEM — Z20.2 EXPOSURE TO STD: Status: RESOLVED | Noted: 2020-09-25 | Resolved: 2020-12-08

## 2020-12-08 PROBLEM — R21 RASH IN ADULT: Status: RESOLVED | Noted: 2019-08-28 | Resolved: 2020-12-08

## 2020-12-08 PROBLEM — Z01.818 PRE-OP EXAMINATION: Status: RESOLVED | Noted: 2019-05-10 | Resolved: 2020-12-08

## 2020-12-22 ENCOUNTER — LAB (OUTPATIENT)
Dept: LAB | Facility: HOSPITAL | Age: 50
End: 2020-12-22
Payer: COMMERCIAL

## 2020-12-22 ENCOUNTER — OFFICE VISIT (OUTPATIENT)
Dept: FAMILY MEDICINE CLINIC | Facility: CLINIC | Age: 50
End: 2020-12-22

## 2020-12-22 VITALS
RESPIRATION RATE: 16 BRPM | DIASTOLIC BLOOD PRESSURE: 80 MMHG | TEMPERATURE: 98 F | HEART RATE: 105 BPM | BODY MASS INDEX: 28.63 KG/M2 | OXYGEN SATURATION: 95 % | HEIGHT: 70 IN | WEIGHT: 200 LBS | SYSTOLIC BLOOD PRESSURE: 118 MMHG

## 2020-12-22 DIAGNOSIS — Z72.51 HIGH RISK SEXUAL BEHAVIOR, UNSPECIFIED TYPE: ICD-10-CM

## 2020-12-22 DIAGNOSIS — R31.0 GROSS HEMATURIA: Primary | ICD-10-CM

## 2020-12-22 LAB
HCV AB SER QL: NORMAL
SL AMB  POCT GLUCOSE, UA: NEGATIVE
SL AMB LEUKOCYTE ESTERASE,UA: ABNORMAL
SL AMB POCT BILIRUBIN,UA: NEGATIVE
SL AMB POCT BLOOD,UA: POSITIVE
SL AMB POCT CLARITY,UA: ABNORMAL
SL AMB POCT COLOR,UA: YELLOW
SL AMB POCT KETONES,UA: POSITIVE
SL AMB POCT NITRITE,UA: NEGATIVE
SL AMB POCT PH,UA: 6
SL AMB POCT SPECIFIC GRAVITY,UA: 1.01
SL AMB POCT URINE PROTEIN: ABNORMAL
SL AMB POCT UROBILINOGEN: ABNORMAL

## 2020-12-22 PROCEDURE — 99213 OFFICE O/P EST LOW 20 MIN: CPT | Performed by: PHYSICIAN ASSISTANT

## 2020-12-22 PROCEDURE — 86803 HEPATITIS C AB TEST: CPT

## 2020-12-22 PROCEDURE — 87491 CHLMYD TRACH DNA AMP PROBE: CPT | Performed by: PHYSICIAN ASSISTANT

## 2020-12-22 PROCEDURE — 87591 N.GONORRHOEAE DNA AMP PROB: CPT | Performed by: PHYSICIAN ASSISTANT

## 2020-12-22 PROCEDURE — 86592 SYPHILIS TEST NON-TREP QUAL: CPT

## 2020-12-22 PROCEDURE — 86780 TREPONEMA PALLIDUM: CPT

## 2020-12-22 PROCEDURE — 36415 COLL VENOUS BLD VENIPUNCTURE: CPT

## 2020-12-22 PROCEDURE — 86593 SYPHILIS TEST NON-TREP QUANT: CPT

## 2020-12-22 PROCEDURE — 87389 HIV-1 AG W/HIV-1&-2 AB AG IA: CPT

## 2020-12-22 PROCEDURE — 87086 URINE CULTURE/COLONY COUNT: CPT | Performed by: PHYSICIAN ASSISTANT

## 2020-12-22 PROCEDURE — 81002 URINALYSIS NONAUTO W/O SCOPE: CPT | Performed by: PHYSICIAN ASSISTANT

## 2020-12-23 LAB
C TRACH DNA SPEC QL NAA+PROBE: NEGATIVE
HIV 1+2 AB+HIV1 P24 AG SERPL QL IA: NORMAL
N GONORRHOEA DNA SPEC QL NAA+PROBE: NEGATIVE
RPR SER QL: REACTIVE
RPR SER-TITR: ABNORMAL {TITER}

## 2020-12-24 LAB — BACTERIA UR CULT: NORMAL

## 2020-12-26 DIAGNOSIS — A53.9 SYPHILIS: Primary | ICD-10-CM

## 2020-12-26 LAB — T PALLIDUM AB SER QL IF: REACTIVE

## 2020-12-28 ENCOUNTER — TELEPHONE (OUTPATIENT)
Dept: FAMILY MEDICINE CLINIC | Facility: CLINIC | Age: 50
End: 2020-12-28

## 2020-12-29 ENCOUNTER — TELEPHONE (OUTPATIENT)
Dept: FAMILY MEDICINE CLINIC | Facility: CLINIC | Age: 50
End: 2020-12-29

## 2020-12-30 ENCOUNTER — DOCUMENTATION (OUTPATIENT)
Dept: INFECTIOUS DISEASES | Facility: CLINIC | Age: 50
End: 2020-12-30

## 2021-01-05 ENCOUNTER — TELEPHONE (OUTPATIENT)
Dept: FAMILY MEDICINE CLINIC | Facility: CLINIC | Age: 51
End: 2021-01-05

## 2021-01-05 ENCOUNTER — OFFICE VISIT (OUTPATIENT)
Dept: INFECTIOUS DISEASES | Facility: CLINIC | Age: 51
End: 2021-01-05
Payer: COMMERCIAL

## 2021-01-05 VITALS
DIASTOLIC BLOOD PRESSURE: 84 MMHG | HEIGHT: 70 IN | HEART RATE: 84 BPM | TEMPERATURE: 98.9 F | SYSTOLIC BLOOD PRESSURE: 126 MMHG | BODY MASS INDEX: 29.09 KG/M2 | WEIGHT: 203.2 LBS

## 2021-01-05 DIAGNOSIS — Z72.51 HIGH RISK SEXUAL BEHAVIOR, UNSPECIFIED TYPE: ICD-10-CM

## 2021-01-05 DIAGNOSIS — R31.0 GROSS HEMATURIA: ICD-10-CM

## 2021-01-05 DIAGNOSIS — A53.9 SYPHILIS: Primary | ICD-10-CM

## 2021-01-05 DIAGNOSIS — A53.0 POSITIVE RPR TEST: ICD-10-CM

## 2021-01-05 PROCEDURE — 99215 OFFICE O/P EST HI 40 MIN: CPT | Performed by: INTERNAL MEDICINE

## 2021-01-05 RX ORDER — ISONIAZID 100 MG/1
100 TABLET ORAL DAILY
COMMUNITY

## 2021-01-05 RX ORDER — MULTIVITAMIN WITH IRON
100 TABLET ORAL DAILY
COMMUNITY

## 2021-01-05 NOTE — TELEPHONE ENCOUNTER
John Robertson from [facility] infectious disease called and states the pt will need an out pt referral to be seen do to hematuria    Please advise

## 2021-01-05 NOTE — PROGRESS NOTES
Progress Note - Infectious Disease   Tiffanie Other 44 y o  male MRN: [id number]  Unit/Bed#:  Encounter: [id number]      A/P:  1  Early syphilis  With prior history of syphilis  Prior titer in 2016 was 64 for which patient received 3 doses of IM penicillin  Follow-up RPR was negative in both March and September of 2020  Now most recent RPR is again positive with a titer of 16  Will treat as presumptive early syphilis  - order 1 dose of IM benzathine penicillin 2 4 million units  Will arrange to give dose in our office   - recommend serial RPR to assess for treatment response   - follow-up with health bureau for serial RPRs  2  Hematuria  This may not be secondary to #1  No other associated urinary symptoms  No associated symptomatology to suggest nephrolithiasis  Negative urine culture  - recommend outpatient urology referral for further evaluation of hematuria     3  High risk sexual behavior  Negative recent HIV and GC /chlamydia  I discussed above plan in detail with patient and answered all of his questions  Follow-up in office with provider as needed  Subjective:  Patient is a 61-year-old male with bipolar and schizoaffective disorder who was previously seen by our service in 2016 for management of latent syphilis for which patient received 3 doses of IM benzathine penicillin  RPR titer was 64 at that time  A few weeks ago, patient noticed his urine was blood-tinged prompting him to go to his family physician  Also with mild lower abdominal discomfort  No focal pain  No flank pain  No dysuria, frequency, hesitancy  No history of UTI or kidney stones  Urinalysis did reveal blood  Urine culture showed [id number] mixed contaminants  RPR was positive with a titer of 16  HIV, GC/ Chlamydia were negative  Patient denies any penile discharge or ulcerations  No rashes, joint pains or swelling , fevers or chills  No other focal complaints    He still notices some blood-tinged urine which has not worsened  Patient is sexually active with the same 3 partners for the past 8 years, 2 male and 1 female  They have all recently been tested for STIs and are all negative  Of note, patient also was recently found to have positive quant diff urine for which he is receiving latent TB treatment  Personally reviewed and updated as appropriate:  Past medical history, past social history, past surgical history, current medications, allergies, problem list     Objective:  Vitals:  [unfilled]  [email or web address]@[employer] com: Temperature: 98 9 °F (37 2 °C)    Physical Exam:   General:  No acute distress  HEENT: atraumatic normocephalic , no oral lesions  Psychiatric:  Awake and alert, normal mood and affect  Pulmonary:  Normal respiratory excursion without accessory muscle use  Abdomen:  Soft, nontender, no focal suprapubic or CVA tenderness  Extremities:  No edema  Skin:  No rashes  MSK: no joint swelling  Neuro: moves all extremities spontaneously, no focal neurologic deficit    Lab Results:  I have personally reviewed pertinent labs  12/22/2020 RPR was positive with titer of 1-16  Negative HIV  Negative chlamydia/gonorrhea  Urinalysis showed blood  Urine culture showed [id number] mixed contaminants  Imaging Studies:   I have personally reviewed pertinent imaging study reports and images in PACS  EKG, Pathology, and Other Studies:   I have personally reviewed pertinent reports

## 2021-01-06 DIAGNOSIS — R31.9 HEMATURIA, UNSPECIFIED TYPE: Primary | ICD-10-CM

## 2021-01-06 DIAGNOSIS — R31.0 GROSS HEMATURIA: ICD-10-CM

## 2021-01-06 DIAGNOSIS — R76.12 POSITIVE QUANTIFERON-TB GOLD TEST: ICD-10-CM

## 2021-01-06 DIAGNOSIS — Z20.6 HIV EXPOSURE: ICD-10-CM

## 2021-01-08 ENCOUNTER — TELEPHONE (OUTPATIENT)
Dept: INFECTIOUS DISEASES | Facility: CLINIC | Age: 51
End: 2021-01-08

## 2021-01-11 ENCOUNTER — CLINICAL SUPPORT (OUTPATIENT)
Dept: INFECTIOUS DISEASES | Facility: CLINIC | Age: 51
End: 2021-01-11
Payer: COMMERCIAL

## 2021-01-11 VITALS — TEMPERATURE: 98.6 F

## 2021-01-11 DIAGNOSIS — A52.8 SYPHILIS, LATE LATENT: Primary | ICD-10-CM

## 2021-02-22 DIAGNOSIS — Z20.6 HIV EXPOSURE: Primary | ICD-10-CM

## 2021-02-22 DIAGNOSIS — Z20.6 HIV EXPOSURE: ICD-10-CM

## 2021-02-22 RX ORDER — EMTRICITABINE AND TENOFOVIR DISOPROXIL FUMARATE 200; 300 MG/1; MG/1
TABLET, FILM COATED ORAL
Qty: 90 TABLET | Refills: 0 | OUTPATIENT
Start: 2021-02-22

## 2021-02-22 NOTE — TELEPHONE ENCOUNTER
pt informed states he will go tomorrow morning he did mention he did have the HIV lab done in December

## 2021-02-23 ENCOUNTER — LAB (OUTPATIENT)
Dept: LAB | Facility: HOSPITAL | Age: 51
End: 2021-02-23
Payer: COMMERCIAL

## 2021-02-23 DIAGNOSIS — Z20.6 HIV EXPOSURE: ICD-10-CM

## 2021-02-23 LAB
ALBUMIN SERPL BCP-MCNC: 4.4 G/DL (ref 3–5.2)
ALP SERPL-CCNC: 71 U/L (ref 43–122)
ALT SERPL W P-5'-P-CCNC: 27 U/L (ref 9–52)
ANION GAP SERPL CALCULATED.3IONS-SCNC: 5 MMOL/L (ref 5–14)
AST SERPL W P-5'-P-CCNC: 31 U/L (ref 17–59)
BILIRUB SERPL-MCNC: 0.8 MG/DL
BUN SERPL-MCNC: 16 MG/DL (ref 5–25)
CALCIUM SERPL-MCNC: 9.6 MG/DL (ref 8.4–10.2)
CHLORIDE SERPL-SCNC: 101 MMOL/L (ref 97–108)
CO2 SERPL-SCNC: 31 MMOL/L (ref 22–30)
CREAT SERPL-MCNC: 1.18 MG/DL (ref 0.7–1.5)
GFR SERPL CREATININE-BSD FRML MDRD: 82 ML/MIN/1.73SQ M
GLUCOSE P FAST SERPL-MCNC: 227 MG/DL (ref 70–99)
POTASSIUM SERPL-SCNC: 4.5 MMOL/L (ref 3.6–5)
PROT SERPL-MCNC: 7.7 G/DL (ref 5.9–8.4)
SODIUM SERPL-SCNC: 137 MMOL/L (ref 137–147)

## 2021-02-23 PROCEDURE — 36415 COLL VENOUS BLD VENIPUNCTURE: CPT

## 2021-02-23 PROCEDURE — 80053 COMPREHEN METABOLIC PANEL: CPT

## 2021-02-23 PROCEDURE — 87389 HIV-1 AG W/HIV-1&-2 AB AG IA: CPT

## 2021-02-24 LAB — HIV 1+2 AB+HIV1 P24 AG SERPL QL IA: NORMAL

## 2021-02-25 DIAGNOSIS — Z20.6 HIV EXPOSURE: ICD-10-CM

## 2021-02-25 RX ORDER — EMTRICITABINE AND TENOFOVIR DISOPROXIL FUMARATE 200; 300 MG/1; MG/1
1 TABLET, FILM COATED ORAL EVERY 24 HOURS
Qty: 90 TABLET | Refills: 0 | Status: SHIPPED | OUTPATIENT
Start: 2021-02-25

## 2021-02-25 RX ORDER — EMTRICITABINE AND TENOFOVIR DISOPROXIL FUMARATE 200; 300 MG/1; MG/1
TABLET, FILM COATED ORAL
Qty: 90 TABLET | Refills: 0 | OUTPATIENT
Start: 2021-02-25

## 2021-03-10 ENCOUNTER — TELEPHONE (OUTPATIENT)
Dept: FAMILY MEDICINE CLINIC | Facility: CLINIC | Age: 51
End: 2021-03-10

## 2021-05-25 ENCOUNTER — HOSPITAL ENCOUNTER (EMERGENCY)
Facility: HOSPITAL | Age: 51
Discharge: HOME | End: 2021-05-25
Attending: EMERGENCY MEDICINE
Payer: COMMERCIAL

## 2021-05-25 ENCOUNTER — APPOINTMENT (EMERGENCY)
Dept: RADIOLOGY | Facility: HOSPITAL | Age: 51
End: 2021-05-25
Payer: COMMERCIAL

## 2021-05-25 VITALS
HEIGHT: 70 IN | HEART RATE: 76 BPM | DIASTOLIC BLOOD PRESSURE: 81 MMHG | SYSTOLIC BLOOD PRESSURE: 149 MMHG | WEIGHT: 200 LBS | BODY MASS INDEX: 28.63 KG/M2 | OXYGEN SATURATION: 100 % | TEMPERATURE: 97.9 F | RESPIRATION RATE: 16 BRPM

## 2021-05-25 DIAGNOSIS — N12 PYELONEPHRITIS: Primary | ICD-10-CM

## 2021-05-25 LAB
ALBUMIN SERPL BCP-MCNC: 3.8 G/DL (ref 3.5–5)
ALP SERPL-CCNC: 88 U/L (ref 46–116)
ALT SERPL W P-5'-P-CCNC: 30 U/L (ref 12–78)
ANION GAP SERPL CALCULATED.3IONS-SCNC: 5 MMOL/L (ref 4–13)
AST SERPL W P-5'-P-CCNC: 26 U/L (ref 5–45)
BACTERIA UR QL AUTO: ABNORMAL /HPF
BASOPHILS # BLD AUTO: 0.04 THOUSANDS/ΜL (ref 0–0.1)
BASOPHILS NFR BLD AUTO: 1 % (ref 0–1)
BILIRUB SERPL-MCNC: 0.87 MG/DL (ref 0.2–1)
BILIRUB UR QL STRIP: ABNORMAL
BUN SERPL-MCNC: 11 MG/DL (ref 5–25)
CALCIUM SERPL-MCNC: 9.4 MG/DL (ref 8.3–10.1)
CHLORIDE SERPL-SCNC: 106 MMOL/L (ref 100–108)
CLARITY UR: CLEAR
CO2 SERPL-SCNC: 27 MMOL/L (ref 21–32)
COLOR UR: ABNORMAL
CREAT SERPL-MCNC: 1.48 MG/DL (ref 0.6–1.3)
EOSINOPHIL # BLD AUTO: 0.05 THOUSAND/ΜL (ref 0–0.61)
EOSINOPHIL NFR BLD AUTO: 1 % (ref 0–6)
ERYTHROCYTE [DISTWIDTH] IN BLOOD BY AUTOMATED COUNT: 13.2 % (ref 11.6–15.1)
GFR SERPL CREATININE-BSD FRML MDRD: 62 ML/MIN/1.73SQ M
GLUCOSE SERPL-MCNC: 162 MG/DL (ref 65–140)
GLUCOSE UR STRIP-MCNC: NEGATIVE MG/DL
HCT VFR BLD AUTO: 38.8 % (ref 36.5–49.3)
HGB BLD-MCNC: 12.3 G/DL (ref 12–17)
HGB UR QL STRIP.AUTO: NEGATIVE
HYALINE CASTS #/AREA URNS LPF: ABNORMAL /LPF
IMM GRANULOCYTES # BLD AUTO: 0.03 THOUSAND/UL (ref 0–0.2)
IMM GRANULOCYTES NFR BLD AUTO: 0 % (ref 0–2)
KETONES UR STRIP-MCNC: NEGATIVE MG/DL
LEUKOCYTE ESTERASE UR QL STRIP: NEGATIVE
LIPASE SERPL-CCNC: 52 U/L (ref 73–393)
LYMPHOCYTES # BLD AUTO: 2.01 THOUSANDS/ΜL (ref 0.6–4.47)
LYMPHOCYTES NFR BLD AUTO: 28 % (ref 14–44)
MCH RBC QN AUTO: 28 PG (ref 26.8–34.3)
MCHC RBC AUTO-ENTMCNC: 31.7 G/DL (ref 31.4–37.4)
MCV RBC AUTO: 88 FL (ref 82–98)
MONOCYTES # BLD AUTO: 0.66 THOUSAND/ΜL (ref 0.17–1.22)
MONOCYTES NFR BLD AUTO: 9 % (ref 4–12)
NEUTROPHILS # BLD AUTO: 4.39 THOUSANDS/ΜL (ref 1.85–7.62)
NEUTS SEG NFR BLD AUTO: 61 % (ref 43–75)
NITRITE UR QL STRIP: NEGATIVE
NON-SQ EPI CELLS URNS QL MICRO: ABNORMAL /HPF
NRBC BLD AUTO-RTO: 0 /100 WBCS
PH UR STRIP.AUTO: 5.5 [PH] (ref 4.5–8)
PLATELET # BLD AUTO: 335 THOUSANDS/UL (ref 149–390)
PMV BLD AUTO: 9.1 FL (ref 8.9–12.7)
POTASSIUM SERPL-SCNC: 4.2 MMOL/L (ref 3.5–5.3)
PROT SERPL-MCNC: 7.7 G/DL (ref 6.4–8.2)
PROT UR STRIP-MCNC: ABNORMAL MG/DL
RBC # BLD AUTO: 4.39 MILLION/UL (ref 3.88–5.62)
RBC #/AREA URNS AUTO: ABNORMAL /HPF
SODIUM SERPL-SCNC: 138 MMOL/L (ref 136–145)
SP GR UR STRIP.AUTO: >=1.03 (ref 1–1.03)
UROBILINOGEN UR QL STRIP.AUTO: 0.2 E.U./DL
WBC # BLD AUTO: 7.18 THOUSAND/UL (ref 4.31–10.16)
WBC #/AREA URNS AUTO: ABNORMAL /HPF

## 2021-05-25 PROCEDURE — 96361 HYDRATE IV INFUSION ADD-ON: CPT

## 2021-05-25 PROCEDURE — 36415 COLL VENOUS BLD VENIPUNCTURE: CPT | Performed by: EMERGENCY MEDICINE

## 2021-05-25 PROCEDURE — 87491 CHLMYD TRACH DNA AMP PROBE: CPT | Performed by: EMERGENCY MEDICINE

## 2021-05-25 PROCEDURE — G1004 CDSM NDSC: HCPCS

## 2021-05-25 PROCEDURE — 83690 ASSAY OF LIPASE: CPT | Performed by: EMERGENCY MEDICINE

## 2021-05-25 PROCEDURE — 74177 CT ABD & PELVIS W/CONTRAST: CPT

## 2021-05-25 PROCEDURE — 87086 URINE CULTURE/COLONY COUNT: CPT

## 2021-05-25 PROCEDURE — 96375 TX/PRO/DX INJ NEW DRUG ADDON: CPT

## 2021-05-25 PROCEDURE — 85025 COMPLETE CBC W/AUTO DIFF WBC: CPT | Performed by: EMERGENCY MEDICINE

## 2021-05-25 PROCEDURE — 99284 EMERGENCY DEPT VISIT MOD MDM: CPT | Performed by: EMERGENCY MEDICINE

## 2021-05-25 PROCEDURE — 80053 COMPREHEN METABOLIC PANEL: CPT | Performed by: EMERGENCY MEDICINE

## 2021-05-25 PROCEDURE — 99284 EMERGENCY DEPT VISIT MOD MDM: CPT

## 2021-05-25 PROCEDURE — 87591 N.GONORRHOEAE DNA AMP PROB: CPT | Performed by: EMERGENCY MEDICINE

## 2021-05-25 PROCEDURE — 81001 URINALYSIS AUTO W/SCOPE: CPT

## 2021-05-25 PROCEDURE — 96374 THER/PROPH/DIAG INJ IV PUSH: CPT

## 2021-05-25 RX ORDER — CEPHALEXIN 250 MG/1
500 CAPSULE ORAL ONCE
Status: DISCONTINUED | OUTPATIENT
Start: 2021-05-25 | End: 2021-05-25

## 2021-05-25 RX ORDER — ONDANSETRON 4 MG/1
4 TABLET, ORALLY DISINTEGRATING ORAL EVERY 6 HOURS PRN
Qty: 20 TABLET | Refills: 0 | Status: SHIPPED | OUTPATIENT
Start: 2021-05-25 | End: 2021-08-24

## 2021-05-25 RX ORDER — CIPROFLOXACIN 500 MG/1
500 TABLET, FILM COATED ORAL ONCE
Status: COMPLETED | OUTPATIENT
Start: 2021-05-25 | End: 2021-05-25

## 2021-05-25 RX ORDER — ONDANSETRON 2 MG/ML
4 INJECTION INTRAMUSCULAR; INTRAVENOUS ONCE
Status: COMPLETED | OUTPATIENT
Start: 2021-05-25 | End: 2021-05-25

## 2021-05-25 RX ORDER — KETOROLAC TROMETHAMINE 30 MG/ML
15 INJECTION, SOLUTION INTRAMUSCULAR; INTRAVENOUS ONCE
Status: COMPLETED | OUTPATIENT
Start: 2021-05-25 | End: 2021-05-25

## 2021-05-25 RX ORDER — CIPROFLOXACIN 500 MG/1
500 TABLET, FILM COATED ORAL EVERY 12 HOURS SCHEDULED
Qty: 18 TABLET | Refills: 0 | Status: SHIPPED | OUTPATIENT
Start: 2021-05-26 | End: 2021-06-04

## 2021-05-25 RX ADMIN — CIPROFLOXACIN HYDROCHLORIDE 500 MG: 500 TABLET, FILM COATED ORAL at 17:05

## 2021-05-25 RX ADMIN — ONDANSETRON 4 MG: 2 INJECTION INTRAMUSCULAR; INTRAVENOUS at 15:29

## 2021-05-25 RX ADMIN — KETOROLAC TROMETHAMINE 15 MG: 30 INJECTION, SOLUTION INTRAMUSCULAR at 15:29

## 2021-05-25 RX ADMIN — SODIUM CHLORIDE 1000 ML: 0.9 INJECTION, SOLUTION INTRAVENOUS at 16:11

## 2021-05-25 RX ADMIN — IOHEXOL 100 ML: 350 INJECTION, SOLUTION INTRAVENOUS at 16:10

## 2021-05-25 NOTE — ED ATTENDING ATTESTATION
5/25/2021  IMaisha MD, saw and evaluated the patient  I have discussed the patient with the resident/non-physician practitioner and agree with the resident's/non-physician practitioner's findings, Plan of Care, and MDM as documented in the resident's/non-physician practitioner's note, except where noted  All available labs and Radiology studies were reviewed  I was present for key portions of any procedure(s) performed by the resident/non-physician practitioner and I was immediately available to provide assistance  At this point I agree with the current assessment done in the Emergency Department  I have conducted an independent evaluation of this patient a history and physical is as follows:    Patient presents to the emergency department with a history of left lower quadrant abdominal pain that began approximately 30 minutes prior to arrival   The patient reports he was previously in his normal state of good health when he developed the sudden onset of left lower quadrant pain  That pain has persisted, waxing waning in intensity, since then  Patient rates the pain as 5/10 now  The patient admits to nausea and 2 episodes of vomiting prior to arrival   The patient denies any diarrhea  The patient does admit to hematuria over the last several months  The patient denies any weakness, paresthesias loss of bowel or bladder function, back pain, or other symptoms  Physical exam demonstrates a male who appears to be comfortable  Oral mucosa is moist   HEENT exam is normal   Lungs are clear with equal breath sounds  The heart had a regular rate rhythm  The abdomen is soft with mild to moderate tenderness in the left lower quadrant  Remainder of the abdomen is nontender  There is no rebound or guarding  No masses were appreciated  The back was nontender  Extremities are symmetric and nontender  There is no focal neurologic deficit      ED Course         Critical Care Time  Procedures

## 2021-05-25 NOTE — ED NOTES
Patient transported to [address], [address] [place] [address]  05/25/21 3049

## 2021-05-26 LAB
C TRACH DNA SPEC QL NAA+PROBE: NEGATIVE
N GONORRHOEA DNA SPEC QL NAA+PROBE: NEGATIVE

## 2021-05-27 LAB — BACTERIA UR CULT: ABNORMAL

## 2021-05-27 NOTE — ED PROVIDER NOTES
History  Chief Complaint   Patient presents with    Abdominal Pain     Patient reports LLQ abdominal pain starting within the last hour; reports nausea and vomiting     Patient is a 59-year-old male with history of diabetes mellitus that presents for evaluation left lower quadrant abdominal pain  Patient says that about 1/2 hour prior to ED arrival, he had the acute onset of left-sided abdominal pain  He describes in his left lower quadrant and is sharp/stabbing in nature  It has gotten slightly better since that time  He endorses nausea and vomited twice prior to ED arrival   Emesis was nonbloody, nonbilious  Patient has not taken anything for the pain  She does report that he has been having some visible hematuria for the past several months which she is following up with Urology about  He also has been diagnosed with syphilis in the past   Recently checked for HIV/gonorrhea/chlamydia and was negative several weeks ago  Patient says that he always uses protection and has sex with both men and women  Patient denies testicular erythema, swelling, pain  Patient denies bowel symptoms including diarrhea, melena hematochezia  He denies chest pain, dyspnea  Prior to Admission Medications   Prescriptions Last Dose Informant Patient Reported? Taking?    ACCU-CHEK FASTCLIX LANCETS MISC   No No   Sig: by Does not apply route daily   ACCU-CHEK GUIDE test strip   Yes No   Blood Glucose Monitoring Suppl (ACCU-CHEK GUIDE) w/Device KIT   No No   Sig: by Does not apply route daily   albuterol (ProAir HFA) 90 mcg/act inhaler   No No   Sig: Inhale 2 puffs every 4 (four) hours as needed for wheezing or shortness of breath   atorvastatin (LIPITOR) 10 mg tablet   No No   Sig: Take 1 tablet (10 mg total) by mouth daily   cyclobenzaprine (FLEXERIL) 10 mg tablet   No No   Sig: Take 1 tablet (10 mg total) by mouth 2 (two) times a day as needed (arm pain)   diclofenac sodium (VOLTAREN) 1 %   No No   Sig: Apply 2 g topically 4 (four) times a day   Patient not taking: Reported on 1/5/2021   emtricitabine-tenofovir (Truvada) 200-300 mg per tablet   No No   Sig: Take 1 tablet by mouth every 24 hours   gabapentin (NEURONTIN) 100 mg capsule   No No   Sig: Take 1 capsule (100 mg total) by mouth daily at bedtime   Patient not taking: Reported on 1/5/2021   isoniazid (NYDRAZID) 100 mg tablet  Self Yes No   Sig: Take 100 mg by mouth daily   metFORMIN (GLUCOPHAGE) 1000 MG tablet   No No   Sig: Take 1 tablet (1,000 mg total) by mouth 2 (two) times a day with meals   naproxen (NAPROSYN) 500 mg tablet   No No   Sig: Take 1 tablet (500 mg total) by mouth 2 (two) times a day with meals   Patient not taking: Reported on 1/5/2021   pyridoxine (VITAMIN B6) 100 mg tablet  Self Yes No   Sig: Take 100 mg by mouth daily   rivaroxaban (Xarelto) 20 mg tablet   No No   Sig: Take 1 tablet (20 mg total) by mouth every 24 hours   tamsulosin (FLOMAX) 0 4 mg   No No   Sig: Take 1 capsule (0 4 mg total) by mouth daily with dinner      Facility-Administered Medications: None       Past Medical History:   Diagnosis Date    Anemia     Anxiety     Arthritis     Asthma     Blood clot in vein 2017    right leg    Blood disorder     Chronic pain disorder     good control    Depression     Diabetes mellitus ([facility] )     type 2    Enlarged prostate     [facility]     past, last was 1 year ago    Hypertension     Psychiatric disorder     bipolar    Psychiatric illness     Psychosis ([facility] )     Sleep apnea     snores but hasn't been tested    Vertigo        Past Surgical History:   Procedure Laterality Date    CYST REMOVAL      ESOPHAGOGASTRODUODENOSCOPY N/A 11/9/2016    Procedure: ESOPHAGOGASTRODUODENOSCOPY (EGD); Surgeon: Marelyn Hammans, MD;  Location: [facility]; Service:     ESOPHAGOGASTRODUODENOSCOPY N/A 9/2/2016    Procedure: ESOPHAGOGASTRODUODENOSCOPY (EGD); Surgeon: Marelyn Hammans, MD;  Location[facility];   Service:    [address] Family History   Problem Relation Age of Onset    No Known Problems Mother     Colon cancer Father 61    No Known Problems Sister     No Known Problems Brother     No Known Problems Maternal Aunt     No Known Problems Paternal Aunt     No Known Problems Maternal Uncle     No Known Problems Paternal Uncle     No Known Problems Maternal Grandfather     No Known Problems Maternal Grandmother     No Known Problems Paternal Grandfather     Hypertension Paternal Grandmother     No Known Problems Cousin     Prostate cancer Family     Stroke Family     Diabetes Family         MELLITUS    Coronary artery disease Family      I have reviewed and agree with the history as documented  E-Cigarette/Vaping    E-Cigarette Use Never User      E-Cigarette/Vaping Substances    Nicotine No     THC No     CBD No     Flavoring No     Other No     Unknown No      Social History     Tobacco Use    Smoking status: Current Some Day Smoker     Packs/day: 0 20     Years: 15 00     Pack years: 3 00     Types: Cigarettes    Smokeless tobacco: Current User   Substance Use Topics    Alcohol use: Yes     Alcohol/week: 12 0 standard drinks     Types: 12 Cans of beer per week     Frequency: Monthly or less    Drug use: Yes     Types: Marijuana        Review of Systems   Constitutional: Negative for fever  HENT: Negative for sore throat  Eyes: Negative for photophobia  Respiratory: Negative for shortness of breath  Cardiovascular: Negative for chest pain  Gastrointestinal: Positive for abdominal pain, nausea and vomiting  Genitourinary: Negative for dysuria  Musculoskeletal: Negative for back pain  Skin: Negative for rash  Neurological: Negative for light-headedness  Hematological: Negative for adenopathy  Psychiatric/Behavioral: Negative for agitation  All other systems reviewed and are negative        Physical Exam  ED Triage Vitals [05/25/21 1438]   Temperature Pulse Respirations Blood Pressure SpO2   97 9 °F (36 6 °C) 69 16 143/73 98 %      Temp Source Heart Rate Source Patient Position - Orthostatic VS BP Location FiO2 (%)   Oral Monitor Lying Right arm --      Pain Score       7             Orthostatic Vital Signs  Vitals:    05/25/21 1438 05/25/21 1700   BP: 143/73 149/81   Pulse: 69 76   Patient Position - Orthostatic VS: Lying Lying       Physical Exam  Vitals signs reviewed  Constitutional:       General: He is not in acute distress  Appearance: He is well-developed  HENT:      Head: Normocephalic  Eyes:      Pupils: Pupils are equal, round, and reactive to light  Neck:      Musculoskeletal: Normal range of motion and neck supple  Cardiovascular:      Rate and Rhythm: Normal rate and regular rhythm  Heart sounds: Normal heart sounds  No murmur  No friction rub  No gallop  Pulmonary:      Effort: Pulmonary effort is normal       Breath sounds: Normal breath sounds  Abdominal:      General: Bowel sounds are normal  There is no distension  Palpations: Abdomen is soft  Tenderness: There is abdominal tenderness  There is no guarding  Comments: Moderate left lower tenderness without rebound tenderness or guarding  Bowel sounds normal    Genitourinary:     Comments: Testicles are non erythematous, non swollen  No tenderness to palpation bilaterally  Cremasteric reflex intact  Musculoskeletal: Normal range of motion  Skin:     Capillary Refill: Capillary refill takes less than 2 seconds  Neurological:      Mental Status: He is alert and oriented to person, place, and time  Cranial Nerves: No cranial nerve deficit  Sensory: No sensory deficit  Motor: No abnormal muscle tone  Psychiatric:         Behavior: Behavior normal          Thought Content:  Thought content normal          Judgment: Judgment normal          ED Medications  Medications   ketorolac (TORADOL) injection 15 mg (15 mg Intravenous Given 5/25/21 1529)   [facility]ansetron [facility]) injection 4 mg (4 mg Intravenous Given 5/25/21 1529)   sodium chloride 0 9 % bolus 1,000 mL (0 mL Intravenous Stopped 5/25/21 1653)   iohexol (OMNIPAQUE) 350 MG/ML injection (SINGLE-DOSE) 100 mL (100 mL Intravenous Given 5/25/21 1610)   ciprofloxacin (CIPRO) tablet 500 mg (500 mg Oral Given 5/25/21 1705)       Diagnostic Studies  Results Reviewed     Procedure Component Value Units Date/Time    Urine culture [[id number]]  (Abnormal)  (Susceptibility) Collected: 05/25/21 1504    Lab Status: Final result Specimen: Urine, Clean Catch Updated: 05/27/21 1504     Urine Culture 10,000-19,000 cfu/ml Alpha Hemolytic Streptococcus NOT Enterococcus    Susceptibility     Alpha Hemolytic Streptococcus NOT Enterococcus (1)     Antibiotic Interpretation Microscan Method Status    ZID Performed  Yes STUART Final                   Chlamydia/GC amplified DNA by PCR [[id number]]  (Normal) Collected: 05/25/21 1548    Lab Status: Final result Specimen: Urine, Other Updated: 05/26/21 0646     N gonorrhoeae, DNA Probe Negative     Chlamydia trachomatis, DNA Probe Negative    Narrative:      Test performed using PCR amplification of target DNA  This test is intended as an aid in the diagnosis of Chlamydial and gonococcal disease  This test has not been evaluated in patients younger than 15years of age and is not recommended for evaluation of suspected sexual abuse  Additional testing is recommended when the results do not correlate with clinical signs and symptoms        Urine Microscopic [[id number]]  (Abnormal) Collected: 05/25/21 1504    Lab Status: Final result Specimen: Urine, Clean Catch Updated: 05/25/21 1544     RBC, UA 4-10 /hpf      WBC, UA 10-20 /hpf      Epithelial Cells Occasional /hpf      Bacteria, UA Occasional /hpf      Hyaline Casts, UA 5-10 /lpf     CMP [[id number]]  (Abnormal) Collected: 05/25/21 1456    Lab Status: Final result Specimen: Blood from Arm, Right Updated: 05/25/21 1533     Sodium 138 mmol/L Potassium 4 2 mmol/L      Chloride 106 mmol/L      CO2 27 mmol/L      ANION GAP 5 mmol/L      BUN 11 mg/dL      Creatinine 1 48 mg/dL      Glucose 162 mg/dL      Calcium 9 4 mg/dL      AST 26 U/L      ALT 30 U/L      Alkaline Phosphatase 88 U/L      Total Protein 7 7 g/dL      Albumin 3 8 g/dL      Total Bilirubin 0 87 mg/dL      eGFR 62 ml/min/1 73sq m     Narrative:      National Kidney Disease Foundation guidelines for Chronic Kidney Disease (CKD):     Stage 1 with normal or high GFR (GFR > 90 mL/min/1 73 square meters)    Stage 2 Mild CKD (GFR = 60-89 mL/min/1 73 square meters)    Stage 3A Moderate CKD (GFR = 45-59 mL/min/1 73 square meters)    Stage 3B Moderate CKD (GFR = 30-44 mL/min/1 73 square meters)    Stage 4 Severe CKD (GFR = 15-29 mL/min/1 73 square meters)    Stage 5 End Stage CKD (GFR <15 mL/min/1 73 square meters)  Note: GFR calculation is accurate only with a steady state creatinine    Lipase [[id number]]  (Abnormal) Collected: 05/25/21 1456    Lab Status: Final result Specimen: Blood from Arm, Right Updated: 05/25/21 1533     Lipase 52 u/L     CBC and differential [[id number]] Collected: 05/25/21 1456    Lab Status: Final result Specimen: Blood from Arm, Right Updated: 05/25/21 1509     WBC 7 18 Thousand/uL      RBC 4 39 Million/uL      Hemoglobin 12 3 g/dL      Hematocrit 38 8 %      MCV 88 fL      MCH 28 0 pg      MCHC 31 7 g/dL      RDW 13 2 %      MPV 9 1 fL      Platelets 539 Thousands/uL      nRBC 0 /100 WBCs      Neutrophils Relative 61 %      Immat GRANS % 0 %      Lymphocytes Relative 28 %      Monocytes Relative 9 %      Eosinophils Relative 1 %      Basophils Relative 1 %      Neutrophils Absolute 4 39 Thousands/µL      Immature Grans Absolute 0 03 Thousand/uL      Lymphocytes Absolute 2 01 Thousands/µL      Monocytes Absolute 0 66 Thousand/µL      Eosinophils Absolute 0 05 Thousand/µL      Basophils Absolute 0 04 Thousands/µL     Urine Macroscopic, POC [[id number]]  (Abnormal) Collected: 05/25/21 1504    Lab Status: Final result Specimen: Urine Updated: 05/25/21 1506     Color, UA Lisha     Clarity, UA Clear     pH, UA 5 5     Leukocytes, UA Negative     Nitrite, UA Negative     Protein,  (2+) mg/dl      Glucose, UA Negative mg/dl      Ketones, UA Negative mg/dl      Urobilinogen, UA 0 2 E U /dl      Bilirubin, UA Interference- unable to analyze     Blood, UA Negative     Specific Gravity, UA >=1 030    Narrative:      CLINITEK RESULT                 CT Abdomen pelvis with contrast   Final Result by Marium Barker MD (05/25 1620)      No acute intra-abdominal pathology  Specifically, no source of left lower quadrant pain identified  Uncomplicated colonic diverticulosis  Workstation performed: [id number]               Procedures  Procedures      ED Course  ED Course as of May 28 1503   Tue May 25, 2021   1653 Patient re-evaluated at this time, reports significant improvement in abdominal pain with minimal tenderness on exam   Patient again denies testicular symptoms  Will discharge with antibiotics for presumed pyelonephritis  1708 Baseline 1 1-1 2, given 1 L NS     Creatinine(!): 1 48                                       MDM  Number of Diagnoses or Management Options  Pyelonephritis:   Diagnosis management comments: Patient is a 67-year-old male who presents for evaluation of left lower quadrant abdominal pain, nausea vomiting  Patient with some tenderness on abdominal exam   Workup reveals likely pyelonephritis  No evidence of abnormality on CT abdomen pelvis  Urinalysis has significant wbc's and bacteria  Gonorrhea/chlamydia sent as the patient does have some high risk sexual behavior  Will treat with ciprofloxacin for pyelonephritis in a male  Patient was instructed to follow-up with urology         Disposition  Final diagnoses:   Pyelonephritis     Time reflects when diagnosis was documented in both MDM as applicable and the Disposition within this note     Time User Action Codes Description Comment    5/25/2021  4:48 PM Rosalba Clonts Add [N12] Pyelonephritis       ED Disposition     ED Disposition Condition Date/Time Comment    Discharge Stable Tue May 25, 2021  4:46 PM Alyson Frederick discharge to home/self care              Follow-up Information     Follow up With Specialties Details Why Contact Info Additional [address] Emergency Department Emergency Medicine  If symptoms worsen [address]  [address] [facility] [address], [address], [place], [facility] [address]    [facility] Schedule an appointment as soon as possible for a visit  If symptoms worsen [address] [facility] [id number]  [address]  [facility] [place], [address], [place], [address]    [facility], [address] [facility], Nurse Practitioner Schedule an appointment as soon as possible for a visit in 2 days For symptom recheck [address] [facility] [address]  [phone]             Discharge Medication List as of 5/25/2021  4:56 PM      START taking these medications    Details   ciprofloxacin (CIPRO) 500 mg tablet Take 1 tablet (500 mg total) by mouth every 12 (twelve) hours for 9 days, Starting Wed 5/26/2021, Until Fri 6/4/2021, Normal         CONTINUE these medications which have NOT CHANGED    Details   ACCU-CHEK FASTCLIX LANCETS [address] [facility] by Does not apply route daily, Starting Wed 11/28/2018, Normal      ACCU-CHEK GUIDE test strip Historical Med      albuterol (ProAir HFA) 90 mcg/act inhaler Inhale 2 puffs every 4 (four) hours as needed for wheezing or shortness of breath, Starting Wed 10/14/2020, Normal      atorvastatin (LIPITOR) 10 mg tablet Take 1 tablet (10 mg total) by mouth daily, Starting Wed 10/14/2020, Normal      Blood Glucose Monitoring Suppl (ACCU-CHEK GUIDE) w/Device KIT by Does not apply route daily, Starting Wed 11/28/2018, Normal      cyclobenzaprine (FLEXERIL) 10 mg tablet Take 1 tablet (10 mg total) by mouth 2 (two) times a day as needed (arm pain), Starting Wed 10/14/2020, Normal      diclofenac sodium (VOLTAREN) 1 % Apply 2 g topically 4 (four) times a day, Starting Wed 10/14/2020, Normal      emtricitabine-tenofovir (Truvada) 200-300 mg per tablet Take 1 tablet by mouth every 24 hours, Starting Thu 2/25/2021, Normal      gabapentin (NEURONTIN) 100 mg capsule Take 1 capsule (100 mg total) by mouth daily at bedtime, Starting Wed 10/21/2020, Normal      isoniazid (NYDRAZID) 100 mg tablet Take 100 mg by mouth daily, Historical Med      metFORMIN (GLUCOPHAGE) 1000 MG tablet Take 1 tablet (1,000 mg total) by mouth 2 (two) times a day with meals, Starting Wed 10/14/2020, Normal      naproxen (NAPROSYN) 500 mg tablet Take 1 tablet (500 mg total) by mouth 2 (two) times a day with meals, Starting Fri 10/2/2020, Normal      pyridoxine (VITAMIN B6) 100 mg tablet Take 100 mg by mouth daily, Historical Med      rivaroxaban (Xarelto) 20 mg tablet Take 1 tablet (20 mg total) by mouth every 24 hours, Starting Wed 10/14/2020, Normal      tamsulosin (FLOMAX) 0 4 mg Take 1 capsule (0 4 mg total) by mouth daily with dinner, Starting Wed 10/14/2020, Normal               PDMP Review     None           ED Provider  Attending physically available and evaluated Gary Riley  [facility] managed the patient along with the ED Attending      Electronically Signed by         Juan Francisco Benitez MD  05/28/21 5344

## 2021-08-24 ENCOUNTER — HOSPITAL ENCOUNTER (EMERGENCY)
Facility: HOSPITAL | Age: 51
Discharge: HOME | End: 2021-08-24
Attending: EMERGENCY MEDICINE | Admitting: EMERGENCY MEDICINE
Payer: COMMERCIAL

## 2021-08-24 ENCOUNTER — APPOINTMENT (EMERGENCY)
Dept: RADIOLOGY | Facility: HOSPITAL | Age: 51
End: 2021-08-24
Payer: COMMERCIAL

## 2021-08-24 VITALS
SYSTOLIC BLOOD PRESSURE: 142 MMHG | HEART RATE: 78 BPM | OXYGEN SATURATION: 100 % | DIASTOLIC BLOOD PRESSURE: 89 MMHG | RESPIRATION RATE: 16 BRPM | TEMPERATURE: 97.9 F

## 2021-08-24 DIAGNOSIS — N17.9 AKI (ACUTE KIDNEY INJURY) (HCC): ICD-10-CM

## 2021-08-24 DIAGNOSIS — R07.89 ATYPICAL CHEST PAIN: Primary | ICD-10-CM

## 2021-08-24 LAB
ANION GAP SERPL CALCULATED.3IONS-SCNC: 8 MMOL/L (ref 4–13)
ANION GAP SERPL CALCULATED.3IONS-SCNC: 9 MMOL/L (ref 4–13)
BASOPHILS # BLD AUTO: 0.04 THOUSANDS/ΜL (ref 0–0.1)
BASOPHILS NFR BLD AUTO: 1 % (ref 0–1)
BUN SERPL-MCNC: 18 MG/DL (ref 6–20)
BUN SERPL-MCNC: 18 MG/DL (ref 6–20)
CALCIUM SERPL-MCNC: 10 MG/DL (ref 8.4–10.2)
CALCIUM SERPL-MCNC: 9 MG/DL (ref 8.4–10.2)
CHLORIDE SERPL-SCNC: 101 MMOL/L (ref 96–108)
CHLORIDE SERPL-SCNC: 103 MMOL/L (ref 96–108)
CO2 SERPL-SCNC: 25 MMOL/L (ref 22–33)
CO2 SERPL-SCNC: 26 MMOL/L (ref 22–33)
CREAT SERPL-MCNC: 1.55 MG/DL (ref 0.5–1.2)
CREAT SERPL-MCNC: 1.81 MG/DL (ref 0.5–1.2)
D DIMER PPP FEU-MCNC: <0.19 MG/L FEU (ref 0.19–0.49)
EOSINOPHIL # BLD AUTO: 0.07 THOUSAND/ΜL (ref 0–0.61)
EOSINOPHIL NFR BLD AUTO: 1 % (ref 0–6)
ERYTHROCYTE [DISTWIDTH] IN BLOOD BY AUTOMATED COUNT: 13 % (ref 11.6–15.1)
GFR SERPL CREATININE-BSD FRML MDRD: 48 ML/MIN/1.73SQ M
GFR SERPL CREATININE-BSD FRML MDRD: 58 ML/MIN/1.73SQ M
GLUCOSE SERPL-MCNC: 138 MG/DL (ref 65–140)
GLUCOSE SERPL-MCNC: 162 MG/DL (ref 65–140)
HCT VFR BLD AUTO: 43.3 % (ref 36.5–49.3)
HGB BLD-MCNC: 14.3 G/DL (ref 12–17)
IMM GRANULOCYTES # BLD AUTO: 0.01 THOUSAND/UL (ref 0–0.2)
IMM GRANULOCYTES NFR BLD AUTO: 0 % (ref 0–2)
LYMPHOCYTES # BLD AUTO: 1.92 THOUSANDS/ΜL (ref 0.6–4.47)
LYMPHOCYTES NFR BLD AUTO: 39 % (ref 14–44)
MCH RBC QN AUTO: 27.7 PG (ref 26.8–34.3)
MCHC RBC AUTO-ENTMCNC: 33 G/DL (ref 31.4–37.4)
MCV RBC AUTO: 84 FL (ref 82–98)
MONOCYTES # BLD AUTO: 0.5 THOUSAND/ΜL (ref 0.17–1.22)
MONOCYTES NFR BLD AUTO: 10 % (ref 4–12)
NEUTROPHILS # BLD AUTO: 2.33 THOUSANDS/ΜL (ref 1.85–7.62)
NEUTS SEG NFR BLD AUTO: 49 % (ref 43–75)
PLATELET # BLD AUTO: 325 THOUSANDS/UL (ref 149–390)
PMV BLD AUTO: 9.7 FL (ref 8.9–12.7)
POTASSIUM SERPL-SCNC: 4 MMOL/L (ref 3.5–5)
POTASSIUM SERPL-SCNC: 4.1 MMOL/L (ref 3.5–5)
RBC # BLD AUTO: 5.17 MILLION/UL (ref 3.88–5.62)
SODIUM SERPL-SCNC: 135 MMOL/L (ref 133–145)
SODIUM SERPL-SCNC: 137 MMOL/L (ref 133–145)
TROPONIN I SERPL-MCNC: <0.03 NG/ML (ref 0–0.07)
TROPONIN I SERPL-MCNC: <0.03 NG/ML (ref 0–0.07)
WBC # BLD AUTO: 4.87 THOUSAND/UL (ref 4.31–10.16)

## 2021-08-24 PROCEDURE — 96361 HYDRATE IV INFUSION ADD-ON: CPT

## 2021-08-24 PROCEDURE — 99285 EMERGENCY DEPT VISIT HI MDM: CPT

## 2021-08-24 PROCEDURE — 80048 BASIC METABOLIC PNL TOTAL CA: CPT | Performed by: EMERGENCY MEDICINE

## 2021-08-24 PROCEDURE — 36415 COLL VENOUS BLD VENIPUNCTURE: CPT | Performed by: EMERGENCY MEDICINE

## 2021-08-24 PROCEDURE — 85379 FIBRIN DEGRADATION QUANT: CPT | Performed by: EMERGENCY MEDICINE

## 2021-08-24 PROCEDURE — 99285 EMERGENCY DEPT VISIT HI MDM: CPT | Performed by: EMERGENCY MEDICINE

## 2021-08-24 PROCEDURE — 84484 ASSAY OF TROPONIN QUANT: CPT | Performed by: EMERGENCY MEDICINE

## 2021-08-24 PROCEDURE — 71046 X-RAY EXAM CHEST 2 VIEWS: CPT

## 2021-08-24 PROCEDURE — 93005 ELECTROCARDIOGRAM TRACING: CPT

## 2021-08-24 PROCEDURE — 96360 HYDRATION IV INFUSION INIT: CPT

## 2021-08-24 PROCEDURE — 85025 COMPLETE CBC W/AUTO DIFF WBC: CPT | Performed by: EMERGENCY MEDICINE

## 2021-08-24 RX ADMIN — SODIUM CHLORIDE 1000 ML: 0.9 INJECTION, SOLUTION INTRAVENOUS at 16:29

## 2021-08-24 NOTE — ED PROVIDER NOTES
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 and episode of vomiting  Sinus tach on monitor and en route  No signs of ischemia  No signs of right heart strain  Resolved at this time but with some wooziness lightheadedness at this time  Has a history of large DVT, had clots through his xarelto  No cardiac history otherwise  History of anemia, no bleeding recently  History of DM  He's smoking, down to 1 pack / mo from 2 pack / day    On truvada - prep, no hx of HIV  Had prior isoniazid tx for TB, no active infection    - No language barrier    - History obtained from patient  - There are no limitations to the history obtained  - Previous charting underwent limited review with attention to last ED visits, labs, ekgs, and prior imaging  PMH:   has a past medical history of Anemia, Anxiety, Arthritis, Asthma, Blood clot in vein (2017), Blood disorder, Chronic pain disorder, Depression, Diabetes mellitus ([address] ), Enlarged prostate, Epilepsy ([address] ), Hypertension, Psychiatric disorder, Psychiatric illness, Psychosis ([address] ), Sleep apnea, and Vertigo  PSH:   has a past surgical history that includes Cyst Removal; Hernia repair; Esophagogastroduodenoscopy (N/A, 11/9/2016); and Esophagogastroduodenoscopy (N/A, 9/2/2016)  Social History:  pack year history multi  Patient drinks occasionally and socially    ROS:  Review of Systems   Constitutional: Negative for chills and fever  HENT: Negative for ear pain and sore throat  Eyes: Negative for pain and visual disturbance  Respiratory: Negative for cough and shortness of breath  Cardiovascular: Positive for chest pain  Negative for palpitations  Gastrointestinal: Positive for vomiting  Negative for abdominal pain  Genitourinary: Negative for dysuria and hematuria  Musculoskeletal: Negative for arthralgias and back pain  Skin: Negative for color change and rash  Neurological: Negative for seizures and syncope  All other systems reviewed and are negative  PE:     Physical exam highlights:   Physical Exam       Vitals:    08/24/21 1451 08/24/21 1454 08/24/21 1656 08/24/21 1830   BP: 135/86  157/94 142/89   BP Location: Left arm  Left arm Left arm   Pulse: 100  82 78   Resp: 20  16    Temp:  97 9 °F (36 6 °C)     TempSrc:  Oral     SpO2: 99%  100% 100%     Vitals reviewed by me  Nursing note reviewed  Chaperone present for all sensitive exam   Const: No acute distress  Alert  Nontoxic  Not diaphoretic  HEENT: External ears normal  No protrusion drainage swelling  Nose normal  No drainage/traumatic deformity  MMM  Mouth with baseline/symmetric movement  No trismus  Eyes: No squinting  No icterus  Tracks through the room with normal EOM  No tearing/swelling/drainage  Neck: ROM normal  No rigidity  No meningismus  Cards: Rate as per vitals  Compared to monitor sinus unless documented above  Regular  Well perfused  Pulm: able to verbalize without additional effort  Effort and excursion normal  No disress  No audible wheezing/ stridor  Normal resp rate  Abd: No distension beyond baseline  No fluctuant wave  Patient without peritoneal pain with shifting/bumping the bed  MSK: ROM normal and baseline  No deformity  Skin: No new rashes visible  Well perfused  Neuro: Nonfocal  Baseline  CN grossly intact  Moving all four with coordination  Psych: Normal behavior and affect  A:  - Nursing note reviewed      Ddx and MDM  ACS  Heat exhaustion  emanuel      HEART Risk Score      Most Recent Value   Heart Score Risk Calculator   History  1 Filed at: 08/24/2021 1502   ECG  0 Filed at: 08/24/2021 1502   Age  1 Filed at: 08/24/2021 1502   Risk Factors  2 Filed at: 08/24/2021 1502 Troponin  0 Filed at: 08/24/2021 1502   HEART Score  4 Filed at: 08/24/2021 1502                     ED Course as of Aug 24 2101   Tue Aug 24, 2021   1705 Discuss admission for patient,       1815 Procedure Note: EKG  Date/Time: 08/24/21 6:15 PM   Interpreted by: Son Ballesteros  Indications / Diagnosis: CP  ECG reviewed by me, the ED Provider: yes   The EKG demonstrates:  Rhythm: sinus  tach  Intervals: normal intervals  Axis: normal axis  QRS/Blocks: normal QRS  ST Changes: No acute ST Changes, no STD/GYPSY  J point elevation anterior  T wave changes: none    Delta:    Procedure Note: EKG  Date/Time: 08/24/21 6:16 PM   Interpreted by: Son Ballesteros  Indications / Diagnosis: CP  ECG reviewed by me, the ED Provider: yes   The EKG demonstrates:  Rhythm: sinus    Intervals: normal intervals  Axis: normal axis  QRS/Blocks: normal QRS  ST Changes: No acute ST Changes, no STD/GYPSY  T wave changes: none                XR chest 2 views    (Results Pending)     Orders Placed This Encounter   Procedures    XR chest 2 views    D-dimer, quantitative    Troponin I    CBC and differential    Basic metabolic panel    Basic metabolic panel    ECG 12 lead     Labs Reviewed   D-DIMER, QUANTITATIVE - Abnormal       Result Value Ref Range Status    D-Dimer, Quant  <0 19 (*) 0 19 - 0 49 mg/L FEU Final    Comment: Reference and upper limits to exclude DVT and PE are the same  Do not use to exclude if clinical symptoms are present     BASIC METABOLIC PANEL - Abnormal    Sodium 135  133 - 145 mmol/L Final    Potassium 4 1  3 5 - 5 0 mmol/L Final    Chloride 101  96 - 108 mmol/L Final    CO2 25  22 - 33 mmol/L Final    ANION GAP 9  4 - 13 mmol/L Final    BUN 18  6 - 20 mg/dL Final    Creatinine 1 81 (*) 0 50 - 1 20 mg/dL Final    Comment: Standardized to IDMS reference method    Glucose 162 (*) 65 - 140 mg/dL Final    Comment: If the patient is fasting, the ADA then defines impaired fasting glucose as > 100 mg/dL and diabetes as > or equal to 123 mg/dL  Specimen collection should occur prior to Sulfasalazine administration due to the potential for falsely depressed results  Specimen collection should occur prior to Sulfapyridine administration due to the potential for falsely elevated results  Calcium 10 0  8 4 - 10 2 mg/dL Final    eGFR 48  ml/min/1 73sq m Final    Narrative:     [place] guidelines for Chronic Kidney Disease (CKD):     Stage 1 with normal or high GFR (GFR > 90 mL/min/1 73 square meters)    Stage 2 Mild CKD (GFR = 60-89 mL/min/1 73 square meters)    Stage 3A Moderate CKD (GFR = 45-59 mL/min/1 73 square meters)    Stage 3B Moderate CKD (GFR = 30-44 mL/min/1 73 square meters)    Stage 4 Severe CKD (GFR = 15-29 mL/min/1 73 square meters)    Stage 5 End Stage CKD (GFR <15 mL/min/1 73 square meters)  Note: GFR calculation is accurate only with a steady state creatinine   BASIC METABOLIC PANEL - Abnormal    Sodium 137  133 - 145 mmol/L Final    Potassium 4 0  3 5 - 5 0 mmol/L Final    Chloride 103  96 - 108 mmol/L Final    CO2 26  22 - 33 mmol/L Final    ANION GAP 8  4 - 13 mmol/L Final    BUN 18  6 - 20 mg/dL Final    Creatinine 1 55 (*) 0 50 - 1 20 mg/dL Final    Comment: Standardized to IDMS reference method    Glucose 138  65 - 140 mg/dL Final    Comment: If the patient is fasting, the ADA then defines impaired fasting glucose as > 100 mg/dL and diabetes as > or equal to 123 mg/dL  Specimen collection should occur prior to Sulfasalazine administration due to the potential for falsely depressed results  Specimen collection should occur prior to Sulfapyridine administration due to the potential for falsely elevated results      Calcium 9 0  8 4 - 10 2 mg/dL Final    eGFR 58  ml/min/1 73sq m Final    Narrative:     [place] guidelines for Chronic Kidney Disease (CKD):     Stage 1 with normal or high GFR (GFR > 90 mL/min/1 73 square meters)    Stage 2 Mild CKD (GFR = 60-89 mL/min/1 73 square meters)    Stage 3A Moderate CKD (GFR = 45-59 mL/min/1 73 square meters)    Stage 3B Moderate CKD (GFR = 30-44 mL/min/1 73 square meters)    Stage 4 Severe CKD (GFR = 15-29 mL/min/1 73 square meters)    Stage 5 End Stage CKD (GFR <15 mL/min/1 73 square meters)  Note: GFR calculation is accurate only with a steady state creatinine   TROPONIN I - Normal    Troponin I <0 03  0 00 - 0 07 ng/mL Final    Comment: [employer] Chemistry analyzer 99% cutoff is > 0 03ng/mL    o cTnl 99% cutoff is useful only when applied to patients in the clinical setting of myocardial ischemia  o cTnl 99% cutoff should be interpreted in the context of clinical history, ECG findings and possibly cardiac imaging to establish correct diagnosis  o cTnl 99% cutoff may be suggestive but clearly not indicative of a coronary event without the clinical setting of myocardial ischemia     CBC AND DIFFERENTIAL - Normal    WBC 4 87  4 31 - 10 16 Thousand/uL Final    RBC 5 17  3 88 - 5 62 Million/uL Final    Hemoglobin 14 3  12 0 - 17 0 g/dL Final    Hematocrit 43 3  36 5 - 49 3 % Final    MCV 84  82 - 98 fL Final    MCH 27 7  26 8 - 34 3 pg Final    MCHC 33 0  31 4 - 37 4 g/dL Final    RDW 13 0  11 6 - 15 1 % Final    MPV 9 7  8 9 - 12 7 fL Final    Platelets 702  849 - 390 Thousands/uL Final    Neutrophils Relative 49  43 - 75 % Final    Immat GRANS % 0  0 - 2 % Final    Lymphocytes Relative 39  14 - 44 % Final    Monocytes Relative 10  4 - 12 % Final    Eosinophils Relative 1  0 - 6 % Final    Basophils Relative 1  0 - 1 % Final    Neutrophils Absolute 2 33  1 85 - 7 62 Thousands/µL Final    Immature Grans Absolute 0 01  0 00 - 0 20 Thousand/uL Final    Lymphocytes Absolute 1 92  0 60 - 4 47 Thousands/µL Final    Monocytes Absolute 0 50  0 17 - 1 22 Thousand/µL Final    Eosinophils Absolute 0 07  0 00 - 0 61 Thousand/µL Final    Basophils Absolute 0 04  0 00 - 0 10 Thousands/µL Final   TROPONIN I - Normal    Troponin I <0 03 0 00 - 0 07 ng/mL Final    Comment: [employer] Chemistry analyzer 99% cutoff is > 0 03ng/mL    o cTnl 99% cutoff is useful only when applied to patients in the clinical setting of myocardial ischemia  o cTnl 99% cutoff should be interpreted in the context of clinical history, ECG findings and possibly cardiac imaging to establish correct diagnosis  o cTnl 99% cutoff may be suggestive but clearly not indicative of a coronary event without the clinical setting of myocardial ischemia  Final Diagnosis:  1  Atypical chest pain    2  DG (acute kidney injury) ([facility] [address] )        P:  - hospital tx includes fluid resusc, received ASA  - disposition home - I discuss with patient admission for prompt stress and possible cath but patient prefers to follow outpatient  Asymptomatc  Delta trop negative  Delta GFR improved  I believe this is a safe decision for the patient to make, but we discuss HEART score defined risk  - additional tx intended, if consistent with primary provider stress test  - patient to follow with PCP, cards   - patient will call their PCP to let them know they were in the emergency department  We discuss return precautions     Medications   sodium chloride 0 9 % bolus 1,000 mL (0 mL Intravenous Stopped 8/24/21 1804)     Time reflects when diagnosis was documented in both MDM as applicable and the Disposition within this note     Time User Action Codes Description Comment    8/24/2021  6:47 PM Vaishnavi Epps Add [R07 89] Atypical chest pain     8/24/2021  6:47 PM Vaishnavi Epps Add [N17 9] DG (acute kidney injury) [facility])       ED Disposition     ED Disposition Condition Date/Time Comment    Discharge Stable Tue Aug 24, 2021  6:47 PM Ami Olmos discharge to home/self care              Follow-up Information     Follow up With Specialties Details Why Contact Info Additional Information    Ronal Cabral, [phone], Nurse Practitioner Schedule an appointment as soon as possible for a visit [facility] Cardiology   [address] [address] [facility] [phone] [id number] Cardiology [address] [facility], [address] [place] [address], Fred[address]        Discharge Medication List as of 8/24/2021  6:49 PM      CONTINUE these medications which have NOT CHANGED    Details   emtricitabine-tenofovir (Truvada) 200-300 mg per tablet Take 1 tablet by mouth every 24 hours, Starting u 2/25/2021, Normal      metFORMIN (GLUCOPHAGE) 1000 MG tablet Take 1 tablet (1,000 mg total) by mouth 2 (two) times a day with meals, Starting Wed 10/14/2020, Normal      rivaroxaban (Xarelto) 20 mg tablet Take 1 tablet (20 mg total) by mouth every 24 hours, Starting Wed 10/14/2020, Normal      ACCU-CHEK FASTCLIX LANCETS MISC by Does not apply route daily, Starting Wed 11/28/2018, Normal      ACCU-CHEK GUIDE test strip Historical Med      albuterol (ProAir HFA) 90 mcg/act inhaler Inhale 2 puffs every 4 (four) hours as needed for wheezing or shortness of breath, Starting Wed 10/14/2020, Normal      atorvastatin (LIPITOR) 10 mg tablet Take 1 tablet (10 mg total) by mouth daily, Starting Wed 10/14/2020, Normal      Blood Glucose Monitoring Suppl (ACCU-CHEK GUIDE) w/Device KIT by Does not apply route daily, Starting Wed 11/28/2018, Normal      isoniazid ([place]DRAZID) 100 mg tablet Take 100 mg by mouth daily, Historical Med      pyridoxine (VITAMIN B6) 100 mg tablet Take 100 mg by mouth daily, Historical Med           No discharge procedures on file  Prior to Admission Medications   Prescriptions Last Dose Informant Patient Reported? Taking?    ACCU-CHEK FASTCLIX LANCETS MISC   No No   Sig: by Does not apply route daily   ACCU-CHEK GUIDE test strip   Yes No   Blood Glucose Monitoring Suppl (ACCU-CHEK GUIDE) w/Device KIT   No No   Sig: by Does not apply route daily   albuterol (ProAir HFA) 90 mcg/act inhaler   No No   Sig: Inhale 2 puffs every 4 (four) hours as needed for wheezing or shortness of breath   atorvastatin (LIPITOR) 10 mg tablet   No No   Sig: Take 1 tablet (10 mg total) by mouth daily   emtricitabine-tenofovir (Truvada) 200-300 mg per tablet 8/24/2021 at Unknown time  No Yes   Sig: Take 1 tablet by mouth every 24 hours   isoniazid ([place]DRAZID) 100 mg tablet  Self Yes No   Sig: Take 100 mg by mouth daily   metFORMIN (GLUCOPHAGE) 1000 MG tablet 8/24/2021 at Unknown time  No Yes   Sig: Take 1 tablet (1,000 mg total) by mouth 2 (two) times a day with meals   pyridoxine (VITAMIN B6) 100 mg tablet  Self Yes No   Sig: Take 100 mg by mouth daily   rivaroxaban (Xarelto) 20 mg tablet 8/24/2021 at Unknown time  No Yes   Sig: Take 1 tablet (20 mg total) by mouth every 24 hours      Facility-Administered Medications: None       Portions of the record may have been created with voice recognition software  Occasional wrong word or "sound a like" substitutions may have occurred due to the inherent limitations of voice recognition software  Read the chart carefully and recognize, using context, where substitutions have occurred      Electronically signed by:  MD Robert Wiley MD  08/24/21 8781

## 2021-08-24 NOTE — DISCHARGE INSTRUCTIONS
As we discussed, your kidney function appears to suffer with dehydration, and improved with fluids   Continue to hydrate and follow with your PCP for a lab recheck    As we discussed, your "HEART score" indicates you would benefit from a prompt stress test  As you wish to go home, I would encourage you to follow with a cardiologist

## 2021-08-25 LAB
ATRIAL RATE: 100 BPM
ATRIAL RATE: 69 BPM
P AXIS: 63 DEGREES
P AXIS: 72 DEGREES
PR INTERVAL: 151 MS
PR INTERVAL: 154 MS
QRS AXIS: 49 DEGREES
QRS AXIS: 64 DEGREES
QRSD INTERVAL: 79 MS
QRSD INTERVAL: 82 MS
QT INTERVAL: 334 MS
QT INTERVAL: 369 MS
QTC INTERVAL: 399 MS
QTC INTERVAL: 431 MS
T WAVE AXIS: 27 DEGREES
T WAVE AXIS: 64 DEGREES
VENTRICULAR RATE: 100 BPM
VENTRICULAR RATE: 70 BPM

## 2021-08-25 PROCEDURE — 93010 ELECTROCARDIOGRAM REPORT: CPT | Performed by: INTERNAL MEDICINE

## 2021-09-07 ENCOUNTER — TELEPHONE (OUTPATIENT)
Dept: FAMILY MEDICINE CLINIC | Facility: CLINIC | Age: 51
End: 2021-09-07

## 2022-01-10 ENCOUNTER — HOSPITAL ENCOUNTER (EMERGENCY)
Facility: HOSPITAL | Age: 52
End: 2022-01-10
Attending: EMERGENCY MEDICINE | Admitting: EMERGENCY MEDICINE
Payer: COMMERCIAL

## 2022-01-10 ENCOUNTER — HOSPITAL ENCOUNTER (INPATIENT)
Facility: HOSPITAL | Age: 52
LOS: 2 days | Discharge: HOME | DRG: 339 | End: 2022-01-12
Attending: SPECIALIST | Admitting: SPECIALIST
Payer: COMMERCIAL

## 2022-01-10 ENCOUNTER — APPOINTMENT (EMERGENCY)
Dept: CT IMAGING | Facility: HOSPITAL | Age: 52
End: 2022-01-10
Payer: COMMERCIAL

## 2022-01-10 VITALS
SYSTOLIC BLOOD PRESSURE: 115 MMHG | RESPIRATION RATE: 12 BRPM | BODY MASS INDEX: 26.73 KG/M2 | HEIGHT: 70 IN | TEMPERATURE: 98.1 F | HEART RATE: 67 BPM | OXYGEN SATURATION: 100 % | DIASTOLIC BLOOD PRESSURE: 74 MMHG | WEIGHT: 186.73 LBS

## 2022-01-10 DIAGNOSIS — Z90.49 S/P APPENDECTOMY: ICD-10-CM

## 2022-01-10 DIAGNOSIS — K35.32 ACUTE APPENDICITIS WITH PERFORATION AND LOCALIZED PERITONITIS, UNSPECIFIED WHETHER ABSCESS PRESENT, UNSPECIFIED WHETHER GANGRENE PRESENT: Primary | ICD-10-CM

## 2022-01-10 DIAGNOSIS — K35.20 ACUTE APPENDICITIS WITH PERFORATION AND GENERALIZED PERITONITIS, WITHOUT ABSCESS OR GANGRENE: Primary | ICD-10-CM

## 2022-01-10 DIAGNOSIS — K35.32 ACUTE PERFORATED APPENDICITIS: ICD-10-CM

## 2022-01-10 LAB
ALBUMIN SERPL BCP-MCNC: 4 G/DL (ref 3.4–4.8)
ALP SERPL-CCNC: 74.2 U/L (ref 10–129)
ALT SERPL W P-5'-P-CCNC: 31 U/L (ref 5–63)
ANION GAP SERPL CALCULATED.3IONS-SCNC: 5 MMOL/L (ref 4–13)
AST SERPL W P-5'-P-CCNC: 18 U/L (ref 15–41)
BACTERIA UR QL AUTO: NORMAL /HPF
BASOPHILS # BLD AUTO: 0.04 THOUSANDS/ΜL (ref 0–0.1)
BASOPHILS NFR BLD AUTO: 1 % (ref 0–1)
BILIRUB SERPL-MCNC: 0.99 MG/DL (ref 0.3–1.2)
BILIRUB UR QL STRIP: NEGATIVE
BUN SERPL-MCNC: 11 MG/DL (ref 6–20)
CALCIUM SERPL-MCNC: 8.9 MG/DL (ref 8.4–10.2)
CHLORIDE SERPL-SCNC: 102 MMOL/L (ref 96–108)
CLARITY UR: CLEAR
CO2 SERPL-SCNC: 29 MMOL/L (ref 22–33)
COLOR UR: YELLOW
CREAT SERPL-MCNC: 1.14 MG/DL (ref 0.5–1.2)
EOSINOPHIL # BLD AUTO: 0.05 THOUSAND/ΜL (ref 0–0.61)
EOSINOPHIL NFR BLD AUTO: 1 % (ref 0–6)
ERYTHROCYTE [DISTWIDTH] IN BLOOD BY AUTOMATED COUNT: 12.9 % (ref 11.6–15.1)
GFR SERPL CREATININE-BSD FRML MDRD: 73 ML/MIN/1.73SQ M
GLUCOSE SERPL-MCNC: 134 MG/DL (ref 65–140)
GLUCOSE UR STRIP-MCNC: ABNORMAL MG/DL
HCT VFR BLD AUTO: 39.3 % (ref 36.5–49.3)
HGB BLD-MCNC: 13.1 G/DL (ref 12–17)
HGB UR QL STRIP.AUTO: ABNORMAL
IMM GRANULOCYTES # BLD AUTO: 0.02 THOUSAND/UL (ref 0–0.2)
IMM GRANULOCYTES NFR BLD AUTO: 0 % (ref 0–2)
KETONES UR STRIP-MCNC: NEGATIVE MG/DL
LEUKOCYTE ESTERASE UR QL STRIP: NEGATIVE
LYMPHOCYTES # BLD AUTO: 2.01 THOUSANDS/ΜL (ref 0.6–4.47)
LYMPHOCYTES NFR BLD AUTO: 26 % (ref 14–44)
MCH RBC QN AUTO: 27.9 PG (ref 26.8–34.3)
MCHC RBC AUTO-ENTMCNC: 33.3 G/DL (ref 31.4–37.4)
MCV RBC AUTO: 84 FL (ref 82–98)
MONOCYTES # BLD AUTO: 0.93 THOUSAND/ΜL (ref 0.17–1.22)
MONOCYTES NFR BLD AUTO: 12 % (ref 4–12)
NEUTROPHILS # BLD AUTO: 4.69 THOUSANDS/ΜL (ref 1.85–7.62)
NEUTS SEG NFR BLD AUTO: 60 % (ref 43–75)
NITRITE UR QL STRIP: NEGATIVE
NON-SQ EPI CELLS URNS QL MICRO: NORMAL /HPF
NRBC BLD AUTO-RTO: 0 /100 WBCS
PH UR STRIP.AUTO: 6 [PH]
PLATELET # BLD AUTO: 308 THOUSANDS/UL (ref 149–390)
PMV BLD AUTO: 9.4 FL (ref 8.9–12.7)
POTASSIUM SERPL-SCNC: 3.7 MMOL/L (ref 3.5–5)
PROT SERPL-MCNC: 6.8 G/DL (ref 6.4–8.3)
PROT UR STRIP-MCNC: NEGATIVE MG/DL
RBC # BLD AUTO: 4.7 MILLION/UL (ref 3.88–5.62)
RBC #/AREA URNS AUTO: NORMAL /HPF
SODIUM SERPL-SCNC: 136 MMOL/L (ref 133–145)
SP GR UR STRIP.AUTO: 1.01 (ref 1–1.03)
UROBILINOGEN UR QL STRIP.AUTO: 1 E.U./DL
WBC # BLD AUTO: 7.74 THOUSAND/UL (ref 4.31–10.16)
WBC #/AREA URNS AUTO: NORMAL /HPF

## 2022-01-10 PROCEDURE — 99285 EMERGENCY DEPT VISIT HI MDM: CPT | Performed by: PHYSICIAN ASSISTANT

## 2022-01-10 PROCEDURE — 81003 URINALYSIS AUTO W/O SCOPE: CPT | Performed by: PHYSICIAN ASSISTANT

## 2022-01-10 PROCEDURE — 74176 CT ABD & PELVIS W/O CONTRAST: CPT

## 2022-01-10 PROCEDURE — 96365 THER/PROPH/DIAG IV INF INIT: CPT

## 2022-01-10 PROCEDURE — 96375 TX/PRO/DX INJ NEW DRUG ADDON: CPT

## 2022-01-10 PROCEDURE — G1004 CDSM NDSC: HCPCS

## 2022-01-10 PROCEDURE — 80053 COMPREHEN METABOLIC PANEL: CPT | Performed by: PHYSICIAN ASSISTANT

## 2022-01-10 PROCEDURE — 99285 EMERGENCY DEPT VISIT HI MDM: CPT | Performed by: SURGERY

## 2022-01-10 PROCEDURE — 99285 EMERGENCY DEPT VISIT HI MDM: CPT

## 2022-01-10 PROCEDURE — 96361 HYDRATE IV INFUSION ADD-ON: CPT

## 2022-01-10 PROCEDURE — 36415 COLL VENOUS BLD VENIPUNCTURE: CPT | Performed by: PHYSICIAN ASSISTANT

## 2022-01-10 PROCEDURE — 85025 COMPLETE CBC W/AUTO DIFF WBC: CPT | Performed by: PHYSICIAN ASSISTANT

## 2022-01-10 PROCEDURE — 81001 URINALYSIS AUTO W/SCOPE: CPT | Performed by: PHYSICIAN ASSISTANT

## 2022-01-10 RX ORDER — KETOROLAC TROMETHAMINE 30 MG/ML
30 INJECTION, SOLUTION INTRAMUSCULAR; INTRAVENOUS ONCE
Status: COMPLETED | OUTPATIENT
Start: 2022-01-10 | End: 2022-01-10

## 2022-01-10 RX ORDER — MORPHINE SULFATE 4 MG/ML
4 INJECTION, SOLUTION INTRAMUSCULAR; INTRAVENOUS ONCE
Status: COMPLETED | OUTPATIENT
Start: 2022-01-10 | End: 2022-01-10

## 2022-01-10 RX ORDER — ONDANSETRON 2 MG/ML
4 INJECTION INTRAMUSCULAR; INTRAVENOUS ONCE
Status: COMPLETED | OUTPATIENT
Start: 2022-01-10 | End: 2022-01-10

## 2022-01-10 RX ORDER — HYDROMORPHONE HCL/PF 1 MG/ML
1 SYRINGE (ML) INJECTION EVERY 6 HOURS PRN
Status: DISCONTINUED | OUTPATIENT
Start: 2022-01-10 | End: 2022-01-12

## 2022-01-10 RX ORDER — SODIUM CHLORIDE, SODIUM LACTATE, POTASSIUM CHLORIDE, CALCIUM CHLORIDE 600; 310; 30; 20 MG/100ML; MG/100ML; MG/100ML; MG/100ML
125 INJECTION, SOLUTION INTRAVENOUS CONTINUOUS
Status: DISCONTINUED | OUTPATIENT
Start: 2022-01-10 | End: 2022-01-11 | Stop reason: SDUPTHER

## 2022-01-10 RX ORDER — HEPARIN SODIUM 5000 [USP'U]/ML
5000 INJECTION, SOLUTION INTRAVENOUS; SUBCUTANEOUS EVERY 8 HOURS SCHEDULED
Status: DISCONTINUED | OUTPATIENT
Start: 2022-01-10 | End: 2022-01-12

## 2022-01-10 RX ORDER — ONDANSETRON 2 MG/ML
4 INJECTION INTRAMUSCULAR; INTRAVENOUS EVERY 6 HOURS PRN
Status: DISCONTINUED | OUTPATIENT
Start: 2022-01-10 | End: 2022-01-12 | Stop reason: HOSPADM

## 2022-01-10 RX ADMIN — SODIUM CHLORIDE 1000 ML: 0.9 INJECTION, SOLUTION INTRAVENOUS at 11:47

## 2022-01-10 RX ADMIN — KETOROLAC TROMETHAMINE 30 MG: 30 INJECTION, SOLUTION INTRAMUSCULAR at 11:49

## 2022-01-10 RX ADMIN — HEPARIN SODIUM 5000 UNITS: 5000 INJECTION INTRAVENOUS; SUBCUTANEOUS at 21:34

## 2022-01-10 RX ADMIN — SODIUM CHLORIDE, SODIUM LACTATE, POTASSIUM CHLORIDE, AND CALCIUM CHLORIDE 125 ML/HR: .6; .31; .03; .02 INJECTION, SOLUTION INTRAVENOUS at 21:36

## 2022-01-10 RX ADMIN — HYDROMORPHONE HYDROCHLORIDE 1 MG: 1 INJECTION, SOLUTION INTRAMUSCULAR; INTRAVENOUS; SUBCUTANEOUS at 21:33

## 2022-01-10 RX ADMIN — ONDANSETRON 4 MG: 2 INJECTION INTRAMUSCULAR; INTRAVENOUS at 11:47

## 2022-01-10 RX ADMIN — PIPERACILLIN AND TAZOBACTAM 3.38 G: 36; 4.5 INJECTION, POWDER, FOR SOLUTION INTRAVENOUS at 21:33

## 2022-01-10 RX ADMIN — MORPHINE SULFATE 4 MG: 4 INJECTION INTRAVENOUS at 18:03

## 2022-01-10 RX ADMIN — PIPERACILLIN AND TAZOBACTAM 3.38 G: 3; .375 INJECTION, POWDER, FOR SOLUTION INTRAVENOUS at 13:07

## 2022-01-10 NOTE — EMTALA/ACUTE CARE TRANSFER
[facility]  EMERGENCY DEPARTMENT  [address] [facility] [address]  Dept: [phone]      EMTALA TRANSFER CONSENT    NAME Mariana SARMIENTO 1970                              MRN [id number]    I have been informed of my rights regarding examination, treatment, and transfer   by Dr Trent Encinas MD    Benefits: Specialized equipment and/or services available at the receiving facility (Include comment)________________________    Risks: Potential for delay in receiving treatment      Consent for Transfer:  I acknowledge that my medical condition has been evaluated and explained to me by the emergency department physician or other qualified medical person and/or my attending physician, who has recommended that I be transferred to the service of  Accepting Physician: Phil Nichole at [address] Name, [address] : Gage Salguero  The above potential benefits of such transfer, the potential risks associated with such transfer, and the probable risks of not being transferred have been explained to me, and I fully understand them  The doctor has explained that, in my case, the benefits of transfer outweigh the risks  I agree to be transferred  I authorize the performance of emergency medical procedures and treatments upon me in both transit and upon arrival at the receiving facility  Additionally, I authorize the release of any and all medical records to the receiving facility and request they be transported with me, if possible  I understand that the safest mode of transportation during a medical emergency is an ambulance and that the Hospital advocates the use of this mode of transport  Risks of traveling to the receiving facility by car, including absence of medical control, life sustaining equipment, such as oxygen, and medical personnel has been explained to me and I fully understand them      (BETH CORRECT BOX BELOW)  [  ]  I consent to the stated transfer and to be transported by ambulance/helicopter  [  ]  I consent to the stated transfer, but refuse transportation by ambulance and accept full responsibility for my transportation by car  I understand the risks of non-ambulance transfers and I exonerate the Hospital and its staff from any deterioration in my condition that results from this refusal     X___________________________________________    DATE  01/10/22  TIME________  Signature of patient or legally responsible individual signing on patient behalf           RELATIONSHIP TO PATIENT_________________________          Provider Certification    NAME Minerva Roy                                         1970                              MRN [id number]    A medical screening exam was performed on the above named patient  Based on the examination:    Condition Necessitating Transfer The encounter diagnosis was Acute appendicitis with perforation and generalized peritonitis, without abscess or gangrene  Patient Condition: The patient has been stabilized such that within reasonable medical probability, no material deterioration of the patient condition or the condition of the unborn child(sree) is likely to result from the transfer    Reason for Transfer: Level of Care needed not available at this facility    Transfer Requirements: Edmund Bethea   · Space available and qualified personnel available for treatment as acknowledged by Clyde Fitzgerald  · Agreed to accept transfer and to provide appropriate medical treatment as acknowledged by       Kay  · Appropriate medical records of the examination and treatment of the patient are provided at the time of transfer   [address], [address] _______  · Transfer will be performed by qualified personnel from [facility]  and appropriate transfer equipment as required, including the use of necessary and appropriate life support measures      Provider Certification: I have examined the patient and explained the following risks and benefits of being transferred/refusing transfer to the patient/family:         Based on these reasonable risks and benefits to the patient and/or the unborn child(sree), and based upon the information available at the time of the patients examination, I certify that the medical benefits reasonably to be expected from the provision of appropriate medical treatments at another medical facility outweigh the increasing risks, if any, to the individuals medical condition, and in the case of labor to the unborn child, from effecting the transfer      X____________________________________________ DATE 01/10/22        TIME_______      ORIGINAL - SEND TO MEDICAL RECORDS   COPY - SEND WITH PATIENT DURING TRANSFER

## 2022-01-10 NOTE — ED PROVIDER NOTES
History  Chief Complaint   Patient presents with    Flank Pain     R flank pain since this AM, denies urinary complaints, denies NVD, pain with breathing     Pt with Past Medical History: Anemia, Anxiety, Arthritis, Asthma, Blood clot in vein 2017, Depression, Diabetes mellitus-type 2,  Enlarged prostate, Epilepsy, Hypertension, Bipolar, Psychosis, Sleep apnea, Vertigo   Past Surgical History: HERNIA REPAIR    Presents to ED c/o sudden onset, atraumatic, right flank/right sided abd pain, awoke him from sleep this am, worse with movement, deep breath, no fever, no urinary complaints, no cp, no sob, no fever, NVD, no rash  Did not take any medication for pain  Prior to Admission Medications   Prescriptions Last Dose Informant Patient Reported? Taking?    ACCU-CHEK FASTCLIX LANCETS MISC   No No   Sig: by Does not apply route daily   ACCU-CHEK GUIDE test strip   Yes No   Blood Glucose Monitoring Suppl (ACCU-CHEK GUIDE) w/Device KIT   No No   Sig: by Does not apply route daily   albuterol (ProAir HFA) 90 mcg/act inhaler   No No   Sig: Inhale 2 puffs every 4 (four) hours as needed for wheezing or shortness of breath   atorvastatin (LIPITOR) 10 mg tablet   No No   Sig: Take 1 tablet (10 mg total) by mouth daily   emtricitabine-tenofovir (Truvada) 200-300 mg per tablet   No No   Sig: Take 1 tablet by mouth every 24 hours   isoniazid (NYDRAZID) 100 mg tablet  Self Yes No   Sig: Take 100 mg by mouth daily   metFORMIN (GLUCOPHAGE) 1000 MG tablet   No No   Sig: Take 1 tablet (1,000 mg total) by mouth 2 (two) times a day with meals   pyridoxine (VITAMIN B6) 100 mg tablet  Self Yes No   Sig: Take 100 mg by mouth daily   rivaroxaban (Xarelto) 20 mg tablet   No No   Sig: Take 1 tablet (20 mg total) by mouth every 24 hours      Facility-Administered Medications: None       Past Medical History:   Diagnosis Date    Anemia     Anxiety     Arthritis     Asthma     Blood clot in vein 2017    right leg    Blood disorder  Chronic pain disorder     good control    Depression     Diabetes mellitus ([facility] )     type 2    Enlarged prostate     Epilepsy ([facility] )     past, last was 1 year ago    Hypertension     Psychiatric disorder     bipolar    Psychiatric illness     Psychosis ([facility] )     Sleep apnea     snores but hasn't been tested    Vertigo        Past Surgical History:   Procedure Laterality Date    CYST REMOVAL      ESOPHAGOGASTRODUODENOSCOPY N/A 11/9/2016    Procedure: ESOPHAGOGASTRODUODENOSCOPY (EGD); Surgeon: Alyson Galvez MD;  [facility]; Service:     ESOPHAGOGASTRODUODENOSCOPY N/A 9/2/2016    Procedure: ESOPHAGOGASTRODUODENOSCOPY (EGD); Surgeon: Alyson Galvez MD;  Location[facility]; Service:     HERNIA REPAIR         Family History   Problem Relation Age of Onset    No Known Problems Mother     Colon cancer Father 61    No Known Problems Sister     No Known Problems Brother     No Known Problems Maternal Aunt     No Known Problems Paternal Aunt     No Known Problems Maternal Uncle     No Known Problems Paternal Uncle     No Known Problems Maternal Grandfather     No Known Problems Maternal Grandmother     No Known Problems Paternal Grandfather     Hypertension Paternal Grandmother     No Known Problems Cousin     Prostate cancer Family     Stroke Family     Diabetes Family         MELLITUS    Coronary artery disease Family      I have reviewed and agree with the history as documented      E-Cigarette/Vaping    E-Cigarette Use Never User      E-Cigarette/Vaping Substances    Nicotine No     THC No     CBD No     Flavoring No     Other No     Unknown No      Social History     Tobacco Use    Smoking status: Current Some Day Smoker     Packs/day: 0 25     Years: 15 00     Pack years: 3 75     Types: Cigarettes    Smokeless tobacco: Current User    Tobacco comment: pack a month   Vaping Use    Vaping Use: Never used   Substance Use Topics    Alcohol use: Yes     Comment: occassional    Drug use: Yes     Types: Marijuana       Review of Systems   Constitutional: Negative for chills and fever  HENT: Negative for hearing loss, sore throat and trouble swallowing  Eyes: Negative for visual disturbance  Respiratory: Negative for cough and shortness of breath  Cardiovascular: Negative for chest pain and leg swelling  Gastrointestinal: Positive for abdominal pain  Negative for diarrhea, nausea and vomiting  Genitourinary: Positive for flank pain  Negative for dysuria, frequency, hematuria, penile discharge, penile swelling, scrotal swelling and testicular pain  Musculoskeletal: Positive for back pain  Negative for arthralgias and myalgias  Skin: Negative for pallor and rash  Neurological: Negative for dizziness, weakness and headaches  Psychiatric/Behavioral: Negative for behavioral problems  All other systems reviewed and are negative  Physical Exam  Physical Exam  Vitals and nursing note reviewed  Constitutional:       General: He is in acute distress  Appearance: Normal appearance  He is well-developed  HENT:      Head: Normocephalic and atraumatic  Right Ear: External ear normal       Left Ear: External ear normal       Nose: Nose normal       Mouth/Throat:      Mouth: Mucous membranes are moist       Pharynx: Oropharynx is clear  Eyes:      Conjunctiva/sclera: Conjunctivae normal    Cardiovascular:      Rate and Rhythm: Normal rate and regular rhythm  Pulmonary:      Effort: Pulmonary effort is normal  No respiratory distress  Breath sounds: Normal breath sounds  Abdominal:      General: Bowel sounds are normal       Palpations: Abdomen is soft  There is no mass  Tenderness: There is abdominal tenderness (diffuse right sided)  There is right CVA tenderness, guarding and rebound  Genitourinary:     Penis: Normal        Testes: Normal       Comments: No palpable hernia  Musculoskeletal:         General: No signs of injury  Normal range of motion  Cervical back: Normal range of motion  Skin:     General: Skin is warm and dry  Capillary Refill: Capillary refill takes less than 2 seconds  Neurological:      General: No focal deficit present  Mental Status: He is alert and oriented to person, place, and time  Motor: No weakness     Psychiatric:         Mood and Affect: Mood normal          Behavior: Behavior normal          Vital Signs  ED Triage Vitals [01/10/22 1110]   Temperature Pulse Respirations Blood Pressure SpO2   97 6 °F (36 4 °C) 95 18 96/64 94 %      Temp Source Heart Rate Source Patient Position - Orthostatic VS BP Location FiO2 (%)   Oral Monitor Sitting Left arm --      Pain Score       10 - Worst Possible Pain           Vitals:    01/10/22 1110 01/10/22 1338 01/10/22 1638   BP: 96/64 108/66 115/74   Pulse: 95 55 67   Patient Position - Orthostatic VS: Sitting Sitting Lying         Visual Acuity      ED Medications  Medications   sodium chloride 0 9 % bolus 1,000 mL ( Intravenous Restarted 1/10/22 1425)   ondansetron (ZOFRAN) injection 4 mg (4 mg Intravenous Given 1/10/22 1147)   ketorolac (TORADOL) injection 30 mg (30 mg Intravenous Given 1/10/22 1149)   piperacillin-tazobactam (ZOSYN) 3 375 g in sodium chloride 0 9 % 100 mL IVPB (0 g Intravenous Stopped 1/10/22 1436)       Diagnostic Studies  Results Reviewed     Procedure Component Value Units Date/Time    CBC and differential [[id number]] Collected: 01/10/22 1143    Lab Status: Final result Specimen: Blood from Arm, Right Updated: 01/10/22 1319     WBC 7 74 Thousand/uL      RBC 4 70 Million/uL      Hemoglobin 13 1 g/dL      Hematocrit 39 3 %      MCV 84 fL      MCH 27 9 pg      MCHC 33 3 g/dL      RDW 12 9 %      MPV 9 4 fL      Platelets 700 Thousands/uL      nRBC 0 /100 WBCs      Neutrophils Relative 60 %      Immat GRANS % 0 %      Lymphocytes Relative 26 %      Monocytes Relative 12 %      Eosinophils Relative 1 %      Basophils Relative 1 % Neutrophils Absolute 4 69 Thousands/µL      Immature Grans Absolute 0 02 Thousand/uL      Lymphocytes Absolute 2 01 Thousands/µL      Monocytes Absolute 0 93 Thousand/µL      Eosinophils Absolute 0 05 Thousand/µL      Basophils Absolute 0 04 Thousands/µL     Urine Microscopic [[id number]]  (Normal) Collected: 01/10/22 1229    Lab Status: Final result Specimen: Urine, Clean Catch Updated: 01/10/22 1249     RBC, UA 0-1 /hpf      WBC, UA 2-4 /hpf      Epithelial Cells Occasional /hpf      Bacteria, UA Occasional /hpf     Comprehensive metabolic panel [[id number]] Collected: 01/10/22 1143    Lab Status: Final result Specimen: Blood from Arm, Right Updated: 01/10/22 1246     Sodium 136 mmol/L      Potassium 3 7 mmol/L      Chloride 102 mmol/L      CO2 29 mmol/L      ANION GAP 5 mmol/L      BUN 11 mg/dL      Creatinine 1 14 mg/dL      Glucose 134 mg/dL      Calcium 8 9 mg/dL      AST 18 U/L      ALT 31 U/L      Alkaline Phosphatase 74 2 U/L      Total Protein 6 8 g/dL      Albumin 4 0 g/dL      Total Bilirubin 0 99 mg/dL      eGFR 73 ml/min/1 73sq m     Narrative:      [place] guidelines for Chronic Kidney Disease (CKD):     Stage 1 with normal or high GFR (GFR > 90 mL/min/1 73 square meters)    Stage 2 Mild CKD (GFR = 60-89 mL/min/1 73 square meters)    Stage 3A Moderate CKD (GFR = 45-59 mL/min/1 73 square meters)    Stage 3B Moderate CKD (GFR = 30-44 mL/min/1 73 square meters)    Stage 4 Severe CKD (GFR = 15-29 mL/min/1 73 square meters)    Stage 5 End Stage CKD (GFR <15 mL/min/1 73 square meters)  Note: GFR calculation is accurate only with a steady state creatinine    UA w Reflex to Microscopic w Reflex to Culture [[id number]]  (Abnormal) Collected: 01/10/22 1229    Lab Status: Final result Specimen: Urine, Clean Catch Updated: 01/10/22 1239     Color, UA Yellow     Clarity, UA Clear     Specific Gravity, UA 1 015     pH, UA 6 0     Leukocytes, UA Negative     Nitrite, UA Negative Protein, UA Negative mg/dl      Glucose, UA 3+ mg/dl      Ketones, UA Negative mg/dl      Urobilinogen, UA 1 0 E U /dl      Bilirubin, UA Negative     Blood, UA 1+                 CT renal stone study abdomen pelvis without contrast   Final Result by Olivia Briggs MD (01/10 4848)      Acute appendicitis, with tiny extraluminal air bubble indicative of perforation  No evidence of abscess or pneumoperitoneum  No urolithiasis or obstructive uropathy  Colonic diverticulosis without diverticulitis  I personally discussed this study with Dr Tere Halsted on 1/10/2022 at 12:48 PM                          Workstation performed: [id number]                    Procedures  Procedures         ED Course                                             MDM  Number of Diagnoses or Management Options  Diagnosis management comments: Consult with surgery Dr Jasmyn Sexton, who is in emergency department seeing patient, spoke with colleague, surgeon, Dr Federica Montiel at [facility], who accepts patient  Patient on Xarelto and took today, therefore, will wait to bringing patient to OR for now, patient stable, vital signs stable, no white count, no fever, not vomiting will transfer to [facility] for pending OR         Amount and/or Complexity of Data Reviewed  Clinical lab tests: ordered and reviewed  Tests in the radiology section of CPT®: ordered  Review and summarize past medical records: yes        Disposition  Final diagnoses:   Acute appendicitis with perforation and generalized peritonitis, without abscess or gangrene     Time reflects when diagnosis was documented in both MDM as applicable and the Disposition within this note     Time User Action Codes Description Comment    1/10/2022  1:33 PM Chance Vazquez Add [K35 20] Acute appendicitis with perforation and generalized peritonitis, without abscess or gangrene       ED Disposition     ED Disposition Condition Date/Time Comment    Transfer to Another [facility] Hema 10, 2022  1:33 PM Ovidio Gaffney should be transferred out to Miky ROMERO Documentation      Most Recent Value   Patient Condition The patient has been stabilized such that within reasonable medical probability, no material deterioration of the patient condition or the condition of the unborn child(sree) is likely to result from the transfer   Reason for Transfer Level of Care needed not available at this facility   Benefits of Transfer Specialized equipment and/or services available at the receiving facility (Include comment)________________________   Risks of Transfer Potential for delay in receiving treatment   Accepting Physician [address] Name, [place]    (Name & Tel number) German Núñez by [place] and Unit #) bls   Sending MD Dr Paula Gamez, [phone]      RN Documentation      [address] Name, Tucker    (Name & Tel number) German Núñez by [place] and Unit #) bls      Follow-up Information    None         Patient's Medications   Discharge Prescriptions    No medications on file       No discharge procedures on file      PDMP Review     None          ED Provider  Electronically Signed by           Lisa Jimenez PA-C  01/10/22 9873

## 2022-01-10 NOTE — CONSULTS
Consultation - General Surgery   Nancy Boss 46 y o  male MRN: [id number]  Unit/Bed#: ED 15 Encounter: [id number]    Assessment/Plan     Assessment:  Acute appendicitis with likely localized perforation with protein S deficiency with diabetes with patient on Xarelto  Patient has a normal white cell count  I reviewed the images of the CT scan myself  He does have an appendicolith which will warrant definitive surgical management  Will admit the patient at [address] [place] [address] due to patient requiring urgent surgery if he deteriorates  Keep him NPO  Continue IV antibiotics  Serial abdominal exam   Can be started on IV heparin drip tomorrow  Do not restart Xarelto  I personally discussed the case with Dr Meade Prior  He will be admitted under his service  Plan:  As above  History of Present Illness   History, ROS and PFSH unobtainable from any source due to   HPI:  Nancy Boss is a 46 y o  male who presents with 23-year-old male patient came to the emergency department with complaints of right lower quadrant pain  He says that he woke up with pain this morning  No complaints of nausea vomiting  No fever  Patient has protein S deficiency for which she takes Xarelto  He also takes metformin for diabetes  His past surgical history is significant for right inguinal hernia repair  He also had laparoscopic excision of a cyst from around his umbilicus  Consults    Review of Systems   Constitutional: Negative  HENT: Negative  Eyes: Negative  Respiratory: Negative  Cardiovascular: Negative  Gastrointestinal: Positive for abdominal pain  Endocrine: Negative  Genitourinary: Negative  Musculoskeletal: Negative  Skin: Negative  Allergic/Immunologic: Negative  Neurological: Negative  Hematological: Negative  Psychiatric/Behavioral: Negative          Historical Information   Past Medical History:   Diagnosis Date    Anemia     Anxiety     Arthritis     Asthma     Blood clot in vein 2017    right leg    Blood disorder     Chronic pain disorder     good control    Depression     Diabetes mellitus ([facility] )     type 2    Enlarged prostate     Epilepsy ([facility] )     past, last was 1 year ago    Hypertension     Psychiatric disorder     bipolar    Psychiatric illness     Psychosis ([facility] )     Sleep apnea     snores but hasn't been tested    Vertigo      Past Surgical History:   Procedure Laterality Date    CYST REMOVAL      ESOPHAGOGASTRODUODENOSCOPY N/A 11/9/2016    Procedure: ESOPHAGOGASTRODUODENOSCOPY (EGD); Surgeon: Jeramie Viramontes MD;  [facility]; Service:     ESOPHAGOGASTRODUODENOSCOPY N/A 9/2/2016    Procedure: ESOPHAGOGASTRODUODENOSCOPY (EGD); Surgeon: Jeramie Viramontes MD;  Location[facility];   Service:     HERNIA REPAIR       Social History   Social History     Substance and Sexual Activity   Alcohol Use Yes    Comment: occassional     Social History     Substance and Sexual Activity   Drug Use Yes    Types: Marijuana     E-Cigarette/Vaping    E-Cigarette Use Never User      E-Cigarette/Vaping Substances    Nicotine No     THC No     CBD No     Flavoring No     Other No     Unknown No      Social History     Tobacco Use   Smoking Status Current Some Day Smoker    Packs/day: 0 25    Years: 15 00    Pack years: 3 75    Types: Cigarettes   Smokeless Tobacco Current User   Tobacco Comment    pack a month     Family History:   Family History   Problem Relation Age of Onset    No Known Problems Mother     Colon cancer Father 61    No Known Problems Sister     No Known Problems Brother     No Known Problems Maternal Aunt     No Known Problems Paternal Aunt     No Known Problems Maternal Uncle     No Known Problems Paternal Uncle     No Known Problems Maternal Grandfather     No Known Problems Maternal Grandmother     No Known Problems Paternal Grandfather     Hypertension Paternal Grandmother     No Known Problems Cousin     Prostate cancer Family     Stroke Family     Diabetes Family         MELLITUS    Coronary artery disease Family        Meds/Allergies   all current active meds have been reviewed, current meds:   No current facility-administered medications for this encounter  and PTA meds:   Prior to Admission Medications   Prescriptions Last Dose Informant Patient Reported? Taking?    ACCU-CHEK FASTCLIX LANCETS MISC   No No   Sig: by Does not apply route daily   ACCU-CHEK GUIDE test strip   Yes No   Blood Glucose Monitoring Suppl (ACCU-CHEK GUIDE) w/Device KIT   No No   Sig: by Does not apply route daily   albuterol (ProAir HFA) 90 mcg/act inhaler   No No   Sig: Inhale 2 puffs every 4 (four) hours as needed for wheezing or shortness of breath   atorvastatin (LIPITOR) 10 mg tablet   No No   Sig: Take 1 tablet (10 mg total) by mouth daily   emtricitabine-tenofovir (Truvada) 200-300 mg per tablet   No No   Sig: Take 1 tablet by mouth every 24 hours   isoniazid (NYDRAZID) 100 mg tablet  Self Yes No   Sig: Take 100 mg by mouth daily   metFORMIN (GLUCOPHAGE) 1000 MG tablet   No No   Sig: Take 1 tablet (1,000 mg total) by mouth 2 (two) times a day with meals   pyridoxine (VITAMIN B6) 100 mg tablet  Self Yes No   Sig: Take 100 mg by mouth daily   rivaroxaban (Xarelto) 20 mg tablet   No No   Sig: Take 1 tablet (20 mg total) by mouth every 24 hours      Facility-Administered Medications: None     Allergies   Allergen Reactions    Fish-Derived Products - Food Allergy      Cod    Nuts - Food Allergy      Walnuts      Pollen Extract     Warfarin      Panic attacks    Watermelon [Citrullus Vulgaris]        Objective   First Vitals:   Blood Pressure: 96/64 (01/10/22 1110)  Pulse: 95 (01/10/22 1110)  Temperature: 97 6 °F (36 4 °C) (01/10/22 1110)  Temp Source: Oral (01/10/22 1110)  Respirations: 18 (01/10/22 1110)  Height: 5' 10" (177 8 cm) (01/10/22 1110)  Weight - Scale: 84 7 kg (186 lb 11 7 oz) (01/10/22 1110)  SpO2: 94 % (01/10/22 1110)    Current Vitals:   Blood Pressure: 108/66 (01/10/22 1338)  Pulse: 55 (01/10/22 1338)  Temperature: 98 1 °F (36 7 °C) (01/10/22 1338)  Temp Source: Oral (01/10/22 1338)  Respirations: 16 (01/10/22 1338)  Height: 5' 10" (177 8 cm) (01/10/22 1110)  Weight - Scale: 84 7 kg (186 lb 11 7 oz) (01/10/22 1110)  SpO2: 100 % (01/10/22 1338)      Intake/Output Summary (Last 24 hours) at 1/10/2022 1506  Last data filed at 1/10/2022 1436  Gross per 24 hour   Intake 200 ml   Output --   Net 200 ml       Invasive Devices  Report    Peripheral Intravenous Line            Peripheral IV 01/10/22 Right Antecubital <1 day                Physical Exam  Vitals reviewed  Constitutional:       Appearance: Normal appearance  HENT:      Head: Normocephalic  Nose: Nose normal       Mouth/Throat:      Mouth: Mucous membranes are moist    Eyes:      Pupils: Pupils are equal, round, and reactive to light  Cardiovascular:      Rate and Rhythm: Normal rate and regular rhythm  Pulses: Normal pulses  Heart sounds: Normal heart sounds  Pulmonary:      Effort: Pulmonary effort is normal       Breath sounds: Normal breath sounds  Abdominal:      General: There is no distension  Palpations: Abdomen is soft  There is no mass  Tenderness: There is abdominal tenderness (Right lower quadrant)  There is guarding  There is no rebound  Hernia: No hernia is present  Genitourinary:     Penis: Normal        Testes: Normal    Musculoskeletal:      Cervical back: Normal range of motion  Skin:     General: Skin is warm  Capillary Refill: Capillary refill takes less than 2 seconds  Neurological:      General: No focal deficit present  Mental Status: He is alert and oriented to person, place, and time  Psychiatric:         Mood and Affect: Mood normal          Behavior: Behavior normal          Lab Results:   I have personally reviewed pertinent lab results    , CBC:   Lab Results   Component Value Date    WBC 7 74 01/10/2022    HGB 13 1 01/10/2022    HCT 39 3 01/10/2022    MCV 84 01/10/2022     01/10/2022    MCH 27 9 01/10/2022    MCHC 33 3 01/10/2022    RDW 12 9 01/10/2022    MPV 9 4 01/10/2022    NRBC 0 01/10/2022   , CMP:   Lab Results   Component Value Date    SODIUM 136 01/10/2022    K 3 7 01/10/2022     01/10/2022    CO2 29 01/10/2022    BUN 11 01/10/2022    CREATININE 1 14 01/10/2022    CALCIUM 8 9 01/10/2022    AST 18 01/10/2022    ALT 31 01/10/2022    ALKPHOS 74 2 01/10/2022    EGFR 73 01/10/2022   , Coagulation: No results found for: PT, INR, APTT, Urinalysis:   Lab Results   Component Value Date    COLORU Yellow 01/10/2022    CLARITYU Clear 01/10/2022    SPECGRAV 1 015 01/10/2022    PHUR 6 0 01/10/2022    LEUKOCYTESUR Negative 01/10/2022    NITRITE Negative 01/10/2022    GLUCOSEU 3+ (A) 01/10/2022    KETONESU Negative 01/10/2022    BILIRUBINUR Negative 01/10/2022    BLOODU 1+ (A) 01/10/2022   , Amylase: No results found for: AMYLASE, Lipase: No results found for: LIPASE  Imaging: I have personally reviewed pertinent reports  and I have personally reviewed pertinent films in PACS  EKG, Pathology, and Other Studies: I have personally reviewed pertinent reports  and I have personally reviewed pertinent films in PACS    Counseling / Coordination of Care  Total floor / unit time spent today 30 minutes  Greater than 50% of total time was spent with the patient and / or family counseling and / or coordination of care  A description of the counseling / coordination of care: 10

## 2022-01-10 NOTE — ED NOTES
Pt heard on phone trying to convince friend to bring in food for him  Provider at bedside and explained rationale for continued NPO status    Pt not happy about it but states understanding     Krishna Fregoso RN  01/10/22 1970

## 2022-01-11 ENCOUNTER — ANESTHESIA (INPATIENT)
Dept: PERIOP | Facility: HOSPITAL | Age: 52
DRG: 339 | End: 2022-01-11
Payer: COMMERCIAL

## 2022-01-11 ENCOUNTER — ANESTHESIA EVENT (INPATIENT)
Dept: PERIOP | Facility: HOSPITAL | Age: 52
DRG: 339 | End: 2022-01-11
Payer: COMMERCIAL

## 2022-01-11 PROBLEM — Z79.01 CURRENT USE OF LONG TERM ANTICOAGULATION: Status: ACTIVE | Noted: 2022-01-11

## 2022-01-11 PROBLEM — K35.80 ACUTE APPENDICITIS: Status: ACTIVE | Noted: 2022-01-11

## 2022-01-11 PROBLEM — K35.32 ACUTE PERFORATED APPENDICITIS: Status: ACTIVE | Noted: 2022-01-11

## 2022-01-11 LAB
ALBUMIN SERPL BCP-MCNC: 3 G/DL (ref 3.5–5)
ALP SERPL-CCNC: 83 U/L (ref 46–116)
ALT SERPL W P-5'-P-CCNC: 33 U/L (ref 12–78)
ANION GAP SERPL CALCULATED.3IONS-SCNC: 8 MMOL/L (ref 4–13)
AST SERPL W P-5'-P-CCNC: 15 U/L (ref 5–45)
BASOPHILS # BLD AUTO: 0.03 THOUSANDS/ΜL (ref 0–0.1)
BASOPHILS NFR BLD AUTO: 0 % (ref 0–1)
BILIRUB SERPL-MCNC: 2.18 MG/DL (ref 0.2–1)
BUN SERPL-MCNC: 10 MG/DL (ref 5–25)
CALCIUM ALBUM COR SERPL-MCNC: 8.9 MG/DL (ref 8.3–10.1)
CALCIUM SERPL-MCNC: 8.1 MG/DL (ref 8.3–10.1)
CHLORIDE SERPL-SCNC: 103 MMOL/L (ref 100–108)
CO2 SERPL-SCNC: 25 MMOL/L (ref 21–32)
CREAT SERPL-MCNC: 1.15 MG/DL (ref 0.6–1.3)
EOSINOPHIL # BLD AUTO: 0.04 THOUSAND/ΜL (ref 0–0.61)
EOSINOPHIL NFR BLD AUTO: 0 % (ref 0–6)
ERYTHROCYTE [DISTWIDTH] IN BLOOD BY AUTOMATED COUNT: 12.8 % (ref 11.6–15.1)
FLUAV RNA RESP QL NAA+PROBE: NEGATIVE
FLUBV RNA RESP QL NAA+PROBE: NEGATIVE
GFR SERPL CREATININE-BSD FRML MDRD: 72 ML/MIN/1.73SQ M
GLUCOSE SERPL-MCNC: 146 MG/DL (ref 65–140)
GLUCOSE SERPL-MCNC: 152 MG/DL (ref 65–140)
HCT VFR BLD AUTO: 38 % (ref 36.5–49.3)
HGB BLD-MCNC: 12.4 G/DL (ref 12–17)
IMM GRANULOCYTES # BLD AUTO: 0.03 THOUSAND/UL (ref 0–0.2)
IMM GRANULOCYTES NFR BLD AUTO: 0 % (ref 0–2)
LYMPHOCYTES # BLD AUTO: 1.67 THOUSANDS/ΜL (ref 0.6–4.47)
LYMPHOCYTES NFR BLD AUTO: 16 % (ref 14–44)
MCH RBC QN AUTO: 28.1 PG (ref 26.8–34.3)
MCHC RBC AUTO-ENTMCNC: 32.6 G/DL (ref 31.4–37.4)
MCV RBC AUTO: 86 FL (ref 82–98)
MONOCYTES # BLD AUTO: 0.89 THOUSAND/ΜL (ref 0.17–1.22)
MONOCYTES NFR BLD AUTO: 9 % (ref 4–12)
NEUTROPHILS # BLD AUTO: 7.5 THOUSANDS/ΜL (ref 1.85–7.62)
NEUTS SEG NFR BLD AUTO: 75 % (ref 43–75)
NRBC BLD AUTO-RTO: 0 /100 WBCS
PLATELET # BLD AUTO: 290 THOUSANDS/UL (ref 149–390)
PMV BLD AUTO: 9.4 FL (ref 8.9–12.7)
POTASSIUM SERPL-SCNC: 3.6 MMOL/L (ref 3.5–5.3)
PROT SERPL-MCNC: 6.6 G/DL (ref 6.4–8.2)
RBC # BLD AUTO: 4.42 MILLION/UL (ref 3.88–5.62)
RSV RNA RESP QL NAA+PROBE: NEGATIVE
SARS-COV-2 RNA RESP QL NAA+PROBE: NEGATIVE
SODIUM SERPL-SCNC: 136 MMOL/L (ref 136–145)
WBC # BLD AUTO: 10.16 THOUSAND/UL (ref 4.31–10.16)

## 2022-01-11 PROCEDURE — 0241U HB NFCT DS VIR RESP RNA 4 TRGT: CPT | Performed by: PHYSICIAN ASSISTANT

## 2022-01-11 PROCEDURE — 85025 COMPLETE CBC W/AUTO DIFF WBC: CPT | Performed by: SURGERY

## 2022-01-11 PROCEDURE — 44970 LAPAROSCOPY APPENDECTOMY: CPT | Performed by: SPECIALIST

## 2022-01-11 PROCEDURE — 88304 TISSUE EXAM BY PATHOLOGIST: CPT | Performed by: PATHOLOGY

## 2022-01-11 PROCEDURE — 80053 COMPREHEN METABOLIC PANEL: CPT | Performed by: SURGERY

## 2022-01-11 PROCEDURE — 99222 1ST HOSP IP/OBS MODERATE 55: CPT | Performed by: PHYSICIAN ASSISTANT

## 2022-01-11 PROCEDURE — 0DTJ4ZZ RESECTION OF APPENDIX, PERCUTANEOUS ENDOSCOPIC APPROACH: ICD-10-PCS | Performed by: SPECIALIST

## 2022-01-11 PROCEDURE — 82948 REAGENT STRIP/BLOOD GLUCOSE: CPT

## 2022-01-11 RX ORDER — NEOSTIGMINE METHYLSULFATE 1 MG/ML
INJECTION INTRAVENOUS AS NEEDED
Status: DISCONTINUED | OUTPATIENT
Start: 2022-01-11 | End: 2022-01-11

## 2022-01-11 RX ORDER — TAMSULOSIN HYDROCHLORIDE 0.4 MG/1
0.4 CAPSULE ORAL ONCE
Status: COMPLETED | OUTPATIENT
Start: 2022-01-11 | End: 2022-01-11

## 2022-01-11 RX ORDER — GLYCOPYRROLATE 0.2 MG/ML
INJECTION INTRAMUSCULAR; INTRAVENOUS AS NEEDED
Status: DISCONTINUED | OUTPATIENT
Start: 2022-01-11 | End: 2022-01-11

## 2022-01-11 RX ORDER — PROPOFOL 10 MG/ML
INJECTION, EMULSION INTRAVENOUS AS NEEDED
Status: DISCONTINUED | OUTPATIENT
Start: 2022-01-11 | End: 2022-01-11

## 2022-01-11 RX ORDER — FENTANYL CITRATE 50 UG/ML
INJECTION, SOLUTION INTRAMUSCULAR; INTRAVENOUS AS NEEDED
Status: DISCONTINUED | OUTPATIENT
Start: 2022-01-11 | End: 2022-01-11

## 2022-01-11 RX ORDER — MAGNESIUM HYDROXIDE 1200 MG/15ML
LIQUID ORAL AS NEEDED
Status: DISCONTINUED | OUTPATIENT
Start: 2022-01-11 | End: 2022-01-11 | Stop reason: HOSPADM

## 2022-01-11 RX ORDER — ONDANSETRON 2 MG/ML
4 INJECTION INTRAMUSCULAR; INTRAVENOUS ONCE AS NEEDED
Status: DISCONTINUED | OUTPATIENT
Start: 2022-01-11 | End: 2022-01-11 | Stop reason: HOSPADM

## 2022-01-11 RX ORDER — SODIUM CHLORIDE, SODIUM LACTATE, POTASSIUM CHLORIDE, AND CALCIUM CHLORIDE .6; .31; .03; .02 G/100ML; G/100ML; G/100ML; G/100ML
IRRIGANT IRRIGATION AS NEEDED
Status: DISCONTINUED | OUTPATIENT
Start: 2022-01-11 | End: 2022-01-11 | Stop reason: HOSPADM

## 2022-01-11 RX ORDER — LANOLIN ALCOHOL/MO/W.PET/CERES
100 CREAM (GRAM) TOPICAL DAILY
Status: DISCONTINUED | OUTPATIENT
Start: 2022-01-11 | End: 2022-01-12 | Stop reason: HOSPADM

## 2022-01-11 RX ORDER — MIDAZOLAM HYDROCHLORIDE 2 MG/2ML
INJECTION, SOLUTION INTRAMUSCULAR; INTRAVENOUS AS NEEDED
Status: DISCONTINUED | OUTPATIENT
Start: 2022-01-11 | End: 2022-01-11

## 2022-01-11 RX ORDER — DEXAMETHASONE SODIUM PHOSPHATE 4 MG/ML
INJECTION, SOLUTION INTRA-ARTICULAR; INTRALESIONAL; INTRAMUSCULAR; INTRAVENOUS; SOFT TISSUE AS NEEDED
Status: DISCONTINUED | OUTPATIENT
Start: 2022-01-11 | End: 2022-01-11

## 2022-01-11 RX ORDER — FOLIC ACID 1 MG/1
1 TABLET ORAL DAILY
Status: DISCONTINUED | OUTPATIENT
Start: 2022-01-11 | End: 2022-01-12 | Stop reason: HOSPADM

## 2022-01-11 RX ORDER — FENTANYL CITRATE/PF 50 MCG/ML
50 SYRINGE (ML) INJECTION
Status: DISCONTINUED | OUTPATIENT
Start: 2022-01-11 | End: 2022-01-11 | Stop reason: HOSPADM

## 2022-01-11 RX ORDER — HYDROMORPHONE HCL/PF 1 MG/ML
SYRINGE (ML) INJECTION AS NEEDED
Status: DISCONTINUED | OUTPATIENT
Start: 2022-01-11 | End: 2022-01-11

## 2022-01-11 RX ORDER — NICOTINE 21 MG/24HR
21 PATCH, TRANSDERMAL 24 HOURS TRANSDERMAL DAILY
Status: DISCONTINUED | OUTPATIENT
Start: 2022-01-11 | End: 2022-01-12 | Stop reason: HOSPADM

## 2022-01-11 RX ORDER — BUPIVACAINE HYDROCHLORIDE AND EPINEPHRINE 5; 5 MG/ML; UG/ML
INJECTION, SOLUTION PERINEURAL AS NEEDED
Status: DISCONTINUED | OUTPATIENT
Start: 2022-01-11 | End: 2022-01-11 | Stop reason: HOSPADM

## 2022-01-11 RX ORDER — ONDANSETRON 2 MG/ML
INJECTION INTRAMUSCULAR; INTRAVENOUS AS NEEDED
Status: DISCONTINUED | OUTPATIENT
Start: 2022-01-11 | End: 2022-01-11

## 2022-01-11 RX ORDER — SODIUM CHLORIDE, SODIUM LACTATE, POTASSIUM CHLORIDE, CALCIUM CHLORIDE 600; 310; 30; 20 MG/100ML; MG/100ML; MG/100ML; MG/100ML
100 INJECTION, SOLUTION INTRAVENOUS CONTINUOUS
Status: DISCONTINUED | OUTPATIENT
Start: 2022-01-11 | End: 2022-01-12

## 2022-01-11 RX ORDER — ACETAMINOPHEN 325 MG/1
650 TABLET ORAL ONCE
Status: COMPLETED | OUTPATIENT
Start: 2022-01-11 | End: 2022-01-11

## 2022-01-11 RX ORDER — ROCURONIUM BROMIDE 10 MG/ML
INJECTION, SOLUTION INTRAVENOUS AS NEEDED
Status: DISCONTINUED | OUTPATIENT
Start: 2022-01-11 | End: 2022-01-11

## 2022-01-11 RX ORDER — LIDOCAINE HYDROCHLORIDE 10 MG/ML
INJECTION, SOLUTION EPIDURAL; INFILTRATION; INTRACAUDAL; PERINEURAL AS NEEDED
Status: DISCONTINUED | OUTPATIENT
Start: 2022-01-11 | End: 2022-01-11

## 2022-01-11 RX ADMIN — HEPARIN SODIUM 5000 UNITS: 5000 INJECTION INTRAVENOUS; SUBCUTANEOUS at 14:16

## 2022-01-11 RX ADMIN — TAMSULOSIN HYDROCHLORIDE 0.4 MG: 0.4 CAPSULE ORAL at 14:16

## 2022-01-11 RX ADMIN — MIDAZOLAM 2 MG: 1 INJECTION INTRAMUSCULAR; INTRAVENOUS at 10:37

## 2022-01-11 RX ADMIN — PROPOFOL 200 MG: 10 INJECTION, EMULSION INTRAVENOUS at 10:41

## 2022-01-11 RX ADMIN — PIPERACILLIN AND TAZOBACTAM 3.38 G: 36; 4.5 INJECTION, POWDER, FOR SOLUTION INTRAVENOUS at 03:45

## 2022-01-11 RX ADMIN — HYDROMORPHONE HYDROCHLORIDE 1 MG: 1 INJECTION, SOLUTION INTRAMUSCULAR; INTRAVENOUS; SUBCUTANEOUS at 14:36

## 2022-01-11 RX ADMIN — FENTANYL CITRATE 50 MCG: 50 INJECTION, SOLUTION INTRAMUSCULAR; INTRAVENOUS at 10:54

## 2022-01-11 RX ADMIN — PIPERACILLIN AND TAZOBACTAM 3.38 G: 36; 4.5 INJECTION, POWDER, FOR SOLUTION INTRAVENOUS at 14:15

## 2022-01-11 RX ADMIN — SODIUM CHLORIDE, SODIUM LACTATE, POTASSIUM CHLORIDE, AND CALCIUM CHLORIDE: .6; .31; .03; .02 INJECTION, SOLUTION INTRAVENOUS at 12:01

## 2022-01-11 RX ADMIN — HYDROMORPHONE HYDROCHLORIDE 1 MG: 1 INJECTION, SOLUTION INTRAMUSCULAR; INTRAVENOUS; SUBCUTANEOUS at 05:38

## 2022-01-11 RX ADMIN — PROPOFOL 100 MG: 10 INJECTION, EMULSION INTRAVENOUS at 12:02

## 2022-01-11 RX ADMIN — PROPOFOL 100 MG: 10 INJECTION, EMULSION INTRAVENOUS at 10:44

## 2022-01-11 RX ADMIN — ACETAMINOPHEN 650 MG: 325 TABLET, FILM COATED ORAL at 20:01

## 2022-01-11 RX ADMIN — FENTANYL CITRATE 50 MCG: 50 INJECTION INTRAMUSCULAR; INTRAVENOUS at 12:50

## 2022-01-11 RX ADMIN — PIPERACILLIN AND TAZOBACTAM 3.38 G: 36; 4.5 INJECTION, POWDER, FOR SOLUTION INTRAVENOUS at 20:49

## 2022-01-11 RX ADMIN — HYDROMORPHONE HYDROCHLORIDE 1 MG: 1 INJECTION, SOLUTION INTRAMUSCULAR; INTRAVENOUS; SUBCUTANEOUS at 23:33

## 2022-01-11 RX ADMIN — FENTANYL CITRATE 50 MCG: 50 INJECTION, SOLUTION INTRAMUSCULAR; INTRAVENOUS at 11:03

## 2022-01-11 RX ADMIN — PIPERACILLIN AND TAZOBACTAM 3.38 G: 36; 4.5 INJECTION, POWDER, FOR SOLUTION INTRAVENOUS at 08:16

## 2022-01-11 RX ADMIN — SODIUM CHLORIDE, SODIUM LACTATE, POTASSIUM CHLORIDE, AND CALCIUM CHLORIDE 100 ML/HR: .6; .31; .03; .02 INJECTION, SOLUTION INTRAVENOUS at 14:16

## 2022-01-11 RX ADMIN — FENTANYL CITRATE 50 MCG: 50 INJECTION INTRAMUSCULAR; INTRAVENOUS at 13:10

## 2022-01-11 RX ADMIN — FENTANYL CITRATE 100 MCG: 50 INJECTION, SOLUTION INTRAMUSCULAR; INTRAVENOUS at 10:41

## 2022-01-11 RX ADMIN — ROCURONIUM BROMIDE 50 MG: 10 INJECTION, SOLUTION INTRAVENOUS at 10:41

## 2022-01-11 RX ADMIN — LIDOCAINE HYDROCHLORIDE 50 MG: 10 INJECTION, SOLUTION EPIDURAL; INFILTRATION; INTRACAUDAL; PERINEURAL at 10:41

## 2022-01-11 RX ADMIN — ONDANSETRON 4 MG: 2 INJECTION INTRAMUSCULAR; INTRAVENOUS at 10:54

## 2022-01-11 RX ADMIN — SODIUM CHLORIDE, SODIUM LACTATE, POTASSIUM CHLORIDE, AND CALCIUM CHLORIDE 125 ML/HR: .6; .31; .03; .02 INJECTION, SOLUTION INTRAVENOUS at 05:38

## 2022-01-11 RX ADMIN — GLYCOPYRROLATE 0.4 MG: 0.2 INJECTION, SOLUTION INTRAMUSCULAR; INTRAVENOUS at 11:43

## 2022-01-11 RX ADMIN — HYDROMORPHONE HYDROCHLORIDE 1 MG: 1 INJECTION, SOLUTION INTRAMUSCULAR; INTRAVENOUS; SUBCUTANEOUS at 11:37

## 2022-01-11 RX ADMIN — HEPARIN SODIUM 5000 UNITS: 5000 INJECTION INTRAVENOUS; SUBCUTANEOUS at 21:21

## 2022-01-11 RX ADMIN — NEOSTIGMINE METHYLSULFATE 3 MG: 1 INJECTION INTRAVENOUS at 11:43

## 2022-01-11 RX ADMIN — PROPOFOL 100 MG: 10 INJECTION, EMULSION INTRAVENOUS at 10:48

## 2022-01-11 RX ADMIN — HEPARIN SODIUM 5000 UNITS: 5000 INJECTION INTRAVENOUS; SUBCUTANEOUS at 05:38

## 2022-01-11 RX ADMIN — DEXAMETHASONE SODIUM PHOSPHATE 4 MG: 4 INJECTION, SOLUTION INTRA-ARTICULAR; INTRALESIONAL; INTRAMUSCULAR; INTRAVENOUS; SOFT TISSUE at 10:54

## 2022-01-11 RX ADMIN — NICOTINE 21 MG: 21 PATCH, EXTENDED RELEASE TRANSDERMAL at 14:16

## 2022-01-11 NOTE — UTILIZATION REVIEW
Initial Clinical Review    Admission: Date/Time/Statement:   Inpatient Admission (Order [id number])  Admission  Date: 1/11/2022 Department: [address] [place] [address] [address] Released By/Authorizing: Carlos Valencia MD (auto-released)       Order Information    Order Name   INPATIENT ADMISSION [263]     Question Answer   Level of Care Med Surg   Estimated length of stay More than 2 Midnights   Certification I certify that inpatient services are medically necessary for this patient for a duration of greater than two midnights  See H&P and MD Progress Notes for additional information about the patient's course of treatment  Initial Presentation: 64-year-old male patient came to the emergency department with complaints of right lower quadrant pain   He says that he woke up with pain this morning   No complaints of nausea vomiting   No fever   Patient has protein S deficiency for which she takes Luis Alfredo Gess also takes metformin for diabetes   His past surgical history is significant for right inguinal hernia repair  [facility] also had laparoscopic excision of a cyst from around his umbilicus  Assessment: Transfer from [facility] ED   Acute appendicitis with likely localized perforation with protein S deficiency with diabetes with patient on Jolanta Nine has a normal white cell count   I reviewed the images of the CT scan myself  [facility] does have an appendicolith which will warrant definitive surgical management   Will admit the patient at [facility] due to patient requiring urgent surgery if he deteriorates  Erla Couch him NPO   Continue IV antibiotics   Serial abdominal exam   Can be started on IV heparin drip tomorrow   Do not restart Xarelto   I personally discussed the case with Dr Lebron Albert will be admitted under his service    Date: 1/11/22   Day 2:   OP NOTE:   Procedure(s):  APPENDECTOMY LAPAROSCOPIC  The appendix was curved and there were multiple adhesions were markedly inflamed appendix was enlarged and completely covered with cecum  these adhesions which were carefully dissected with the harmonic scalpel, mesoappendix was  cauterized and cut with the Harmonic scalpel and the base of the appendix was markedly dilated  and was very inflammed , the right colon and cecum was slightly mobilized and appendix was stapled and cut with a cuff of cecum with ECHELON FLEX 60 Powered plus Stapler The appendix was then placed into Endo pouch and was delivered through the umbilical incision  The appendix a break at the mid the body near the so to endo-pouch were used to place the appendix and the fecal matter   Abdominal cavity was thoroughly lavaged with copious amounts of fluid for irrigation mixed with antibiotics particularly pelvis and right paracolic gutter and subhepatic space and subdiaphragmatic space were lavaged Base of appendix sites was re inspected staple line was intact and no active bleeding was noted  Earnest Pruett was placed in Rt Paracolic gutter and pelvis through Supra pubic 5 mm port site  Drain was secured with 3-0 Nylon sutureThe 10 mm port site was closed in two layers with the figure-of-eight Vicryl, and the skin was approximated with subcuticular Monocryl 4-0  All the 5 mm ports were closed in single layer subcuticular Monocryl   All the ports were thoroughly infiltrated with Marcaine with epinephrine     Returned to unit   Continue IVF , IV Zosyn pain mgt  ED Triage Vitals   Temperature Pulse Respirations Blood Pressure SpO2   01/10/22 2006 01/10/22 2006 01/10/22 2006 01/10/22 2006 01/10/22 2006   98 1 °F (36 7 °C) 65 18 124/75 96 %      Temp src Heart Rate Source Patient Position - Orthostatic VS BP Location FiO2 (%)   -- -- -- -- --             Pain Score       01/10/22 2009       3          Wt Readings from Last 1 Encounters:   01/10/22 83 7 kg (184 lb 8 4 oz)     Additional Vital Signs:   01/11/22 08:15:11 98 1 °F (36 7 °C) 82 20 125/84 98 96 % None (Room air) Lying   01/11/22 05:52:01 100 °F (37 8 °C) 87 16 126/84 98 96 % None (Room air) --   01/11/22 03:43:32 100 °F (37 8 °C) 86 16 124/85 98 96 % None (Room air) --   01/10/22 22:49:56 98 9 °F (37 2 °C) 78 16 107/74 85 96 %       Pertinent Labs/Diagnostic Test Results:   1/10/22 CT renal stone study abd pelvis Acute appendicitis, with tiny extraluminal air bubble indicative of perforation   No evidence of abscess or pneumoperitoneum  No urolithiasis or obstructive uropathy  Colonic diverticulosis without diverticulitis     Results from last 7 days   Lab Units 01/11/22  0607 01/10/22  1143   WBC Thousand/uL 10 16 7 74   HEMOGLOBIN g/dL 12 4 13 1   HEMATOCRIT % 38 0 39 3   PLATELETS Thousands/uL 290 308   NEUTROS ABS Thousands/µL 7 50 4 69     Results from last 7 days   Lab Units 01/11/22  0607 01/10/22  1143   SODIUM mmol/L 136 136   POTASSIUM mmol/L 3 6 3 7   CHLORIDE mmol/L 103 102   CO2 mmol/L 25 29   ANION GAP mmol/L 8 5   BUN mg/dL 10 11   CREATININE mg/dL 1 15 1 14   EGFR ml/min/1 73sq m 72 73   CALCIUM mg/dL 8 1* 8 9     Results from last 7 days   Lab Units 01/11/22  0607 01/10/22  1143   AST U/L 15 18   ALT U/L 33 31   ALK PHOS U/L 83 74 2   TOTAL PROTEIN g/dL 6 6 6 8   ALBUMIN g/dL 3 0* 4 0   TOTAL BILIRUBIN mg/dL 2 18* 0 99     Results from last 7 days   Lab Units 01/11/22  0607 01/10/22  1143   GLUCOSE RANDOM mg/dL 146* 134     Results from last 7 days   Lab Units 01/10/22  1229   CLARITY UA  Clear   COLOR UA  Yellow   SPEC GRAV UA  1 015   PH UA  6 0   GLUCOSE UA mg/dl 3+*   KETONES UA mg/dl Negative   BLOOD UA  1+*   PROTEIN UA mg/dl Negative   NITRITE UA  Negative   BILIRUBIN UA  Negative   UROBILINOGEN UA E U /dl 1 0   LEUKOCYTES UA  Negative   WBC UA /hpf 2-4   RBC UA /hpf 0-1   BACTERIA UA /hpf Occasional   EPITHELIAL CELLS WET PREP /hpf Occasional       Past Medical History:   Diagnosis Date    Anemia     Anxiety     Arthritis     Asthma     Blood clot in vein 2017    right leg    Blood disorder     Chronic pain disorder     good control    Depression     Diabetes mellitus ([facility] )     type 2    Enlarged prostate     Epilepsy ([facility] )     past, last was 1 year ago    Hypertension     Psychiatric disorder     bipolar    Psychiatric illness     Psychosis [facility]     Sleep apnea     snores but hasn't been tested    Vertigo      Present on Admission:  **None**      Admitting Diagnosis: Acute appendicitis [K35 80]  Age/Sex: 46 y o  male  Admission Orders:  gmf  Npo  ciwa  I/o  OR  IS  Scheduled Medications:  heparin (porcine), 5,000 Units, Subcutaneous, Q8H EDILMA  piperacillin-tazobactam, 3 375 g, Intravenous, Q6H      Continuous IV Infusions:  lactated ringers, 125 mL/hr, Intravenous, Continuous      PRN Meds:  HYDROmorphone, 1 mg, Intravenous, Q6H PRN  ondansetron, 4 mg, Intravenous, Q6H PRN        None    Network Utilization Review Department  ATTENTION: Please call with any questions or concerns to [phone] and carefully listen to the prompts so that you are directed to the right person  All voicemails are confidential   Ty Limb all requests for admission clinical reviews, approved or denied determinations and any other requests to dedicated fax number below belonging to the campus where the patient is receiving treatment   List of dedicated fax numbers for the Facilities:  [address] DENIALS (Administrative/Medical Necessity) [phone]   [address] (Maternity/NICU/Pediatrics) [phone]   [address] [place] [address]  [address] [address] [address] [address] [place] [address] [phone]   Franco Rebolledo [address] [place] [address] [address] [place] [address] [phone]

## 2022-01-11 NOTE — ED NOTES
Report called to [facility] at [address] [place] [address]  01/10/22 [id number]

## 2022-01-11 NOTE — H&P
Assessment/Plan      Assessment:  Acute appendicitis with likely localized perforation with protein S deficiency with diabetes with patient on Xarelto  Patient has a normal white cell count  I reviewed the images of the CT scan myself  He does have an appendicolith which will warrant definitive surgical management  Will admit the patient at [address] [place] [address] due to patient requiring urgent surgery if he deteriorates  Keep him NPO  Continue IV antibiotics  Serial abdominal exam   Can be started on IV heparin drip tomorrow  Do not restart Xarelto  I personally discussed the case with Dr Felicity Tate  He will be admitted under his service  Plan:  As above  History of Present Illness         History, ROS and PFSH unobtainable from any source due to   HPI:  Minerva Roy is a 46 y o  male who presents with 57-year-old male patient came to the emergency department with complaints of right lower quadrant pain  He says that he woke up with pain this morning  No complaints of nausea vomiting  No fever  Patient has protein S deficiency for which she takes Xarelto  He also takes metformin for diabetes  His past surgical history is significant for right inguinal hernia repair  He also had laparoscopic excision of a cyst from around his umbilicus      Consults     Review of Systems   Constitutional: Negative  HENT: Negative  Eyes: Negative  Respiratory: Negative  Cardiovascular: Negative  Gastrointestinal: Positive for abdominal pain  Endocrine: Negative  Genitourinary: Negative  Musculoskeletal: Negative  Skin: Negative  Allergic/Immunologic: Negative  Neurological: Negative  Hematological: Negative      Psychiatric/Behavioral: Negative           Historical Information         Medical History        Past Medical History:   Diagnosis Date    Anemia      Anxiety      Arthritis      Asthma      Blood clot in vein 2017     right leg    Blood disorder      Chronic pain disorder     good control    Depression      Diabetes mellitus ([facility] )       type 2    Enlarged prostate      [facility]       past, last was 1 year ago    Hypertension      Psychiatric disorder       bipolar    Psychiatric illness      Psychosis ([facility] )      Sleep apnea       snores but hasn't been tested    Vertigo           Surgical History         Past Surgical History:   Procedure Laterality Date    CYST REMOVAL        ESOPHAGOGASTRODUODENOSCOPY N/A 11/9/2016     Procedure: ESOPHAGOGASTRODUODENOSCOPY (EGD); Surgeon: Noreen Pineda MD;  Location: [facility]; Service:     ESOPHAGOGASTRODUODENOSCOPY N/A 9/2/2016     Procedure: ESOPHAGOGASTRODUODENOSCOPY (EGD); Surgeon: Noreen Pineda MD;  Location: [facility];   Service:     HERNIA REPAIR             Social History         Social History           Substance and Sexual Activity   Alcohol Use Yes     Comment: occassional      Social History           Substance and Sexual Activity   Drug Use Yes    Types: Marijuana            E-Cigarette/Vaping    E-Cigarette Use Never User              E-Cigarette/Vaping Substances    Nicotine No      THC No      CBD No      Flavoring No      Other No      Unknown No        Social History           Tobacco Use   Smoking Status Current Some Day Smoker    Packs/day: 0 25    Years: 15 00    Pack years: 3 75    Types: Cigarettes   Smokeless Tobacco Current User   Tobacco Comment     pack a month      Family History:         Family History   Problem Relation Age of Onset    No Known Problems Mother      Colon cancer Father 61    No Known Problems Sister      No Known Problems Brother      No Known Problems Maternal Aunt      No Known Problems Paternal Aunt      No Known Problems Maternal Uncle      No Known Problems Paternal Uncle      No Known Problems Maternal Grandfather      No Known Problems Maternal Grandmother      No Known Problems Paternal Grandfather      Hypertension Paternal Grandmother      No Known Problems Cousin      Prostate cancer Family      Stroke Family      Diabetes Family           MELLITUS    Coronary artery disease Family           Meds/Allergies   all current active meds have been reviewed, current meds:   Current Medications   No current facility-administered medications for this encounter  and PTA meds:   Prior to Admission Medications   Prescriptions Last Dose Informant Patient Reported? Taking?    ACCU-CHEK FASTCLIX LANCETS MISC     No No   Sig: by Does not apply route daily   ACCU-CHEK GUIDE test strip     Yes No   Blood Glucose Monitoring Suppl (ACCU-CHEK GUIDE) w/Device KIT     No No   Sig: by Does not apply route daily   albuterol (ProAir HFA) 90 mcg/act inhaler     No No   Sig: Inhale 2 puffs every 4 (four) hours as needed for wheezing or shortness of breath   atorvastatin (LIPITOR) 10 mg tablet     No No   Sig: Take 1 tablet (10 mg total) by mouth daily   emtricitabine-tenofovir (Truvada) 200-300 mg per tablet     No No   Sig: Take 1 tablet by mouth every 24 hours   isoniazid (NYDRAZID) 100 mg tablet   Self Yes No   Sig: Take 100 mg by mouth daily   metFORMIN (GLUCOPHAGE) 1000 MG tablet     No No   Sig: Take 1 tablet (1,000 mg total) by mouth 2 (two) times a day with meals   pyridoxine (VITAMIN B6) 100 mg tablet   Self Yes No   Sig: Take 100 mg by mouth daily   rivaroxaban (Xarelto) 20 mg tablet     No No   Sig: Take 1 tablet (20 mg total) by mouth every 24 hours      Facility-Administered Medications: None            Allergies   Allergen Reactions    Fish-Derived Products - Food Allergy         Cod    Nuts - Food Allergy         Walnuts       Pollen Extract      Warfarin         Panic attacks    Watermelon [Citrullus Vulgaris]           Objective   First Vitals:   Blood Pressure: 96/64 (01/10/22 1110)  Pulse: 95 (01/10/22 1110)  Temperature: 97 6 °F (36 4 °C) (01/10/22 1110)  Temp Source: Oral (01/10/22 1110)  Respirations: 18 (01/10/22 1110)  Height: 5' 10" (177 8 cm) (01/10/22 1110)  Weight - Scale: 84 7 kg (186 lb 11 7 oz) (01/10/22 1110)  SpO2: 94 % (01/10/22 1110)     Current Vitals:   Blood Pressure: 108/66 (01/10/22 1338)  Pulse: 55 (01/10/22 1338)  Temperature: 98 1 °F (36 7 °C) (01/10/22 1338)  Temp Source: Oral (01/10/22 1338)  Respirations: 16 (01/10/22 1338)  Height: 5' 10" (177 8 cm) (01/10/22 1110)  Weight - Scale: 84 7 kg (186 lb 11 7 oz) (01/10/22 1110)  SpO2: 100 % (01/10/22 1338)        Intake/Output Summary (Last 24 hours) at 1/10/2022 1506  Last data filed at 1/10/2022 1436      Gross per 24 hour   Intake 200 ml   Output --   Net 200 ml              Invasive Devices  Report           Peripheral Intravenous Line                     Peripheral IV 01/10/22 Right Antecubital <1 day                     Physical Exam  Vitals reviewed  Constitutional:       Appearance: Normal appearance  HENT:      Head: Normocephalic  Nose: Nose normal       Mouth/Throat:      Mouth: Mucous membranes are moist    Eyes:      Pupils: Pupils are equal, round, and reactive to light  Cardiovascular:      Rate and Rhythm: Normal rate and regular rhythm  Pulses: Normal pulses  Heart sounds: Normal heart sounds  Pulmonary:      Effort: Pulmonary effort is normal       Breath sounds: Normal breath sounds  Abdominal:      General: There is no distension  Palpations: Abdomen is soft  There is no mass  Tenderness: There is abdominal tenderness (Right lower quadrant)  There is guarding  There is no rebound  Hernia: No hernia is present  Genitourinary:     Penis: Normal        Testes: Normal    Musculoskeletal:      Cervical back: Normal range of motion  Skin:     General: Skin is warm  Capillary Refill: Capillary refill takes less than 2 seconds  Neurological:      General: No focal deficit present  Mental Status: He is alert and oriented to person, place, and time     Psychiatric:         Mood and Affect: Mood normal          Behavior: Behavior normal             Lab Results:   I have personally reviewed pertinent lab results  , CBC:         Lab Results   Component Value Date     WBC 7 74 01/10/2022     HGB 13 1 01/10/2022     HCT 39 3 01/10/2022     MCV 84 01/10/2022      01/10/2022     MCH 27 9 01/10/2022     MCHC 33 3 01/10/2022     RDW 12 9 01/10/2022     MPV 9 4 01/10/2022     NRBC 0 01/10/2022   , CMP:         Lab Results   Component Value Date     SODIUM 136 01/10/2022     K 3 7 01/10/2022      01/10/2022     CO2 29 01/10/2022     BUN 11 01/10/2022     CREATININE 1 14 01/10/2022     CALCIUM 8 9 01/10/2022     AST 18 01/10/2022     ALT 31 01/10/2022     ALKPHOS 74 2 01/10/2022     EGFR 73 01/10/2022   , Coagulation: No results found for: PT, INR, APTT, Urinalysis:         Lab Results   Component Value Date     COLORU Yellow 01/10/2022     CLARITYU Clear 01/10/2022     SPECGRAV 1 015 01/10/2022     PHUR 6 0 01/10/2022     LEUKOCYTESUR Negative 01/10/2022     NITRITE Negative 01/10/2022     GLUCOSEU 3+ (A) 01/10/2022     KETONESU Negative 01/10/2022     BILIRUBINUR Negative 01/10/2022     BLOODU 1+ (A) 01/10/2022   , Amylase: No results found for: AMYLASE, Lipase: No results found for: LIPASE  Imaging: I have personally reviewed pertinent reports  and I have personally reviewed pertinent films in PACS  EKG, Pathology, and Other Studies: I have personally reviewed pertinent reports  and I have personally reviewed pertinent films in PACS     Counseling / Coordination of Care  Total floor / unit time spent today 30 minutes  Greater than 50% of total time was spent with the patient and / or family counseling and / or coordination of care  A description of the counseling / coordination of care: 10

## 2022-01-11 NOTE — OP NOTE
General SurgeryAPPENDECTOMY LAPAROSCOPIC Op Note    Jannet Peterson  1/11/2022    Pre-op Diagnosis:   Acute appendicitis with perforation and localized peritonitis, unspecified whether abscess present, unspecified whether gangrene present [K35 32]  Patient Active Problem List   Diagnosis    Schizoaffective disorder, bipolar type ([facility] )    Generalized anxiety disorder    Cannabis abuse, episodic    Dyslexia, developmental    Benign prostatic hyperplasia with urinary frequency    Type 2 diabetes mellitus without complication, without long-term current use of insulin (HCC)    Epilepsy ([facility] )    Peptic ulcer disease    Positive RPR test    Near syncope    Deep venous thrombosis (HCC)    HIV exposure    Mild intermittent asthma without complication    Protein S deficiency ([facility]    Encounter for wellness examination    Tobacco dependence    Testicle lump    Other male erectile dysfunction    Spermatocele of epididymis    Post-traumatic bulbous urethral stricture    Positive QuantiFERON-TB Gold test    High risk sexual behavior    Syphilis    Gross hematuria    Acute perforated appendicitis    Current use of long term anticoagulation       Post-op Diagnosis:  Post-Op Diagnosis Codes:     * Acute appendicitis with perforation and localized peritonitis, unspecified whether abscess present, unspecified whether gangrene present [K35 32]  Patient Active Problem List   Diagnosis    Schizoaffective disorder, bipolar type ([facility] )    Generalized anxiety disorder    Cannabis abuse, episodic    Dyslexia, developmental    Benign prostatic hyperplasia with urinary frequency    Type 2 diabetes mellitus without complication, without long-term current use of insulin ([facility] )    Epilepsy ([facility] )    Peptic ulcer disease    Positive RPR test    Near syncope    Deep venous thrombosis ([facility] )    HIV exposure    Mild intermittent asthma without complication    Protein S deficiency ([facility] )    Encounter for wellness examination    Tobacco dependence    Testicle lump    Other male erectile dysfunction    Spermatocele of epididymis    Post-traumatic bulbous urethral stricture    Positive QuantiFERON-TB Gold test    High risk sexual behavior    Syphilis    Gross hematuria    Acute perforated appendicitis    Current use of long term anticoagulation       PMH:  Past Medical History:   Diagnosis Date    Anemia     Anxiety     Arthritis     Asthma     Blood clot in vein 2017    right leg    Blood disorder     Chronic pain disorder     good control    Depression     Diabetes mellitus [facility] )     type 2    Enlarged prostate     [facility] )     past, last was 1 year ago    Hypertension     Psychiatric disorder     bipolar    Psychiatric illness     Psychosis ([facility] )     Sleep apnea     snores but hasn't been tested    Vertigo      Procedure(s):  APPENDECTOMY LAPAROSCOPIC    Surgeon(s):  MD Jan Krause MD    [facility]  Past Medical History:   Diagnosis Date    Anemia     Anxiety     Arthritis     Asthma     Blood clot in vein 2017    right leg    Blood disorder     Chronic pain disorder     good control    Depression     Diabetes mellitus ([facility] )     type 2    Enlarged prostate     Epilepsy ([facility] )     past, last was 1 year ago    Hypertension     Psychiatric disorder     bipolar    Psychiatric illness     Psychosis ([facility] )     Sleep apnea     snores but hasn't been tested    Vertigo      Anesthesia:  General    Assistant:  Jan Godfrey MD    Staff:   Circulator: Sy Sanchez RN  Scrub Person: Manan Edwards RN    Operative Findings:  Retrocecal appendix  Localized peritonitis with perforation        Procedure:  Minerva Roy was identified by me  Proper consent was obtained  The risks, benefits, alternatives,and probabilities of success were discussed in detail with no guarantee made as to outcome  All questions were answered to the patient's satisfaction  Site was marked  Prior coming to [address] [place] [address] was cleansed with ChloraPrep and draped in usual sterile fashion  Gary Riley was given pre-operative antibiotics  Body mass index is 26 48 kg/m²  Gary Riley is ASA 3E and Fire risk- 3  Gary Riley was re-identified in the OR  Site was identified and detailed timeout was performed with Anesthesia and OR staff  Gary Riley was given IV Zosyn as prophylactic antibiotics  Venodynes were placed, and Modi catheter was placed  After painting, draping, and time-out, infraumbilical transverse curvilinear incision was made  The skin and subcutaneous tissue was cut along the line of incision with open Amandeep technique  A 10 mm port was placed  Pneumoperitoneum was created with the pressure marked up to 15 mmHg and maintained during the procedure with high flow carbon dioxideOther two ports were placed, one into the left lower quadrant in midclavicular line and one suprapubic under direct telescopic vision  The appendix was curved and there were multiple adhesions were markedly inflamed appendix was enlarged and completely covered with cecum  these adhesions which were carefully dissected with the harmonic scalpel, mesoappendix was  cauterized and cut with the Harmonic scalpel and the base of the appendix was markedly dilated  and was very inflammed , the right colon and cecum was slightly mobilized and appendix was stapled and cut with a cuff of cecum with ECHELON FLEX 60 Powered plus Stapler The appendix was then placed into Endo pouch and was delivered through the umbilical incision    The appendix a break at the mid the body near the so to endo-pouch were used to place the appendix and the fecal matter   Abdominal cavity was thoroughly lavaged with copious amounts of fluid for irrigation mixed with antibiotics particularly pelvis and right paracolic gutter and subhepatic space and subdiaphragmatic space were lavaged Base of appendix sites was re inspected staple line was intact and no active bleeding was noted  A BEL Drain was placed in Rt Paracolic gutter and pelvis through Supra pubic 5 mm port site  Drain was secured with 3-0 Nylon sutureThe 10 mm port site was closed in two layers with the figure-of-eight Vicryl, and the skin was approximated with subcuticular Monocryl 4-0  All the 5 mm ports were closed in single layer subcuticular Monocryl  All the ports were thoroughly infiltrated with Marcaine with epinephrine  Elsieteresita Farrar tolerated the procedure well, remain stable during and after the procedure  At the end of the procedure all the instruments, needle and sponge counts were noted to be correct  Sterile dresing was applied and patient was transfered to recovery area in stable condition  I was present for the entire procedure     4th year surgical resident was present and his services was required due to the intensity of the surgery  He is assistant was required for camera operation, hemostasis retraction and the closure of the surgical wound  Surgical resident was present during the entire procedure  Patient Disposition:  PACU     Estimated Blood Loss:  Minimal    Specimens:  Order Name Source Comment Collection Info Order Time   TISSUE EXAM Appendix  Collected By: Santa Laurent MD 1/11/2022 11:29 AM     Release to patient through Mychart   Immediate              Drains:  Closed/Suction Drain Anterior; Inferior;Midline Abdomen Bulb 15 Fr  (Active)       Urethral Catheter Latex 16 Fr  (Active)       Complications:  None    Total OR Time  * Missing case tracking time(s) *   or      Santa Laurent MD MS [facility]  Date: 1/11/2022  Time: 11:54 AM  Copy to PCP: No primary care provider on file

## 2022-01-11 NOTE — ANESTHESIA POSTPROCEDURE EVALUATION
Post-Op Assessment Note    CV Status:  Stable  Pain Score: 0    Pain management: adequate     Mental Status:  Sleepy   Hydration Status:  Euvolemic   PONV Controlled:  Controlled   Airway Patency:  Patent      Post Op Vitals Reviewed: Yes      Staff: CRNA         No complications documented      BP   138/76   Temp      Pulse 111   Resp 14   SpO2 100%

## 2022-01-11 NOTE — PLAN OF CARE
Problem: GASTROINTESTINAL - ADULT  Goal: Minimal or absence of nausea and/or vomiting  Description: INTERVENTIONS:  - Administer IV fluids if ordered to ensure adequate hydration  - Maintain NPO status until nausea and vomiting are resolved  - Nasogastric tube if ordered  - Administer ordered antiemetic medications as needed  - Provide nonpharmacologic comfort measures as appropriate  - Advance diet as tolerated, if ordered  - Consider nutrition services referral to assist patient with adequate nutrition and appropriate food choices  Outcome: Progressing  Goal: Maintains or returns to baseline bowel function  Description: INTERVENTIONS:  - Assess bowel function  - Encourage oral fluids to ensure adequate hydration  - Administer IV fluids if ordered to ensure adequate hydration  - Administer ordered medications as needed  - Encourage mobilization and activity  - Consider nutritional services referral to assist patient with adequate nutrition and appropriate food choices  Outcome: Progressing     Problem: PAIN - ADULT  Goal: Verbalizes/displays adequate comfort level or baseline comfort level  Description: Interventions:  - Encourage patient to monitor pain and request assistance  - Assess pain using appropriate pain scale  - Administer analgesics based on type and severity of pain and evaluate response  - Implement non-pharmacological measures as appropriate and evaluate response  - Consider cultural and social influences on pain and pain management  - Notify physician/advanced practitioner if interventions unsuccessful or patient reports new pain  Outcome: Progressing     Problem: DISCHARGE PLANNING  Goal: Discharge to home or other facility with appropriate resources  Description: INTERVENTIONS:  - Identify barriers to discharge w/patient and caregiver  - Arrange for needed discharge resources and transportation as appropriate  - Identify discharge learning needs (meds, wound care, etc )  - Arrange for interpretive services to assist at discharge as needed  - Refer to Case Management Department for coordinating discharge planning if the patient needs post-hospital services based on physician/advanced practitioner order or complex needs related to functional status, cognitive ability, or social support system  Outcome: Progressing     Problem: Knowledge Deficit  Goal: Patient/family/caregiver demonstrates understanding of disease process, treatment plan, medications, and discharge instructions  Description: Complete learning assessment and assess knowledge base    Interventions:  - Provide teaching at level of understanding  - Provide teaching via preferred learning methods  Outcome: Progressing

## 2022-01-12 VITALS
OXYGEN SATURATION: 98 % | HEART RATE: 101 BPM | DIASTOLIC BLOOD PRESSURE: 81 MMHG | SYSTOLIC BLOOD PRESSURE: 128 MMHG | HEIGHT: 70 IN | BODY MASS INDEX: 26.42 KG/M2 | TEMPERATURE: 98.7 F | WEIGHT: 184.53 LBS | RESPIRATION RATE: 18 BRPM

## 2022-01-12 PROCEDURE — 99024 POSTOP FOLLOW-UP VISIT: CPT | Performed by: SPECIALIST

## 2022-01-12 PROCEDURE — NC001 PR NO CHARGE: Performed by: SPECIALIST

## 2022-01-12 RX ORDER — OXYCODONE HYDROCHLORIDE 5 MG/1
5 TABLET ORAL EVERY 4 HOURS PRN
Qty: 7 TABLET | Refills: 0 | Status: ON HOLD | OUTPATIENT
Start: 2022-01-12 | End: 2022-01-25

## 2022-01-12 RX ORDER — ALBUTEROL SULFATE 90 UG/1
2 AEROSOL, METERED RESPIRATORY (INHALATION) EVERY 4 HOURS PRN
Status: DISCONTINUED | OUTPATIENT
Start: 2022-01-12 | End: 2022-01-12 | Stop reason: HOSPADM

## 2022-01-12 RX ORDER — DOCUSATE SODIUM 100 MG/1
100 CAPSULE, LIQUID FILLED ORAL 2 TIMES DAILY
Status: DISCONTINUED | OUTPATIENT
Start: 2022-01-12 | End: 2022-01-12 | Stop reason: HOSPADM

## 2022-01-12 RX ORDER — OXYCODONE HYDROCHLORIDE 5 MG/1
5 TABLET ORAL EVERY 4 HOURS PRN
Status: DISCONTINUED | OUTPATIENT
Start: 2022-01-12 | End: 2022-01-12 | Stop reason: HOSPADM

## 2022-01-12 RX ORDER — OXYCODONE HYDROCHLORIDE 5 MG/1
7.5 TABLET ORAL EVERY 4 HOURS PRN
Status: DISCONTINUED | OUTPATIENT
Start: 2022-01-12 | End: 2022-01-12 | Stop reason: HOSPADM

## 2022-01-12 RX ORDER — ACETAMINOPHEN 325 MG/1
650 TABLET ORAL EVERY 6 HOURS SCHEDULED
Status: DISCONTINUED | OUTPATIENT
Start: 2022-01-12 | End: 2022-01-12 | Stop reason: HOSPADM

## 2022-01-12 RX ORDER — CALCIUM CARBONATE 200(500)MG
500 TABLET,CHEWABLE ORAL DAILY PRN
Status: DISCONTINUED | OUTPATIENT
Start: 2022-01-12 | End: 2022-01-12 | Stop reason: HOSPADM

## 2022-01-12 RX ORDER — AMOXICILLIN AND CLAVULANATE POTASSIUM 875; 125 MG/1; MG/1
1 TABLET, FILM COATED ORAL EVERY 12 HOURS SCHEDULED
Qty: 8 TABLET | Refills: 0 | Status: SHIPPED | OUTPATIENT
Start: 2022-01-12 | End: 2022-01-16

## 2022-01-12 RX ORDER — ISONIAZID 100 MG/1
100 TABLET ORAL DAILY
Status: DISCONTINUED | OUTPATIENT
Start: 2022-01-12 | End: 2022-01-12 | Stop reason: HOSPADM

## 2022-01-12 RX ORDER — HYDROMORPHONE HCL/PF 1 MG/ML
0.2 SYRINGE (ML) INJECTION
Status: DISCONTINUED | OUTPATIENT
Start: 2022-01-12 | End: 2022-01-12 | Stop reason: HOSPADM

## 2022-01-12 RX ADMIN — RIVAROXABAN 20 MG: 20 TABLET, FILM COATED ORAL at 09:34

## 2022-01-12 RX ADMIN — SODIUM CHLORIDE, SODIUM LACTATE, POTASSIUM CHLORIDE, AND CALCIUM CHLORIDE 100 ML/HR: .6; .31; .03; .02 INJECTION, SOLUTION INTRAVENOUS at 06:41

## 2022-01-12 RX ADMIN — HEPARIN SODIUM 5000 UNITS: 5000 INJECTION INTRAVENOUS; SUBCUTANEOUS at 06:40

## 2022-01-12 RX ADMIN — PIPERACILLIN AND TAZOBACTAM 3.38 G: 36; 4.5 INJECTION, POWDER, FOR SOLUTION INTRAVENOUS at 08:06

## 2022-01-12 RX ADMIN — HYDROMORPHONE HYDROCHLORIDE 1 MG: 1 INJECTION, SOLUTION INTRAMUSCULAR; INTRAVENOUS; SUBCUTANEOUS at 06:40

## 2022-01-12 RX ADMIN — PIPERACILLIN AND TAZOBACTAM 3.38 G: 36; 4.5 INJECTION, POWDER, FOR SOLUTION INTRAVENOUS at 13:43

## 2022-01-12 RX ADMIN — OXYCODONE HYDROCHLORIDE 5 MG: 5 TABLET ORAL at 10:41

## 2022-01-12 RX ADMIN — DOCUSATE SODIUM 100 MG: 100 CAPSULE ORAL at 09:33

## 2022-01-12 RX ADMIN — NICOTINE 21 MG: 21 PATCH, EXTENDED RELEASE TRANSDERMAL at 08:06

## 2022-01-12 RX ADMIN — ISONIAZID 100 MG: 100 TABLET ORAL at 10:42

## 2022-01-12 RX ADMIN — FOLIC ACID 1 MG: 1 TABLET ORAL at 08:06

## 2022-01-12 RX ADMIN — ACETAMINOPHEN 650 MG: 325 TABLET, FILM COATED ORAL at 09:34

## 2022-01-12 RX ADMIN — Medication 1 TABLET: at 08:06

## 2022-01-12 RX ADMIN — PIPERACILLIN AND TAZOBACTAM 3.38 G: 36; 4.5 INJECTION, POWDER, FOR SOLUTION INTRAVENOUS at 02:55

## 2022-01-12 RX ADMIN — THIAMINE HCL TAB 100 MG 100 MG: 100 TAB at 08:06

## 2022-01-12 NOTE — PROGRESS NOTES
Progress Note - General Surgery   Debbi Grandchild 46 y o  male MRN: [id number]  Unit/Bed#: [address] [place] [address] Encounter: [id number]    Assessment:  47 yo male with history of protein S deficiency and psychiatric disorder whom presented with abdominal pain secondary to perforated appendicitis  Patient is now POD#1 s/p laparoscopic appendectomy       Plan:  · BEL drain removed  · Restart Xarelto  · Regular diet   · D/c IV fluids   · Finish 24 hrs postop zosyn, send home on 4 days PO augmentin  · Postoperative care instructions reviewed with patient  · discharge today      Subjective/Objective     Subjective:  Patient tolerating oral intake without any nausea, vomiting, severe abdominal pain  He has been OOB and ambulating  Objective:     Blood pressure 128/81, pulse 101, temperature 98 7 °F (37 1 °C), resp  rate 18, height 5' 10" (1 778 m), weight 83 7 kg (184 lb 8 4 oz), SpO2 98 %  ,Body mass index is 26 48 kg/m²  Intake/Output Summary (Last 24 hours) at 1/12/2022 1048  Last data filed at 1/12/2022 9658  Gross per 24 hour   Intake 3250 ml   Output 2025 ml   Net 1225 ml       Invasive Devices  Report    Peripheral Intravenous Line            Peripheral IV 01/10/22 Right Antecubital 1 day                Physical Exam: /81   Pulse 101   Temp 98 7 °F (37 1 °C)   Resp 18   Ht 5' 10" (1 778 m)   Wt 83 7 kg (184 lb 8 4 oz)   SpO2 98%   BMI 26 48 kg/m²   General appearance: alert and oriented, in no acute distress  Head: Normocephalic, without obvious abnormality, atraumatic  Lungs: clear to auscultation bilaterally  Heart: regular rate and rhythm  Abdomen: soft, non distended, no guarding, incisions are CDI,   Extremities: extremities normal, warm and well-perfused; no cyanosis, clubbing, or edema  Skin: Skin color, texture, turgor normal  No rashes or lesions    Lab, Imaging and other studies:I have personally reviewed pertinent lab results    , CBC: No results found for: WBC, HGB, HCT, MCV, PLT, ADJUSTEDWBC, MCH, MCHC, RDW, MPV, NRBC, CMP: No results found for: SODIUM, K, CL, CO2, ANIONGAP, BUN, CREATININE, GLUCOSE, CALCIUM, AST, ALT, ALKPHOS, PROT, BILITOT, EGFR, Coagulation: No results found for: PT, INR, APTT  VTE Pharmacologic Prophylaxis: Heparin SQ transition to xarelto  VTE Mechanical Prophylaxis: sequential compression device

## 2022-01-12 NOTE — CASE MANAGEMENT
Case Management Discharge Planning Note    Patient name Garladn Christianson  Location [address] [place] [address] 68 215 MRN [id number]  : 1970 Date 2022       Current Admission Date: 1/10/2022  Current Admission Diagnosis:Acute perforated appendicitis   Patient Active Problem List    Diagnosis Date Noted    Acute perforated appendicitis 2022    Current use of long term anticoagulation 2022    Syphilis 2021    Gross hematuria 2021    High risk sexual behavior 10/14/2020    Positive QuantiFERON-TB Gold test 2020    Post-traumatic bulbous urethral stricture 2019    Spermatocele of epididymis 2019    Encounter for wellness examination 2019    Tobacco dependence 2019    Testicle lump 2019    Other male erectile dysfunction 2019    Near syncope 2018    [facility] 2018    HIV exposure 2018    Mild intermittent asthma without complication     Protein S deficiency ([facility] 2018    Positive RPR test 2016    Peptic ulcer disease 2016    Sch[facility]oaffective disorder, bipolar type [facility] 2016    Generalized anxiety disorder 2016    Cannabis abuse, episodic 2016    Dyslexia, developmental 2016    Benign prostatic hyperplasia with urinary frequency 2016    Type 2 diabetes mellitus without complication, without long-term current use of insulin ([facility] ) 2016    Epilepsy ([facility] ) 2016      LOS (days): 2  Geometric Mean LOS (GMLOS) (days): 3 80  Days to GMLOS:2 1     OBJECTIVE:  Risk of Unplanned Readmission Score: 15     Current admission status: Inpatient   Preferred Pharmacy:   [address] [place] [address],[address] [address]  Phone: [phone] Fax: [phone]    [facility] [address] [facility], [address] [facility] [address]  Phone: [phone] Fax: [address] [facility] [address], [facility] [address]  Phone: [phone] Fax: [phone]    Primary Care Provider: No primary care provider on file  Primary Insurance: [address] [facility] [facility] REP  Secondary Insurance: [facility]    DISCHARGE DETAILS:    Discharge planning discussed with[de-identified] ALANNA Rosas    Per rounds with ALANNA Rosas pt is reported to be independent with no apparent discharge needs at this time          Other Referral/Resources/Interventions Provided:  Interventions: None Indicated    Treatment Team Recommendation: Home  Discharge Destination Plan[de-identified] Home    IMM Given (Date):: 01/12/22 (initial)

## 2022-01-12 NOTE — DISCHARGE SUMMARY
Discharge Summary - Debbi Mejia 46 y o  male MRN: [id number]    Unit/Bed#: [address] [place] [address] Encounter: [id number]    Admission Date: 1/10/2022   Discharge Date: 1/12/2022    Admitting Diagnosis:   Acute appendicitis [K35 80]    Discharge Diagnoses: Principal Problem:    Acute perforated appendicitis  Active Problems:    Schizoaffective disorder, bipolar type ([address] )    Generalized anxiety disorder    Cannabis abuse, episodic    Benign prostatic hyperplasia with urinary frequency    Type 2 diabetes mellitus without complication, without long-term current use of insulin (HCC)    Protein S deficiency (HCC)    Positive QuantiFERON-TB Gold test    Current use of long term anticoagulation      Consultations:  n/a    Procedures Performed:  Laparoscopic appendectomy    HPI per admission H&P:  Dwight Nicholas a 46 y  o  male who presents to the emergency department with complaints of right lower quadrant pain   He says that he woke up with pain this morning   No complaints of nausea vomiting   No fever   Patient has protein S deficiency for which she takes Lindsay Memory also takes metformin for diabetes   His past surgical history is significant for right inguinal hernia repair  [facility] also had laparoscopic excision of a cyst from around his umbilicus  Hospital Course: Debbi Mejia is a 46 y o  male admitted for perforated appendicitis  Patient's xarelto was held in preparation for surgery  Laparoscopic appendectomy was performed without any intraoperative complications and patient tolerated procedure well  BEL drain was placed intraoperatively after findings of perforated appendicitis with fecalith  Patient received 24 hrs of postoperative Zosyn and then was transitioned to augmentin for total 5 days  Patient's Xarelto was restarted POD#1  BEL drain output remained serosanguinous and was removed prior to discharge  Patient was discharged after postoperative care instructions were discussed with him in detail  Condition at Discharge: good     Discharge instructions/Information to patient and family:   See after visit summary for information provided to patient and family  Provisions for Follow-Up Care:  See after visit summary for information related to follow-up care and any pertinent home health orders  Disposition: Home    Planned Readmission: No    Discharge Statement   I spent 25 minutes discharging the patient  This time was spent on the day of discharge  I had direct contact with the patient on the day of discharge  Additional documentation is required if more than 30 minutes were spent on discharge  Discharge Medications:  See after visit summary for reconciled discharge medications provided to patient and family

## 2022-01-12 NOTE — DISCHARGE INSTRUCTIONS
Postoperative Care Instructions      1  General: You may feel pulling sensations around the wound or funny aches and pains around the incisions  This is normal  Even minor surgery is a change in your body and this is your body's reaction to it  If you have had abdominal surgery, it may help to support the incision with a small pillow or blanket for comfort when moving or coughing  2  Wound care: The glue over the incisions will fall off over the next week or two      3  Showering: You may shower  Please do not soak wound in standing water such as a bath, hot tub, pool, lake, etc  Do not scrub or use exfoliants on the surgical wounds  4  Activity: You may go up and down stairs, walk as much as you are comfortable, but walk at least 3 times each day  If you have had abdominal surgery, do not perform any strenuous exercise or lift anything heavier than 15 pounds for at least 4 weeks, unless cleared by your physician  5  Diet: You may resume your regular diet  Please drink lots of water  6  Medications: Resume all of your previous medications, unless told otherwise by the doctor  A good option for pain control is to start with acetaminophen(Tylenol) 325mg every 6 hours  If this is not sufficient then you make take the narcotic pain medicine as prescribed  You do not need to take the narcotic pain medication unless you are having significant pain and discomfort  Please take the narcotic medication with food  Insure that you do not take more than 4000 mg of Tylenol per day  Please finish the Augmentin as prescribed  7  Driving: You will need someone to drive you home on the day of surgery  Do not drive or make any important decisions while on narcotic pain medication  Generally, you may drive 48 hours after you've stopped taking all narcotic pain medications  8  Upset Stomach: You may take Maalox, Tums, or similar items for an upset stomach   If your narcotic pain medication causes an upset stomach, do not take it on an empty stomach  Try taking it with at least some crackers or toast      9  Constipation: Patients often experience constipation after surgery  We recommend starting an over-the-counter medication for this, such as Metamucil, Senokot, Colace, milk of magnesia, etc  You may stop taking these medications a couple days after your last dose of narcotic medication  If you experience significant nausea or vomiting after abdominal surgery, call the office before trying any of these medications  10  Call the office: If you are experiencing any of the following: fevers above 101 5°, significant nausea or vomiting, if the wound develops drainage and/or excessive redness around the wound, or if you have significant diarrhea or other worsening symptoms      11  Pain: A prescription for narcotic pain medication will be sent to your pharmacy upon discharge from the hospital

## 2022-01-13 NOTE — UTILIZATION REVIEW
Notification of Discharge   This is a Notification of Discharge from our facility [address]  Please be advised that this patient has been discharge from our facility  Below you will find the admission and discharge date and time including the patients disposition  UTILIZATION REVIEW CONTACT:  Jacob Neil  Utilization   Network Utilization Review Department  Phone: [phone] x carefully listen to the prompts  All voicemails are confidential   Email: [email or web address]     PHYSICIAN ADVISORY SERVICES:  FOR [id number] REVIEW - MEDICAL NECESSITY DENIAL  Phone: [phone]  Fax: [phone]  Email: [email or web address]     PRESENTATION DATE: 1/10/2022  7:58 PM  OBERVATION ADMISSION DATE:   INPATIENT ADMISSION DATE: 1/10/22  7:58 PM   DISCHARGE DATE: 1/12/2022  4:28 PM  DISPOSITION: Home/Self Care Home/Self Care      IMPORTANT INFORMATION:  Send all requests for admission clinical reviews, approved or denied determinations and any other requests to dedicated fax number below belonging to the campus where the patient is receiving treatment   List of dedicated fax numbers:  [address] DENIALS (Administrative/Medical Necessity) [phone]   [address] (Maternity/NICU/Pediatrics) [phone]   Ildefonso Kim [phone]   Aria Fernandez [phone]   [facility] [phone]   [facility] [address] [facility] [phone]   [facility]  [phone] [address] [facility] [address]  [address] [facility] 1000 [address] [facility] [phone]

## 2022-01-18 ENCOUNTER — HOSPITAL ENCOUNTER (INPATIENT)
Facility: HOSPITAL | Age: 52
LOS: 6 days | Discharge: HOME HEALTH | DRG: 871 | End: 2022-01-25
Attending: SPECIALIST | Admitting: SPECIALIST
Payer: COMMERCIAL

## 2022-01-18 DIAGNOSIS — E11.9 TYPE 2 DIABETES MELLITUS WITHOUT COMPLICATION, WITHOUT LONG-TERM CURRENT USE OF INSULIN (HCC): ICD-10-CM

## 2022-01-18 DIAGNOSIS — T81.43XA INTRA-ABDOMINAL ABSCESS POST-PROCEDURE: Primary | ICD-10-CM

## 2022-01-18 DIAGNOSIS — R26.2 AMBULATORY DYSFUNCTION: ICD-10-CM

## 2022-01-18 DIAGNOSIS — K75.0 LIVER ABSCESS: ICD-10-CM

## 2022-01-18 DIAGNOSIS — R07.9 CHEST PAIN, UNSPECIFIED TYPE: ICD-10-CM

## 2022-01-18 DIAGNOSIS — Z90.49 S/P APPENDECTOMY: ICD-10-CM

## 2022-01-19 ENCOUNTER — APPOINTMENT (INPATIENT)
Dept: NON INVASIVE DIAGNOSTICS | Facility: HOSPITAL | Age: 52
DRG: 871 | End: 2022-01-19
Payer: COMMERCIAL

## 2022-01-19 ENCOUNTER — APPOINTMENT (INPATIENT)
Dept: RADIOLOGY | Facility: HOSPITAL | Age: 52
DRG: 871 | End: 2022-01-19
Payer: COMMERCIAL

## 2022-01-19 ENCOUNTER — APPOINTMENT (INPATIENT)
Dept: RADIOLOGY | Facility: HOSPITAL | Age: 52
DRG: 871 | End: 2022-01-19
Attending: RADIOLOGY
Payer: COMMERCIAL

## 2022-01-19 PROBLEM — R07.9 CHEST PAIN: Status: ACTIVE | Noted: 2022-01-19

## 2022-01-19 PROBLEM — K75.0 LIVER ABSCESS: Status: ACTIVE | Noted: 2022-01-19

## 2022-01-19 PROBLEM — A41.9 SEPSIS (HCC): Status: ACTIVE | Noted: 2022-01-19

## 2022-01-19 PROBLEM — T81.43XA INTRA-ABDOMINAL ABSCESS POST-PROCEDURE: Status: ACTIVE | Noted: 2022-01-19

## 2022-01-19 PROBLEM — E78.5 HYPERLIPIDEMIA: Status: ACTIVE | Noted: 2022-01-19

## 2022-01-19 PROBLEM — K59.09 OTHER CONSTIPATION: Status: ACTIVE | Noted: 2022-01-19

## 2022-01-19 LAB
ANION GAP SERPL CALCULATED.3IONS-SCNC: 11 MMOL/L (ref 4–13)
BASOPHILS # BLD AUTO: 0.05 THOUSANDS/ΜL (ref 0–0.1)
BASOPHILS NFR BLD AUTO: 0 % (ref 0–1)
BUN SERPL-MCNC: 10 MG/DL (ref 5–25)
CALCIUM SERPL-MCNC: 8.9 MG/DL (ref 8.3–10.1)
CARDIAC TROPONIN I PNL SERPL HS: 3 NG/L (ref 8–18)
CARDIAC TROPONIN I PNL SERPL HS: 4 NG/L
CHLORIDE SERPL-SCNC: 98 MMOL/L (ref 100–108)
CO2 SERPL-SCNC: 29 MMOL/L (ref 21–32)
CREAT SERPL-MCNC: 1.11 MG/DL (ref 0.6–1.3)
EOSINOPHIL # BLD AUTO: 0.07 THOUSAND/ΜL (ref 0–0.61)
EOSINOPHIL NFR BLD AUTO: 1 % (ref 0–6)
ERYTHROCYTE [DISTWIDTH] IN BLOOD BY AUTOMATED COUNT: 13.5 % (ref 11.6–15.1)
GFR SERPL CREATININE-BSD FRML MDRD: 75 ML/MIN/1.73SQ M
GLUCOSE SERPL-MCNC: 159 MG/DL (ref 65–140)
GLUCOSE SERPL-MCNC: 174 MG/DL (ref 65–140)
GLUCOSE SERPL-MCNC: 175 MG/DL (ref 65–140)
GLUCOSE SERPL-MCNC: 191 MG/DL (ref 65–140)
HCT VFR BLD AUTO: 36.4 % (ref 36.5–49.3)
HGB BLD-MCNC: 11.9 G/DL (ref 12–17)
IMM GRANULOCYTES # BLD AUTO: 0.09 THOUSAND/UL (ref 0–0.2)
IMM GRANULOCYTES NFR BLD AUTO: 1 % (ref 0–2)
LYMPHOCYTES # BLD AUTO: 3.46 THOUSANDS/ΜL (ref 0.6–4.47)
LYMPHOCYTES NFR BLD AUTO: 29 % (ref 14–44)
MAGNESIUM SERPL-MCNC: 1.9 MG/DL (ref 1.6–2.6)
MCH RBC QN AUTO: 27.2 PG (ref 26.8–34.3)
MCHC RBC AUTO-ENTMCNC: 32.7 G/DL (ref 31.4–37.4)
MCV RBC AUTO: 83 FL (ref 82–98)
MONOCYTES # BLD AUTO: 1.18 THOUSAND/ΜL (ref 0.17–1.22)
MONOCYTES NFR BLD AUTO: 10 % (ref 4–12)
NEUTROPHILS # BLD AUTO: 7.25 THOUSANDS/ΜL (ref 1.85–7.62)
NEUTS SEG NFR BLD AUTO: 59 % (ref 43–75)
NRBC BLD AUTO-RTO: 0 /100 WBCS
PLATELET # BLD AUTO: 447 THOUSANDS/UL (ref 149–390)
PMV BLD AUTO: 8.8 FL (ref 8.9–12.7)
POTASSIUM SERPL-SCNC: 3.5 MMOL/L (ref 3.5–5.3)
RBC # BLD AUTO: 4.38 MILLION/UL (ref 3.88–5.62)
SODIUM SERPL-SCNC: 138 MMOL/L (ref 136–145)
WBC # BLD AUTO: 12.1 THOUSAND/UL (ref 4.31–10.16)

## 2022-01-19 PROCEDURE — 99152 MOD SED SAME PHYS/QHP 5/>YRS: CPT | Performed by: RADIOLOGY

## 2022-01-19 PROCEDURE — 87205 SMEAR GRAM STAIN: CPT | Performed by: SPECIALIST

## 2022-01-19 PROCEDURE — 87075 CULTR BACTERIA EXCEPT BLOOD: CPT | Performed by: SPECIALIST

## 2022-01-19 PROCEDURE — 93306 TTE W/DOPPLER COMPLETE: CPT

## 2022-01-19 PROCEDURE — 85025 COMPLETE CBC W/AUTO DIFF WBC: CPT | Performed by: PHYSICIAN ASSISTANT

## 2022-01-19 PROCEDURE — 94760 N-INVAS EAR/PLS OXIMETRY 1: CPT

## 2022-01-19 PROCEDURE — 84484 ASSAY OF TROPONIN QUANT: CPT | Performed by: PHYSICIAN ASSISTANT

## 2022-01-19 PROCEDURE — 93306 TTE W/DOPPLER COMPLETE: CPT | Performed by: INTERNAL MEDICINE

## 2022-01-19 PROCEDURE — 99222 1ST HOSP IP/OBS MODERATE 55: CPT | Performed by: INTERNAL MEDICINE

## 2022-01-19 PROCEDURE — 82948 REAGENT STRIP/BLOOD GLUCOSE: CPT

## 2022-01-19 PROCEDURE — 83735 ASSAY OF MAGNESIUM: CPT | Performed by: PHYSICIAN ASSISTANT

## 2022-01-19 PROCEDURE — 0W9G30Z DRAINAGE OF PERITONEAL CAVITY WITH DRAINAGE DEVICE, PERCUTANEOUS APPROACH: ICD-10-PCS | Performed by: RADIOLOGY

## 2022-01-19 PROCEDURE — 99253 IP/OBS CNSLTJ NEW/EST LOW 45: CPT | Performed by: NURSE PRACTITIONER

## 2022-01-19 PROCEDURE — 87185 SC STD ENZYME DETCJ PER NZM: CPT | Performed by: SPECIALIST

## 2022-01-19 PROCEDURE — C1729 CATH, DRAINAGE: HCPCS

## 2022-01-19 PROCEDURE — 80048 BASIC METABOLIC PNL TOTAL CA: CPT | Performed by: PHYSICIAN ASSISTANT

## 2022-01-19 PROCEDURE — 87077 CULTURE AEROBIC IDENTIFY: CPT | Performed by: SPECIALIST

## 2022-01-19 PROCEDURE — 49406 IMAGE CATH FLUID PERI/RETRO: CPT | Performed by: RADIOLOGY

## 2022-01-19 PROCEDURE — 0F9130Z DRAINAGE OF RIGHT LOBE LIVER WITH DRAINAGE DEVICE, PERCUTANEOUS APPROACH: ICD-10-PCS | Performed by: RADIOLOGY

## 2022-01-19 PROCEDURE — NC001 PR NO CHARGE: Performed by: RADIOLOGY

## 2022-01-19 PROCEDURE — 99024 POSTOP FOLLOW-UP VISIT: CPT | Performed by: SPECIALIST

## 2022-01-19 PROCEDURE — 87076 CULTURE ANAEROBE IDENT EACH: CPT | Performed by: SPECIALIST

## 2022-01-19 PROCEDURE — 87186 SC STD MICRODIL/AGAR DIL: CPT | Performed by: SPECIALIST

## 2022-01-19 PROCEDURE — 84484 ASSAY OF TROPONIN QUANT: CPT | Performed by: NURSE PRACTITIONER

## 2022-01-19 PROCEDURE — 71045 X-RAY EXAM CHEST 1 VIEW: CPT

## 2022-01-19 PROCEDURE — 94762 N-INVAS EAR/PLS OXIMTRY CONT: CPT

## 2022-01-19 PROCEDURE — 10030 IMG GID FLU COLL DRG SFT TIS: CPT

## 2022-01-19 PROCEDURE — 99152 MOD SED SAME PHYS/QHP 5/>YRS: CPT

## 2022-01-19 PROCEDURE — C1769 GUIDE WIRE: HCPCS

## 2022-01-19 PROCEDURE — 99153 MOD SED SAME PHYS/QHP EA: CPT

## 2022-01-19 PROCEDURE — 49405 IMAGE CATH FLUID COLXN VISC: CPT | Performed by: RADIOLOGY

## 2022-01-19 PROCEDURE — 87070 CULTURE OTHR SPECIMN AEROBIC: CPT | Performed by: SPECIALIST

## 2022-01-19 RX ORDER — BISACODYL 10 MG
10 SUPPOSITORY, RECTAL RECTAL ONCE
Status: COMPLETED | OUTPATIENT
Start: 2022-01-19 | End: 2022-01-19

## 2022-01-19 RX ORDER — VANCOMYCIN HYDROCHLORIDE 1 G/200ML
12.5 INJECTION, SOLUTION INTRAVENOUS EVERY 12 HOURS
Status: COMPLETED | OUTPATIENT
Start: 2022-01-19 | End: 2022-01-19

## 2022-01-19 RX ORDER — FENTANYL CITRATE 50 UG/ML
INJECTION, SOLUTION INTRAMUSCULAR; INTRAVENOUS CODE/TRAUMA/SEDATION MEDICATION
Status: COMPLETED | OUTPATIENT
Start: 2022-01-19 | End: 2022-01-19

## 2022-01-19 RX ORDER — PYRIDOXINE HCL (VITAMIN B6) 50 MG
100 TABLET ORAL DAILY
Status: DISCONTINUED | OUTPATIENT
Start: 2022-01-19 | End: 2022-01-25 | Stop reason: HOSPADM

## 2022-01-19 RX ORDER — OXYCODONE HYDROCHLORIDE AND ACETAMINOPHEN 5; 325 MG/1; MG/1
1 TABLET ORAL EVERY 4 HOURS PRN
Status: DISCONTINUED | OUTPATIENT
Start: 2022-01-19 | End: 2022-01-19

## 2022-01-19 RX ORDER — ONDANSETRON 2 MG/ML
4 INJECTION INTRAMUSCULAR; INTRAVENOUS EVERY 6 HOURS PRN
Status: DISCONTINUED | OUTPATIENT
Start: 2022-01-19 | End: 2022-01-25 | Stop reason: HOSPADM

## 2022-01-19 RX ORDER — MIDAZOLAM HYDROCHLORIDE 2 MG/2ML
INJECTION, SOLUTION INTRAMUSCULAR; INTRAVENOUS CODE/TRAUMA/SEDATION MEDICATION
Status: COMPLETED | OUTPATIENT
Start: 2022-01-19 | End: 2022-01-19

## 2022-01-19 RX ORDER — ATORVASTATIN CALCIUM 10 MG/1
10 TABLET, FILM COATED ORAL DAILY
Status: DISCONTINUED | OUTPATIENT
Start: 2022-01-19 | End: 2022-01-25 | Stop reason: HOSPADM

## 2022-01-19 RX ORDER — VANCOMYCIN HYDROCHLORIDE 1 G/200ML
12.5 INJECTION, SOLUTION INTRAVENOUS EVERY 12 HOURS
Status: DISCONTINUED | OUTPATIENT
Start: 2022-01-19 | End: 2022-01-19 | Stop reason: DRUGHIGH

## 2022-01-19 RX ORDER — SODIUM CHLORIDE, SODIUM LACTATE, POTASSIUM CHLORIDE, CALCIUM CHLORIDE 600; 310; 30; 20 MG/100ML; MG/100ML; MG/100ML; MG/100ML
75 INJECTION, SOLUTION INTRAVENOUS CONTINUOUS
Status: DISCONTINUED | OUTPATIENT
Start: 2022-01-19 | End: 2022-01-22

## 2022-01-19 RX ORDER — DOCUSATE SODIUM 100 MG/1
100 CAPSULE, LIQUID FILLED ORAL 2 TIMES DAILY
Status: DISCONTINUED | OUTPATIENT
Start: 2022-01-20 | End: 2022-01-25 | Stop reason: HOSPADM

## 2022-01-19 RX ORDER — ALBUTEROL SULFATE 2.5 MG/3ML
2.5 SOLUTION RESPIRATORY (INHALATION) EVERY 6 HOURS PRN
Status: DISCONTINUED | OUTPATIENT
Start: 2022-01-19 | End: 2022-01-19

## 2022-01-19 RX ORDER — SENNOSIDES 8.6 MG
1 TABLET ORAL
Status: DISCONTINUED | OUTPATIENT
Start: 2022-01-19 | End: 2022-01-25 | Stop reason: HOSPADM

## 2022-01-19 RX ORDER — HYDROMORPHONE HCL/PF 1 MG/ML
0.2 SYRINGE (ML) INJECTION EVERY 4 HOURS PRN
Status: DISCONTINUED | OUTPATIENT
Start: 2022-01-19 | End: 2022-01-25

## 2022-01-19 RX ORDER — POTASSIUM CHLORIDE 20 MEQ/1
40 TABLET, EXTENDED RELEASE ORAL ONCE
Status: COMPLETED | OUTPATIENT
Start: 2022-01-19 | End: 2022-01-19

## 2022-01-19 RX ORDER — ACETAMINOPHEN 325 MG/1
650 TABLET ORAL EVERY 6 HOURS PRN
Status: DISCONTINUED | OUTPATIENT
Start: 2022-01-19 | End: 2022-01-25

## 2022-01-19 RX ORDER — OXYCODONE HYDROCHLORIDE 5 MG/1
5 TABLET ORAL EVERY 4 HOURS PRN
Status: DISCONTINUED | OUTPATIENT
Start: 2022-01-19 | End: 2022-01-25

## 2022-01-19 RX ORDER — HEPARIN SODIUM 5000 [USP'U]/ML
5000 INJECTION, SOLUTION INTRAVENOUS; SUBCUTANEOUS EVERY 12 HOURS SCHEDULED
Status: DISCONTINUED | OUTPATIENT
Start: 2022-01-19 | End: 2022-01-19

## 2022-01-19 RX ORDER — POLYETHYLENE GLYCOL 3350 17 G/17G
17 POWDER, FOR SOLUTION ORAL ONCE
Status: COMPLETED | OUTPATIENT
Start: 2022-01-19 | End: 2022-01-19

## 2022-01-19 RX ORDER — POLYETHYLENE GLYCOL 3350 17 G/17G
17 POWDER, FOR SOLUTION ORAL ONCE
Status: COMPLETED | OUTPATIENT
Start: 2022-01-19 | End: 2022-01-20

## 2022-01-19 RX ORDER — LIDOCAINE WITH 8.4% SOD BICARB 0.9%(10ML)
SYRINGE (ML) INJECTION CODE/TRAUMA/SEDATION MEDICATION
Status: COMPLETED | OUTPATIENT
Start: 2022-01-19 | End: 2022-01-19

## 2022-01-19 RX ORDER — ALBUTEROL SULFATE 90 UG/1
2 AEROSOL, METERED RESPIRATORY (INHALATION) EVERY 4 HOURS PRN
Status: DISCONTINUED | OUTPATIENT
Start: 2022-01-19 | End: 2022-01-22

## 2022-01-19 RX ADMIN — PIPERACILLIN AND TAZOBACTAM 3.38 G: 36; 4.5 INJECTION, POWDER, FOR SOLUTION INTRAVENOUS at 01:34

## 2022-01-19 RX ADMIN — PIPERACILLIN AND TAZOBACTAM 3.38 G: 36; 4.5 INJECTION, POWDER, FOR SOLUTION INTRAVENOUS at 12:27

## 2022-01-19 RX ADMIN — OXYCODONE HYDROCHLORIDE AND ACETAMINOPHEN 1 TABLET: 5; 325 TABLET ORAL at 00:49

## 2022-01-19 RX ADMIN — FENTANYL CITRATE 50 MCG: 50 INJECTION, SOLUTION INTRAMUSCULAR; INTRAVENOUS at 11:51

## 2022-01-19 RX ADMIN — VANCOMYCIN HYDROCHLORIDE 1500 MG: 10 INJECTION, POWDER, LYOPHILIZED, FOR SOLUTION INTRAVENOUS at 13:55

## 2022-01-19 RX ADMIN — METOPROLOL TARTRATE 25 MG: 25 TABLET, FILM COATED ORAL at 16:28

## 2022-01-19 RX ADMIN — MIDAZOLAM 1 MG: 1 INJECTION INTRAMUSCULAR; INTRAVENOUS at 11:38

## 2022-01-19 RX ADMIN — FENTANYL CITRATE 50 MCG: 50 INJECTION, SOLUTION INTRAMUSCULAR; INTRAVENOUS at 11:38

## 2022-01-19 RX ADMIN — ATORVASTATIN CALCIUM 10 MG: 10 TABLET, FILM COATED ORAL at 12:54

## 2022-01-19 RX ADMIN — SODIUM CHLORIDE, SODIUM LACTATE, POTASSIUM CHLORIDE, AND CALCIUM CHLORIDE 100 ML/HR: .6; .31; .03; .02 INJECTION, SOLUTION INTRAVENOUS at 00:54

## 2022-01-19 RX ADMIN — OXYCODONE HYDROCHLORIDE AND ACETAMINOPHEN 1 TABLET: 5; 325 TABLET ORAL at 12:32

## 2022-01-19 RX ADMIN — PIPERACILLIN AND TAZOBACTAM 3.38 G: 36; 4.5 INJECTION, POWDER, FOR SOLUTION INTRAVENOUS at 17:30

## 2022-01-19 RX ADMIN — Medication 8 ML: at 11:35

## 2022-01-19 RX ADMIN — FENTANYL CITRATE 50 MCG: 50 INJECTION, SOLUTION INTRAMUSCULAR; INTRAVENOUS at 11:31

## 2022-01-19 RX ADMIN — EMTRICITABINE: 200 CAPSULE ORAL at 14:00

## 2022-01-19 RX ADMIN — HEPARIN SODIUM 5000 UNITS: 5000 INJECTION INTRAVENOUS; SUBCUTANEOUS at 10:23

## 2022-01-19 RX ADMIN — FENTANYL CITRATE 50 MCG: 50 INJECTION, SOLUTION INTRAMUSCULAR; INTRAVENOUS at 11:59

## 2022-01-19 RX ADMIN — PYRIDOXINE HCL TAB 50 MG 100 MG: 50 TAB at 12:54

## 2022-01-19 RX ADMIN — BISACODYL 10 MG: 10 SUPPOSITORY RECTAL at 14:13

## 2022-01-19 RX ADMIN — Medication 10 ML: at 11:53

## 2022-01-19 RX ADMIN — POTASSIUM CHLORIDE 40 MEQ: 1500 TABLET, EXTENDED RELEASE ORAL at 13:59

## 2022-01-19 RX ADMIN — INSULIN LISPRO 1 UNITS: 100 INJECTION, SOLUTION INTRAVENOUS; SUBCUTANEOUS at 16:28

## 2022-01-19 RX ADMIN — RIVAROXABAN 20 MG: 20 TABLET, FILM COATED ORAL at 12:54

## 2022-01-19 RX ADMIN — POLYETHYLENE GLYCOL 3350 17 G: 17 POWDER, FOR SOLUTION ORAL at 12:55

## 2022-01-19 RX ADMIN — METOPROLOL TARTRATE 25 MG: 25 TABLET, FILM COATED ORAL at 21:40

## 2022-01-19 RX ADMIN — OXYCODONE HYDROCHLORIDE AND ACETAMINOPHEN 1 TABLET: 5; 325 TABLET ORAL at 06:36

## 2022-01-19 RX ADMIN — OXYCODONE HYDROCHLORIDE AND ACETAMINOPHEN 1 TABLET: 5; 325 TABLET ORAL at 21:34

## 2022-01-19 RX ADMIN — MIDAZOLAM 0.5 MG: 1 INJECTION INTRAMUSCULAR; INTRAVENOUS at 11:51

## 2022-01-19 RX ADMIN — MIDAZOLAM 1 MG: 1 INJECTION INTRAMUSCULAR; INTRAVENOUS at 11:31

## 2022-01-19 RX ADMIN — VANCOMYCIN HYDROCHLORIDE 1000 MG: 1 INJECTION, SOLUTION INTRAVENOUS at 02:27

## 2022-01-19 RX ADMIN — HEPARIN SODIUM 5000 UNITS: 5000 INJECTION INTRAVENOUS; SUBCUTANEOUS at 00:49

## 2022-01-19 NOTE — QUICK NOTE
Per nursing staff patient complaining of chest pain and shortness of breath upon return from IR  Pain described as a pressure  Pain made better when sitting up right  Patient reports "it feels like I have been hit by a bus " Patient reports never having this pain in the past  Patient stating at 96% on room air  ECG, Troponin and CXR obtained Cardiology consulted and notified

## 2022-01-19 NOTE — PLAN OF CARE
Problem: GASTROINTESTINAL - ADULT  Goal: Maintains or returns to baseline bowel function  Description: INTERVENTIONS:  - Assess bowel function  - Encourage oral fluids to ensure adequate hydration  - Administer IV fluids if ordered to ensure adequate hydration  - Administer ordered medications as needed  - Encourage mobilization and activity  - Consider nutritional services referral to assist patient with adequate nutrition and appropriate food choices  Outcome: Progressing     Problem: PAIN - ADULT  Goal: Verbalizes/displays adequate comfort level or baseline comfort level  Description: Interventions:  - Encourage patient to monitor pain and request assistance  - Assess pain using appropriate pain scale  - Administer analgesics based on type and severity of pain and evaluate response  - Implement non-pharmacological measures as appropriate and evaluate response  - Consider cultural and social influences on pain and pain management  - Notify physician/advanced practitioner if interventions unsuccessful or patient reports new pain  Outcome: Progressing     Problem: INFECTION - ADULT  Goal: Absence or prevention of progression during hospitalization  Description: INTERVENTIONS:  - Assess and monitor for signs and symptoms of infection  - Monitor lab/diagnostic results  - Monitor endotracheal if appropriate and nasal secretions for changes in amount and color  - [place] appropriate cooling/warming therapies per order  - Administer medications as ordered  - Instruct and encourage patient and family to use good hand hygiene technique  - Identify and instruct in appropriate isolation precautions for identified infection/condition  Outcome: Progressing

## 2022-01-19 NOTE — RESPIRATORY THERAPY NOTE
Placed on continuous pulse oximeter due to Safet[employer] not working in pt room, alarms are not audible in hallway via nurse call valles system, RN Lyn Merino informed of this and I will submit a stat ticket for [facility]

## 2022-01-19 NOTE — SEDATION DOCUMENTATION
Procedure ended  Drainage tube placement x2 completed by Dr Talbot Kocher  Dressing to both sites  Specimen sent to lab  Patient returned to inpatient room   Report and care assumed by primary RN

## 2022-01-19 NOTE — H&P
H&P Exam - General Surgery   Kerri Prader 46 y o  male MRN: [id number]  Unit/Bed#: 2 [place] [address] Encounter: [id number]    Assessment/Plan     Assessment:  47 y/o m pmh of DM, protein C&S deficiency, enlarged prostate, bipolar presenting with RLQ pain  S/p laparoscopic appendectomy 01/11  CT a/p showing: fluid collection in right paracolic gutter, rim-enhancing fluid collection in the posterior right lobe of liver, mild diverticulosis in left colon with no active diverticulitis  WBC 14 5     Plan:  Npo  Patient reports last taking Xeralto on Saturday 1/15  IR consulted for percutaneous drainage  IV antibiotics Zosyn and Vancomycin  Pharmacy consulted for vancomycin management  Miralax daily  History of Present Illness     HPI:  Kerri Prader is a 46 y o  male  DM, protein C&S deficiency, enlarged prostate, bipolar presenting with RLQ pain  Patient is s/p laparoscopic appendectomy 1/11  RLQ abdominal Pain started on Thursday 1/15  Pain described as a dull ache  Patient reports pain became progressively worse causing him to present to the emergency dept  Patient reports he last took his xeralto on Saturday 1/15  Patient reports passing gas  Denies having bowel movements  Denies any nausea or vomiting  Denies any chest pain or shortness of breath  Patient intially present at [facility] emergency dept and transferred to [facility]  In emergency dept patient found to have a leukocytosis of 14 5  Ct a/p showing: fluid collection in right paracolic gutter, rim-enhancing fluid collection in the posterior right lobe of liver, mild diverticulosis in left colon with no active diverticulitis  Review of Systems   Constitutional: Negative for chills, fatigue and fever  HENT: Negative for congestion, ear pain, hearing loss, postnasal drip, sinus pressure, sinus pain and sore throat  Eyes: Negative for pain and discharge  Respiratory: Negative for chest tightness and shortness of breath      Cardiovascular: Negative for chest pain  Gastrointestinal: Positive for abdominal pain  Negative for constipation, nausea and vomiting  Genitourinary: Negative for difficulty urinating  Musculoskeletal: Negative for arthralgias and myalgias  Skin: Negative for rash  Neurological: Negative for dizziness and headaches  Psychiatric/Behavioral: Negative for behavioral problems  Historical Information   Past Medical History:   Diagnosis Date    Anemia     Anxiety     Arthritis     Asthma     Blood clot in vein 2017    right leg    Blood disorder     Chronic pain disorder     good control    Depression     Diabetes mellitus ([facility] )     type 2    Enlarged prostate     Epilepsy ([place] [address] )     past, last was 1 year ago    Hypertension     Protein S deficiency ([facility] )     on xarelto    Psychiatric disorder     bipolar    Psychiatric illness     Psychosis ([place] [address] )     Sleep apnea     snores but hasn't been tested    Vertigo      Past Surgical History:   Procedure Laterality Date    APPENDECTOMY LAPAROSCOPIC N/A 1/11/2022    Procedure: APPENDECTOMY LAPAROSCOPIC;  Surgeon: Antoine Sevilla MD;  Location: [facility] MAIN OR;  Service: General    CYST REMOVAL      ESOPHAGOGASTRODUODENOSCOPY N/A 11/9/2016    Procedure: ESOPHAGOGASTRODUODENOSCOPY (EGD); Surgeon: Brady Angeles MD;  Location:  GI LAB; Service:     ESOPHAGOGASTRODUODENOSCOPY N/A 9/2/2016    Procedure: ESOPHAGOGASTRODUODENOSCOPY (EGD); Surgeon: Brady Angeles MD;  Location:  GI LAB;   Service:     HERNIA REPAIR       Social History   Social History     Substance and Sexual Activity   Alcohol Use Yes    Comment: occassional     Social History     Substance and Sexual Activity   Drug Use Yes    Types: Marijuana     Social History     Tobacco Use   Smoking Status Current Some Day Smoker    Packs/day: 0 25    Years: 15 00    Pack years: 3 75    Types: Cigarettes   Smokeless Tobacco Current User   Tobacco Comment    pack a month     E-Cigarette/Vaping    E-Cigarette Use Never User      E-Cigarette/Vaping Substances    Nicotine No     THC No     CBD No     Flavoring No     Other No     Unknown No      Family History: non-contributory    Meds/Allergies   all medications and allergies reviewed  Allergies   Allergen Reactions    Fish-Derived Products - Food Allergy      Cod    Nuts - Food Allergy      Walnuts      Pollen Extract     Warfarin      Panic attacks    Watermelon [Citrullus Vulgaris]        Objective   First Vitals:   Blood Pressure: 127/77 (01/19/22 0021)  Pulse: 99 (01/19/22 0021)  Temperature: 98 6 °F (37 °C) (01/19/22 0021)  Temp Source: Temporal (01/19/22 0021)  Respirations: 20 (01/19/22 0021)  Height: 5' 10" (177 8 cm) (01/19/22 0021)  Weight - Scale: 81 6 kg (179 lb 14 3 oz) (01/19/22 0256)  SpO2: 100 % (01/19/22 0021)    Current Vitals:   Blood Pressure: 113/72 (01/19/22 0300)  Pulse: 84 (01/19/22 0300)  Temperature: 98 °F (36 7 °C) (01/19/22 0300)  Temp Source: Temporal (01/19/22 0300)  Respirations: 20 (01/19/22 0300)  Height: 5' 10" (177 8 cm) (01/19/22 0021)  Weight - Scale: 81 6 kg (179 lb 14 3 oz) (01/19/22 0256)  SpO2: 100 % (01/19/22 0300)    No intake or output data in the 24 hours ending 01/19/22 0730    Invasive Devices  Report    Peripheral Intravenous Line            Peripheral IV 01/19/22 Right Antecubital <1 day                Physical Exam  Vitals and nursing note reviewed  Constitutional:       Appearance: Normal appearance  HENT:      Head: Normocephalic and atraumatic  Nose: Nose normal       Mouth/Throat:      Mouth: Mucous membranes are moist    Eyes:      Extraocular Movements: Extraocular movements intact  Cardiovascular:      Rate and Rhythm: Normal rate and regular rhythm  Heart sounds: Normal heart sounds  Pulmonary:      Effort: Pulmonary effort is normal       Breath sounds: Normal breath sounds  Abdominal:      General: Abdomen is flat  A surgical scar is present   Bowel sounds are normal       Palpations: Abdomen is soft  Tenderness: There is abdominal tenderness in the right upper quadrant and right lower quadrant  There is no guarding or rebound  Hernia: No hernia is present  Comments: Laparoscopic port sites c/d/i intact  Healing nicely  Musculoskeletal:      Cervical back: Normal range of motion  Neurological:      Mental Status: He is alert  Lab Results: I have personally reviewed pertinent lab results  , CBC: No results found for: WBC, HGB, HCT, MCV, PLT, ADJUSTEDWBC, MCH, MCHC, RDW, MPV, NRBC, CMP: No results found for: SODIUM, K, CL, CO2, ANIONGAP, BUN, CREATININE, GLUCOSE, CALCIUM, AST, ALT, ALKPHOS, PROT, BILITOT, EGFR  Imaging: I have personally reviewed pertinent reports  EKG, Pathology, and Other Studies: I have personally reviewed pertinent reports  Counseling / Coordination of Care  Total floor / unit time spent today 60 minutes  Greater than 50% of total time was spent with the patient and / or family counseling and / or coordination of care  A description of the counseling / coordination of care:obtaining history, performing physical exam, reviewing pertinent labs imaging, discussing case with attending

## 2022-01-19 NOTE — UTILIZATION REVIEW
Initial Clinical Review    Admission: Date/Time/Statement:   Admission Orders (From admission, onward)     Ordered        01/19/22 0729  Inpatient Admission  Once                      Orders Placed This Encounter   Procedures    Inpatient Admission     Standing Status:   Standing     Number of Occurrences:   1     Order Specific Question:   Level of Care     Answer:   Med Surg [16]     Order Specific Question:   Estimated length of stay     Answer:   Not Applicable       Initial Presentation:   46 y o  male  DM, protein C&S deficiency, enlarged prostate, bipolar presenting with RLQ pain  Patient is s/p laparoscopic appendectomy 1/11  RLQ abdominal Pain started on Thursday 1/15  Pain described as a dull ache  Patient reports pain became progressively worse causing him to present to the emergency dept  Patient reports he last took his xeralto on Saturday 1/15  Patient reports passing gas  Denies having bowel movements  Denies any nausea or vomiting  Denies any chest pain or shortness of breath  Patient intially present at [facility] emergency dept and transferred to [facility]  In emergency dept patient found to have a leukocytosis of 14 5  Ct a/p showing: fluid collection in right paracolic gutter, rim-enhancing fluid collection in the posterior right lobe of liver, mild diverticulosis in left colon with no active diverticulitis  Assessment:  45 y/o m pmh of DM, protein C&S deficiency, enlarged prostate, bipolar presenting with RLQ pain  S/p laparoscopic appendectomy 01/11  CT a/p showing: fluid collection in right paracolic gutter, rim-enhancing fluid collection in the posterior right lobe of liver, mild diverticulosis in left colon with no active diverticulitis  WBC 14 5   Plan:  Npo  Patient reports last taking Xeralto on Saturday 1/15  IR consulted for percutaneous drainage  IV antibiotics Zosyn and Vancomycin  Pharmacy consulted for vancomycin management  Miralax daily      Date: 1/19/22   Day 2:   IR CONSULT 64-year-old male with history of recent appendectomy for appendicitis presented to [facility] and was found to have liver abscess as well as fluid in the right paracolic gutter concerning for abscess  Patient is referred for drainage of the abscesses  plan for possible aspiration versus drainage catheter placement into the liver abscess and right paracolic gutter abscess  if the liver abscess is too small to aspirate, will plan on no intervention  outside hospital images are not available, therefore, will plan to do the procedure in CT to assess for fluid collections  please keep patient NPO  IR OP NOTE  Procedure:   1  Right paracolic abscess drain placement  2  Liver abscess drain placement  Specimens:   1  10 mL dark bloody fluid aspirated from right paracolic abscess drain, sample sent to lab  2  10 mL greenish purulent fluid aspirated from liver abscess drain, sample sent to lab  Findings:   1  Successful right paracolic abscess drain placement using 10 Arabic catheter  2  Successful right inferior liver abscess drain placement using 10 Arabic catheter  PER SURGEON  Postprocedure IR drainage for collection  Chest pain  Plan  Chest x-ray to rule out the pneumothorax patient saturating well on room air  Cardiology evaluation  EKG troponin  Pain medication  Possible small Advanced diet and the MiraLax for constipation  Continue antibiotics  CARDIOLOGY CONSULT  Assessment:  1  Chest pain in patient with cardiac comorbidities of diabetes  2  Abdominal abscess status post laparoscopic appendectomy  3  Protein C and S deficiency  4  Diabetes with hemoglobin A1c of 8 2  5  Constipation  Plan:  Patient has been admitted to the general surgery service  1  Will obtain 2D echocardiogram to evaluate cardiac function, structure wall motion  2  Will start low-dose beta-blocker with Lopressor 25 mg q 12 hours monitoring blood pressure and heart rate per parameters  3   Trend troponins, 1st troponin is currently pendin  4  Resume Xarelto when cleared by primary team  5  Schedule Lexiscan nuclear stress test for a m  Of 01/20/2022 to evaluate for ischemia   Date 1/20/22 Stress Testing  ATTENDING  SEPSIS 2/2 liver abscesses post drain placement IR, currently on IV Zosyn/Vancomycin   Body fluid cultures growing gram-negative rods, Gram-positive rods, Gram-positive cocci in pairs and chains and non lactose fermenting gram-negative hector  Blood cultures were ordered which are pending  Patient's white count has increased to 16,000 from 12,000  Monitor fever curve and white count  CHEST PAIN for stress testing today , no further chest pain   Stress: Stress ECG: The stress ECG is negative for ischemia after pharmacologic stress  No ST deviation is noted  Perfusion: There are no perfusion defects  Stress Function: Left ventricular function post-stress is normal  Post-stress ejection fraction is 61 %    Stress Combined Conclusion: The ECG and SPECT imaging portions of the stress study are concordant with no evidence of stress induced myocardial ischemia        ED Triage Vitals   Temperature Pulse Respirations Blood Pressure SpO2   01/19/22 0021 01/19/22 0021 01/19/22 0021 01/19/22 0021 01/19/22 0021   98 6 °F (37 °C) 99 20 127/77 100 %      Temp Source Heart Rate Source Patient Position - Orthostatic VS BP Location FiO2 (%)   01/19/22 0021 -- 01/19/22 0021 01/19/22 0021 --   Temporal  Lying Right arm       Pain Score       01/18/22 2353       8          Wt Readings from Last 1 Encounters:   01/19/22 81 6 kg (179 lb 14 3 oz)     Additional Vital Signs:   01/19/22 11:32:29 -- -- -- -- -- 28 2 L/min Nasal cannula --   01/19/22 11:32:24 -- 81 20 129/80 100 % -- -- -- --   01/19/22 11:22:59 -- 82 16 130/73 98 % -- -- -- --   01/19/22 0746 96 3 °F (35 7 °C) Abnormal  84 -- 121/58 93 % -- -- -- --   01/19/22 0300 98 °F (36 7 °C) 84 20 113/72 100 %         Pertinent Labs/Diagnostic Test Results:   1/19/22 IR drainage tube placement: Past Medical History:   Diagnosis Date    Anemia     Anxiety     Arthritis     Asthma     Blood clot in vein 2017    right leg    Blood disorder     Chronic pain disorder     good control    Depression     Diabetes mellitus ([facility] )     type 2    Enlarged prostate     Epilepsy ([place] [address] )     past, last was 1 year ago    Hypertension     Protein S deficiency ([facility] )     on xarelto    Psychiatric disorder     bipolar    Psychiatric illness     Psychosis ([facility] )     Sleep apnea     snores but hasn't been tested    Vertigo      Present on Admission:   Liver abscess   Intra-abdominal abscess post-procedure   Schizoaffective disorder, bipolar type ([facility])   Generalized anxiety disorder   Cannabis abuse, episodic   Epilepsy ([facility] )   Benign prostatic hyperplasia with urinary frequency   Type 2 diabetes mellitus without complication, without long-term current use of insulin (HCC)   Acute perforated appendicitis   Protein S deficiency ([place] [address] )   Other constipation      Admitting Diagnosis: Post-operative complication [E34  9XXA]  Age/Sex: 46 y o  male  Admission Orders:  gmf  NPO  vanco trough   Bmp cbc diff  IR drainage tube placement  Stress testing  Cbc diff bmp  Blood cx x2  Scheduled Medications:  heparin (porcine), 5,000 Units, Subcutaneous, Q12H EDILMA  piperacillin-tazobactam, 3 375 g, Intravenous, Q6H  polyethylene glycol, 17 g, Oral, Once  vancomycin, 1,500 mg, Intravenous, Q12H      Continuous IV Infusions:  lactated ringers, 100 mL/hr, Intravenous, Continuous      PRN Meds:  oxyCODONE-acetaminophen, 1 tablet, Oral, Q4H PRN        INPATIENT CONSULT TO IR  IP CONSULT TO INTERNAL MEDICINE  IP CONSULT TO PHARMACY    Network Utilization Review Department  ATTENTION: Please call with any questions or concerns to [phone] and carefully listen to the prompts so that you are directed to the right person   All voicemails are confidential   [facility] all requests for admission clinical reviews, approved or denied determinations and any other requests to dedicated fax number below belonging to the campus where the patient is receiving treatment   List of dedicated fax numbers for the Facilities:  [address] [place] [address] DENIALS (Administrative/Medical Necessity) [phone]   [address] (Maternity/NICU/Pediatrics) [phone] [address]  [address] [facility] [place] [address] [address] [place] [address] [address] [place] [address] [facility] [place] [address] [phone]

## 2022-01-19 NOTE — CONSULTS
Consultation - Cardiology   Julianna Huber 46 y o  male MRN: [id number]  Unit/Bed#: [address] [place] [address] Encounter: [id number]    Assessment/Plan     Assessment:  1  Chest pain in patient with cardiac comorbidities of diabetes  2  Abdominal abscess status post laparoscopic appendectomy  3  Protein C and S deficiency  4  Diabetes with hemoglobin A1c of 8 2  5  Constipation      Plan:  Patient has been admitted to the general surgery service  1  Will obtain 2D echocardiogram to evaluate cardiac function, structure wall motion    2  Will start low-dose beta-blocker with Lopressor 25 mg q 12 hours monitoring blood pressure and heart rate per parameters    3  Trend troponins, 1st troponin is currently pending    4  Resume Xarelto when cleared by primary team    5  Schedule Lexiscan nuclear stress test for a m  Of 01/20/2022 to evaluate for ischemia       History of Present Illness   Physician Requesting Consult: Toney Nance MD  Reason for Consult / Principal Problem:  Chest pain    HPI: Julianna Huber is a 46y o  year old male who was admitted on 01/18/2022 after he presented to the emergency room at [facility] with complaints of abdominal pain  Of note, patient had a laparoscopic appendectomy at [facility] on 01/11/2022  He was noted on CT scan to have abscess in the paracolic area and also posterior inferior liver abscess  Patient was transferred to [address] [place] [address] for further care  He underwent placement of 2 drains in interventional radiology today  While having his procedure done he was noted to become hypertensive  Upon return to the unit, patient complained of midsternal chest discomfort and shortness of breath  He verbalizes that he has never experienced this discomfort before  He does note he has not had a bowel movement for 4-5 days and feels the discomfort is due to the bowel movement    Patient does remain hypertensive at this time and 12 lead EKG was performed which demonstrated sinus rhythm at a rate of 90 but T-wave inversion was seen in the precordial leads  This is changed from his EKG of August 2021  Patient's history is consistent for hypertension with a hemoglobin A1c of 8 2, protein C and protein S deficiency for which she does take Xarelto which was currently on hold, and bipolar disorder  He notes that there is family history for coronary artery disease but states it did not occur till they were "later in life"  High sensitivity troponin is currently pending  Patient states that he has never had any cardiac testing  Inpatient consult to Cardiology  Consult performed by: JONELLE Quintanilla  Consult ordered by: Harris Morris PA-C          Review of Systems   Constitutional: Negative  Negative for activity change, appetite change, fatigue and fever  HENT: Negative  Negative for congestion, postnasal drip, sore throat and trouble swallowing  Eyes: Negative  Negative for photophobia and visual disturbance  Respiratory: Positive for chest tightness and shortness of breath  Cardiovascular: Positive for chest pain  Negative for palpitations and leg swelling  Gastrointestinal: Positive for constipation  Endocrine: Negative  Negative for polydipsia, polyphagia and polyuria  Genitourinary: Negative  Negative for difficulty urinating  Musculoskeletal: Negative  Skin: Negative  Neurological: Negative  Negative for dizziness, tremors, speech difficulty and light-headedness  Hematological: Negative  Psychiatric/Behavioral: Negative  Negative for agitation         Historical Information   Past Medical History:   Diagnosis Date    Anemia     Anxiety     Arthritis     Asthma     Blood clot in vein 2017    right leg    Blood disorder     Chronic pain disorder     good control    Depression     Diabetes mellitus ([facility] )     type 2    Enlarged prostate     Epilepsy ([facility] )     past, last was 1 year ago    Hypertension     Protein S deficiency ([facility] )     on xarelto    Psychiatric disorder     bipolar    Psychiatric illness     Psychosis ([facility] )     Sleep apnea     snores but hasn't been tested    Vertigo      Past Surgical History:   Procedure Laterality Date    APPENDECTOMY LAPAROSCOPIC N/A 1/11/2022    Procedure: APPENDECTOMY LAPAROSCOPIC;  Surgeon: Herman Claire MD;  Location: [facility] MAIN OR;  Service: General    CYST REMOVAL      ESOPHAGOGASTRODUODENOSCOPY N/A 11/9/2016    Procedure: ESOPHAGOGASTRODUODENOSCOPY (EGD); Surgeon: Modesto Schwartz MD;  Location:  GI LAB; Service:     ESOPHAGOGASTRODUODENOSCOPY N/A 9/2/2016    Procedure: ESOPHAGOGASTRODUODENOSCOPY (EGD); Surgeon: Modesto Schwartz MD;  Location:  GI LAB;   Service:     HERNIA REPAIR      IR DRAINAGE TUBE PLACEMENT  1/19/2022     Social History     Substance and Sexual Activity   Alcohol Use Yes    Comment: occassional     Social History     Substance and Sexual Activity   Drug Use Yes    Types: Marijuana     E-Cigarette/Vaping    E-Cigarette Use Never User      E-Cigarette/Vaping Substances    Nicotine No     THC No     CBD No     Flavoring No     Other No     Unknown No      Social History     Tobacco Use   Smoking Status Current Some Day Smoker    Packs/day: 0 25    Years: 15 00    Pack years: 3 75    Types: Cigarettes   Smokeless Tobacco Current User   Tobacco Comment    pack a month     Family History:   Family History   Problem Relation Age of Onset    No Known Problems Mother     Colon cancer Father 61    No Known Problems Sister     No Known Problems Brother     No Known Problems Maternal Aunt     No Known Problems Paternal Aunt     No Known Problems Maternal Uncle     No Known Problems Paternal Uncle     No Known Problems Maternal Grandfather     No Known Problems Maternal Grandmother     No Known Problems Paternal Grandfather     Hypertension Paternal Grandmother     No Known Problems Cousin     Prostate cancer Family     Stroke Family     Diabetes Family         MELLITUS    Coronary artery disease Family        Meds/Allergies   all current active meds have been reviewed, current meds:   Current Facility-Administered Medications   Medication Dose Route Frequency    albuterol (PROVENTIL HFA,VENTOLIN HFA) inhaler 2 puff  2 puff Inhalation Q4H PRN    albuterol inhalation solution 2 5 mg  2 5 mg Nebulization Q6H PRN    atorvastatin (LIPITOR) tablet 10 mg  10 mg Oral Daily    emtricitabine-tenofovir (TRUVADA 200-300) combo dose   Oral Daily    HYDROmorphone (DILAUDID) injection 0 2 mg  0 2 mg Intravenous Q4H PRN    insulin lispro (HumaLOG) 100 units/mL subcutaneous injection 1-5 Units  1-5 Units Subcutaneous TID AC    lactated ringers infusion  75 mL/hr Intravenous Continuous    metoprolol tartrate (LOPRESSOR) tablet 25 mg  25 mg Oral Q12H EDILMA    ondansetron (ZOFRAN) injection 4 mg  4 mg Intravenous Q6H PRN    oxyCODONE-acetaminophen (PERCOCET) 5-325 mg per tablet 1 tablet  1 tablet Oral Q4H PRN    piperacillin-tazobactam (ZOSYN) 3 375 g in sodium chloride 0 9 % 100 mL IVPB  3 375 g Intravenous Q6H    pyridoxine (VITAMIN B6) tablet 100 mg  100 mg Oral Daily    rivaroxaban (XARELTO) tablet 20 mg  20 mg Oral Q24H    vancomycin (VANCOCIN) 1500 mg in sodium chloride 0 9% 250 mL IVPB  1,500 mg Intravenous Q12H    and PTA meds:   Prior to Admission Medications   Prescriptions Last Dose Informant Patient Reported? Taking?    [facility] 1[facility]17[facility] at Unknown time  No Yes   Sig: by Does not apply route daily   ACCU-CHEK GUIDE test strip   Yes No   Blood Glucose Monitoring Suppl (ACCU-CHEK GUIDE) w/Device KIT 1/17/2022 at Unknown time  No Yes   Sig: by Does not apply route daily   albuterol (ProAir HFA) 90 mcg/act inhaler Past Week at Unknown time  No Yes   Sig: Inhale 2 puffs every 4 (four) hours as needed for wheezing or shortness of breath   atorvastatin (LIPITOR) 10 mg tablet 1/17/2022 at Unknown time  No Yes   Sig: Take 1 tablet (10 mg total) by mouth daily   emtricitabine-tenofovir (Truvada) 200-300 mg per tablet 1/17/2022 at Unknown time  No Yes   Sig: Take 1 tablet by mouth every 24 hours   isoniazid (NYDRAZID) 100 mg tablet Not Taking at Unknown time Self Yes No   Sig: Take 100 mg by mouth daily   Patient not taking: Reported on 1/18/2022    metFORMIN (GLUCOPHAGE) 1000 MG tablet 1/17/2022 at Unknown time  No Yes   Sig: Take 1 tablet (1,000 mg total) by mouth 2 (two) times a day with meals   oxyCODONE (ROXICODONE) 5 immediate release tablet   No No   Sig: Take 1 tablet (5 mg total) by mouth every 4 (four) hours as needed for severe pain for up to 5 days Max Daily Amount: 30 mg   pyridoxine (VITAMIN B6) 100 mg tablet 1/17/2022 at Unknown time Self Yes Yes   Sig: Take 100 mg by mouth daily     rivaroxaban (Xarelto) 20 mg tablet Not Taking at Unknown time  No No   Sig: Take 1 tablet (20 mg total) by mouth every 24 hours   Patient not taking: Reported on 1/18/2022       Facility-Administered Medications: None     Allergies   Allergen Reactions    Fish-Derived Products - Food Allergy      Cod    Nuts - Food Allergy      Walnuts      Pollen Extract     Warfarin      Panic attacks    Watermelon [Citrullus Vulgaris]        Objective   Vitals: Blood pressure 167/97, pulse 82, temperature (!) 96 3 °F (35 7 °C), resp  rate 16, height 5' 10" (1 778 m), weight 81 6 kg (179 lb 14 3 oz), SpO2 100 %    Orthostatic Blood Pressures      Most Recent Value   Blood Pressure 167/97 filed at 01/19/2022 1202   Patient Position - Orthostatic VS Lying filed at 01/19/2022 0300            Intake/Output Summary (Last 24 hours) at 1/19/2022 1416  Last data filed at 1/19/2022 1158  Gross per 24 hour   Intake 900 ml   Output 172 ml   Net 728 ml       Invasive Devices  Report    Peripheral Intravenous Line            Peripheral IV 01/19/22 Right Antecubital <1 day          Drain            Closed/Suction Drain Lateral RUQ Bulb 10 Fr  <1 day    Closed/Suction Drain Right RLQ Bulb 10 Fr  <1 day                Physical Exam  Vitals and nursing note reviewed  Constitutional:       General: He is not in acute distress  Appearance: Normal appearance  He is normal weight  HENT:      Head: Normocephalic  Right Ear: External ear normal       Left Ear: External ear normal    Eyes:      General: No scleral icterus  Right eye: No discharge  Left eye: No discharge  Cardiovascular:      Rate and Rhythm: Normal rate and regular rhythm  Pulses: Normal pulses  Heart sounds: No murmur heard  Pulmonary:      Effort: Pulmonary effort is normal  No respiratory distress  Breath sounds: Normal breath sounds  No wheezing  Abdominal:      General: Bowel sounds are normal  There is no distension  Palpations: Abdomen is soft  Musculoskeletal:      Cervical back: Normal range of motion and neck supple  No rigidity  Right lower leg: No edema  Left lower leg: No edema  Skin:     General: Skin is warm and dry  Capillary Refill: Capillary refill takes less than 2 seconds  Neurological:      General: No focal deficit present  Mental Status: He is alert and oriented to person, place, and time  Mental status is at baseline  Psychiatric:         Mood and Affect: Mood normal          Lab Results:   I have personally reviewed pertinent lab results      CBC with diff:   Results from last 7 days   Lab Units 01/19/22  1326   WBC Thousand/uL 12 10*   RBC Million/uL 4 38   HEMOGLOBIN g/dL 11 9*   HEMATOCRIT % 36 4*   MCV fL 83   MCH pg 27 2   MCHC g/dL 32 7   RDW % 13 5   MPV fL 8 8*   PLATELETS Thousands/uL 447*     CMP:   Results from last 7 days   Lab Units 01/19/22  1326   SODIUM mmol/L 138   POTASSIUM mmol/L 3 5   CHLORIDE mmol/L 98*   CO2 mmol/L 29   BUN mg/dL 10   CREATININE mg/dL 1 11   CALCIUM mg/dL 8 9   EGFR ml/min/1 73sq m 75     Troponin:   0   Lab Value Date/Time    TROPONINI <0 03 08/24/2021 1803    TROPONINI <0 03 08/24/2021 [place] <0 01 05/04/2020 1759    TROPONINI <0 01 02/16/2020 1608    TROPONINI <0 01 07/02/2019 2200     BNP:   Results from last 7 days   Lab Units 01/19/22  1326   POTASSIUM mmol/L 3 5   CHLORIDE mmol/L 98*   CO2 mmol/L 29   BUN mg/dL 10   CREATININE mg/dL 1 11   CALCIUM mg/dL 8 9   EGFR ml/min/1 73sq m 75     Imaging: I have personally reviewed pertinent reports      EKG:  Sinus rhythm with nonspecific T-wave abnormality seen in the precordial leads  VTE Prophylaxis: Sequential compression device Patrick Rodriguez     Code Status: Prior  Advance Directive and Living Will:      Power of :    POLST:      Fabby Birmingham  Cardiology

## 2022-01-19 NOTE — CASE MANAGEMENT
Case Management Assessment & Discharge Planning Note    Patient name Ovidio Gaffney  Location [address] [place] [address] [place] [address] MRN [id number]  : 1970 Date 2022       Current Admission Date: 2022  Current Admission Diagnosis:Liver abscess   Patient Active Problem List    Diagnosis Date Noted    Liver abscess 2022    Intra-abdominal abscess post-procedure 2022    Other constipation 2022    Acute perforated appendicitis 2022    Current use of long term anticoagulation 2022    Syphilis 2021    Gross hematuria 2021    High risk sexual behavior 10/14/2020    Positive QuantiFERON-TB Gold test 2020    Post-traumatic bulbous urethral stricture 2019    Spermatocele of epididymis 2019    Encounter for wellness examination 2019    Tobacco dependence 2019    Testicle lump 2019    Other male erectile dysfunction 2019    Near syncope 2018    Deep venous thrombosis ([facility] 2018    HIV exposure 2018    Mild intermittent asthma without complication     Protein S deficiency ([facility] ) 2018    Positive RPR test 2016    Peptic ulcer disease 2016    Schizoaffective disorder, bipolar type ([facility] ) 2016    Generalized anxiety disorder 2016    Cannabis abuse, episodic 2016    Dyslexia, developmental 2016    Benign prostatic hyperplasia with urinary frequency 2016    Type 2 diabetes mellitus without complication, without long-term current use of insulin [facility] 2016    Epilepsy ([facility] ) 2016      LOS (days): 1  Geometric Mean LOS (GMLOS) (days): 3 20  Days to GMLOS:2 4     OBJECTIVE:  PATIENT READMITTED TO HOSPITAL  Risk of Unplanned Readmission Score: 16   Bundled Patient Payment: No Current Bundle     Current admission status: Inpatient  Referral Reason: Other (Discharge planning)    Preferred Pharmacy:   [facility] [facility],[address], [place] - [address]  Phone: [phone] Fax: [phone]    [facility] [address] [facility], [address] [place] [address]  Phone: [phone] Fax: [phone]    [address] [place] [address]  Phone: [phone] Fax: [phone]    Primary Care Provider: No primary care provider on file  Primary Insurance: [facility] REP  Secondary Insurance: [address] [place] [address] St     ASSESSMENT:  Active Health Care Agents    There are no active Health Care Agents on file  Readmission Root Cause  30 Day Readmission: Yes  Who directed you to return to the hospital?: Other (comment) ([facility])  Did you understand whom to contact if you had questions or problems?: Yes  Did you get your prescriptions before you left the hospital?: No  Reason[de-identified] Delivery service not offered  Were you able to get your prescriptions filled when you left the hospital?: Yes  Did you take your medications as prescribed?: Yes  Were you able to get to your follow-up appointments?:  (Patient states that none were scheduled)  During previous admission, was a post-acute recommendation made?: No  Patient was readmitted due to: Right flank pain s/p laparoscopic appendectomy  Action Plan: Medical management    Patient Information  Admitted from[de-identified] Home  Mental Status: Alert  During Assessment patient was accompanied by: Not accompanied during assessment  Assessment information provided by[de-identified] Patient  Primary Caregiver: Self  Support Systems: [facility] of Residence: [address] [place] [address] do you live in?: [address] entry access options   Select all that apply : No steps to enter home  Type of Current Residence: Apartment  Floor Level: 3  Upon entering residence, is there a bedroom on the main floor (no further steps)?: Yes  Upon entering residence, is there a bathroom on the main floor (no further steps)?: Yes  In the last 12 months, was there a time when you were not able to pay the mortgage or rent on time?: No  In the last 12 months, how many places have you lived?: 2  In the last 12 months, was there a time when you did not have a steady place to sleep or slept in a shelter (including now)?: No  Homeless/housing insecurity resource given?: N/A  Living Arrangements: Lives Alone  Is patient a ?: No    Activities of Daily Living Prior to Admission  Functional Status: Independent  Completes ADLs independently?: Yes  Ambulates independently?: Yes  Does patient use assisted devices?: No  Does patient currently own DME?: No  Does patient have a history of Outpatient Therapy (PT/OT)?: No  Does the patient have a history of Short-Term Rehab?: No  Does patient have a history of HHC?: Yes (Several years ago in [facility] per patient)  Does patient currently have [address]?: No     Patient Information Continued  Income Source: SSI/SSD  Does patient have prescription coverage?: Yes  Within the past 12 months, you worried that your food would run out before you got the money to buy more : Never true  Within the past 12 months, the food you bought just didnt last and you didnt have money to get more : Never true  Food insecurity resource given?: N/A  Does patient receive dialysis treatments?: No  Does patient have a history of substance abuse?: Yes  Is patient currently in treatment for substance abuse?: N/A - sober (Patient states that he has been sober for 20 years)  Does patient have a history of Mental Health Diagnosis?: Yes  Has patient received inpatient treatment related to mental health in the last 2 years?: No (Hospitalized about 3 yrs ago per patient at [facility])     [employer]  [employer] of Transport to [facility] [employer]de-identified[email or web address] - [employer]  In the past 12 months, has lack of transportation kept you from medical appointments or from getting medications?: No  In the past 12 months, has lack of transportation kept you from meetings, work, or from getting things needed for daily living?: No  Was application for public transport provided?: N/A    DISCHARGE DETAILS:    Discharge planning discussed with[de-identified] Patient  Freedom of Choice: Yes  Comments - Freedom of Choice: FOC reviewed with patient  SW will continue to discuss as discharge recommendations are made     Contacts  Patient Contacts: [employer] ()  Relationship to Patient[de-identified] Friend    MERVIN spoke with patient at bedside to introduce role of CM and obtain assessment information  Patient lives alone in a room at the [facility] in [place]  He stated that he is not able to have a refrigerator or a microwave in his room  He is concerned about being able to prepare food for himself when he returns home from the hospital   He relies on friends for transportation to appointments and errands and does use public transportation when no one is available  He usually uses [facility] in [facility] but would like a local pharmacy that delivers  Patient reported that he has a history of substance abuse but that it was in the distant past, over 20 years ago  Patient stated that he was hospitalized for psychiatric reasons about 3 years ago at [facility]  Patient's emergency contact is [employer], a  at [employer] the [employer]  Patient gave permission for Stephanie Peres to be contacted for updates and additional information  Patient has not yet been medically cleared for discharge at this time  SW will continue to follow for discharge planning needs

## 2022-01-19 NOTE — PLAN OF CARE
Problem: GASTROINTESTINAL - ADULT  Goal: Maintains or returns to baseline bowel function  Description: INTERVENTIONS:  - Assess bowel function  - Encourage oral fluids to ensure adequate hydration  - Administer IV fluids if ordered to ensure adequate hydration  - Administer ordered medications as needed  - Encourage mobilization and activity  - Consider nutritional services referral to assist patient with adequate nutrition and appropriate food choices  Outcome: Progressing     Problem: PAIN - ADULT  Goal: Verbalizes/displays adequate comfort level or baseline comfort level  Description: Interventions:  - Encourage patient to monitor pain and request assistance  - Assess pain using appropriate pain scale  - Administer analgesics based on type and severity of pain and evaluate response  - Implement non-pharmacological measures as appropriate and evaluate response  - Consider cultural and social influences on pain and pain management  - Notify physician/advanced practitioner if interventions unsuccessful or patient reports new pain  Outcome: Progressing     Problem: INFECTION - ADULT  Goal: Absence or prevention of progression during hospitalization  Description: INTERVENTIONS:  - Assess and monitor for signs and symptoms of infection  - Monitor lab/diagnostic results  - Monitor endotracheal if appropriate and nasal secretions for changes in amount and color  - Banks appropriate cooling/warming therapies per order  - Administer medications as ordered  - Instruct and encourage patient and family to use good hand hygiene technique  - Identify and instruct in appropriate isolation precautions for identified infection/condition  Outcome: Progressing

## 2022-01-19 NOTE — BRIEF OP NOTE (RAD/CATH)
INTERVENTIONAL RADIOLOGY PROCEDURE NOTE    Date: 1/19/2022    Procedure:   1  Right paracolic abscess drain placement  2  Liver abscess drain placement  Preoperative diagnosis:   1  Intra-abdominal abscess post-procedure    2  Type 2 diabetes mellitus without complication, without long-term current use of insulin ([facility] )    3  Liver abscess         Postoperative diagnosis: Same  Surgeon: Lisbet Lemus MD     Assistant: None  No qualified resident was available  Blood loss: 1 mL    Specimens:   1  10 mL dark bloody fluid aspirated from right paracolic abscess drain, sample sent to lab  2  10 mL greenish purulent fluid aspirated from liver abscess drain, sample sent to lab  Findings:   1  Successful right paracolic abscess drain placement using 10 [place] catheter  2  Successful right inferior liver abscess drain placement using 10 [place] catheter  Complications: None immediate      Anesthesia: conscious sedation and local

## 2022-01-19 NOTE — CONSULTS
e-Consult (IPC)  - Interventional Radiology  Alonza Plan 46 y o  male MRN: [id number]  Unit/Bed#: [address] [place] [address] Encounter: [id number]    IR has been consulted to evaluate the patient, determine the appropriate procedure, and whether or not a procedure can and should be performed regarding the care of Alonza Plan  IP Consult to IR  Consult performed by: Kristy Beltran MD  Consult ordered by: Nidhi Servin MD        01/19/22      Assessment/Recommendation:   47-year-old male with history of recent appendectomy for appendicitis presented to [facility] and was found to have liver abscess as well as fluid in the right paracolic gutter concerning for abscess  Patient is referred for drainage of the abscesses  - plan for possible aspiration versus drainage catheter placement into the liver abscess and right paracolic gutter abscess  - if the liver abscess is too small to aspirate, will plan on no intervention   - outside hospital images are not available, therefore, will plan to do the procedure in CT to assess for fluid collections  - please keep patient NPO  Total time spent in review of data, discussion with requesting provider and rendering advice was 15 min  Thank you for allowing Interventional Radiology to participate in the care of Alonza Plan  Please don't hesitate to call or TigerText us with any questions       Kristy Beltran MD

## 2022-01-20 ENCOUNTER — APPOINTMENT (OUTPATIENT)
Dept: RADIOLOGY | Facility: HOSPITAL | Age: 52
DRG: 871 | End: 2022-01-20
Payer: COMMERCIAL

## 2022-01-20 ENCOUNTER — APPOINTMENT (OUTPATIENT)
Dept: NON INVASIVE DIAGNOSTICS | Facility: HOSPITAL | Age: 52
DRG: 871 | End: 2022-01-20
Payer: COMMERCIAL

## 2022-01-20 ENCOUNTER — APPOINTMENT (INPATIENT)
Dept: RADIOLOGY | Facility: HOSPITAL | Age: 52
DRG: 871 | End: 2022-01-20
Payer: COMMERCIAL

## 2022-01-20 LAB
2HR DELTA HS TROPONIN: 1 NG/L
ANION GAP SERPL CALCULATED.3IONS-SCNC: 11 MMOL/L (ref 4–13)
AORTIC ROOT: 3.6 CM
APICAL FOUR CHAMBER EJECTION FRACTION: 45 %
AV LVOT PEAK GRADIENT: 4 MMHG
AV PEAK GRADIENT: 6 MMHG
BUN SERPL-MCNC: 8 MG/DL (ref 5–25)
CALCIUM SERPL-MCNC: 9.2 MG/DL (ref 8.3–10.1)
CARDIAC TROPONIN I PNL SERPL HS: 5 NG/L
CHEST PAIN STATEMENT: NORMAL
CHLORIDE SERPL-SCNC: 97 MMOL/L (ref 100–108)
CHOLEST SERPL-MCNC: 126 MG/DL
CO2 SERPL-SCNC: 26 MMOL/L (ref 21–32)
CREAT SERPL-MCNC: 1.13 MG/DL (ref 0.6–1.3)
DOP CALC LVOT AREA: 3.14 CM2
DOP CALC LVOT DIAMETER: 2 CM
E WAVE DECELERATION TIME: 254 MS
ERYTHROCYTE [DISTWIDTH] IN BLOOD BY AUTOMATED COUNT: 13.3 % (ref 11.6–15.1)
FRACTIONAL SHORTENING: 25 % (ref 28–44)
GFR SERPL CREATININE-BSD FRML MDRD: 73 ML/MIN/1.73SQ M
GLUCOSE SERPL-MCNC: 174 MG/DL (ref 65–140)
GLUCOSE SERPL-MCNC: 193 MG/DL (ref 65–140)
GLUCOSE SERPL-MCNC: 194 MG/DL (ref 65–140)
GLUCOSE SERPL-MCNC: 211 MG/DL (ref 65–140)
GLUCOSE SERPL-MCNC: 215 MG/DL (ref 65–140)
HCT VFR BLD AUTO: 35.3 % (ref 36.5–49.3)
HDLC SERPL-MCNC: 11 MG/DL
HGB BLD-MCNC: 12 G/DL (ref 12–17)
INTERVENTRICULAR SEPTUM IN DIASTOLE (PARASTERNAL SHORT AXIS VIEW): 1.2 CM
LDLC SERPL CALC-MCNC: 90 MG/DL (ref 0–100)
LEFT ATRIUM AREA SYSTOLE SINGLE PLANE A4C: 15.2 CM2
LEFT ATRIUM SIZE: 3.3 CM
LEFT INTERNAL DIMENSION IN SYSTOLE: 3 CM (ref 2.1–4)
LEFT VENTRICULAR INTERNAL DIMENSION IN DIASTOLE: 4 CM (ref 4.83–7.19)
LEFT VENTRICULAR POSTERIOR WALL IN END DIASTOLE: 1.1 CM
LEFT VENTRICULAR STROKE VOLUME: 36 ML
MAX DIASTOLIC BP: 77 MMHG
MAX HEART RATE: 115 BPM
MAX HR PERCENT: 68 %
MAX HR: 167 BPM
MAX PREDICTED HEART RATE: 167 BPM
MAX. SYSTOLIC BP: 158 MMHG
MCH RBC QN AUTO: 27.6 PG (ref 26.8–34.3)
MCHC RBC AUTO-ENTMCNC: 34 G/DL (ref 31.4–37.4)
MCV RBC AUTO: 81 FL (ref 82–98)
MV E'TISSUE VEL-LAT: 14 CM/S
MV E'TISSUE VEL-SEP: 11 CM/S
MV PEAK A VEL: 0.88 M/S
MV PEAK E VEL: 64 CM/S
MV STENOSIS PRESSURE HALF TIME: 0 MS
NONHDLC SERPL-MCNC: 115 MG/DL
NUC STRESS EJECTION FRACTION: 61 %
PLATELET # BLD AUTO: 478 THOUSANDS/UL (ref 149–390)
PMV BLD AUTO: 8.8 FL (ref 8.9–12.7)
POTASSIUM SERPL-SCNC: 4.5 MMOL/L (ref 3.5–5.3)
PROTOCOL NAME: NORMAL
PV PEAK GRADIENT: 6 MMHG
RA PRESSURE ESTIMATED: 8 MMHG
RATE PRESSURE PRODUCT: NORMAL
RBC # BLD AUTO: 4.34 MILLION/UL (ref 3.88–5.62)
REASON FOR TERMINATION: NORMAL
RIGHT ATRIUM AREA SYSTOLE A4C: 17.7 CM2
RIGHT VENTRICLE ID DIMENSION: 2.9 CM
RV PSP: 38 MMHG
SL CV LV EF: 55
SL CV PED ECHO LEFT VENTRICLE DIASTOLIC VOLUME (MOD BIPLANE) 2D: 72 ML
SL CV PED ECHO LEFT VENTRICLE SYSTOLIC VOLUME (MOD BIPLANE) 2D: 35 ML
SL CV REST NUCLEAR ISOTOPE DOSE: 11 MCI
SL CV STRESS NUCLEAR ISOTOPE DOSE: 33 MCI
SL CV STRESS RECOVERY BP: NORMAL MMHG
SL CV STRESS RECOVERY HR: 105 BPM
SL CV STRESS RECOVERY O2 SAT: 100 %
SODIUM SERPL-SCNC: 134 MMOL/L (ref 136–145)
STRESS ANGINA INDEX: 0
STRESS BASELINE BP: NORMAL MMHG
STRESS BASELINE HR: 93 BPM
STRESS DUKE TREADMILL SCORE: 1
STRESS O2 SAT REST: 99 %
STRESS PEAK HR: 115 BPM
STRESS PERCENT HR: 68 %
STRESS POST ESTIMATED WORKLOAD: 1 METS
STRESS POST EXERCISE DUR MIN: 1 MIN
STRESS POST O2 SAT PEAK: 99 %
STRESS POST PEAK BP: 132 MMHG
STRESS ST DEPRESSION: 0 MM
STRESS TARGET HR: 115 BPM
TARGET HR FORMULA: NORMAL
TIME IN EXERCISE PHASE: NORMAL
TR MAX PG: 30 MMHG
TRICUSPID VALVE PEAK REGURGITATION VELOCITY: 2.76 M/S
TRIGL SERPL-MCNC: 123 MG/DL
VANCOMYCIN TROUGH SERPL-MCNC: 12.1 UG/ML (ref 10–20)
WBC # BLD AUTO: 16.47 THOUSAND/UL (ref 4.31–10.16)
Z-SCORE OF LEFT VENTRICULAR DIMENSION IN END SYSTOLE: -3.87

## 2022-01-20 PROCEDURE — 78452 HT MUSCLE IMAGE SPECT MULT: CPT | Performed by: INTERNAL MEDICINE

## 2022-01-20 PROCEDURE — 85027 COMPLETE CBC AUTOMATED: CPT | Performed by: PHYSICIAN ASSISTANT

## 2022-01-20 PROCEDURE — 94762 N-INVAS EAR/PLS OXIMTRY CONT: CPT

## 2022-01-20 PROCEDURE — 93016 CV STRESS TEST SUPVJ ONLY: CPT | Performed by: INTERNAL MEDICINE

## 2022-01-20 PROCEDURE — 93017 CV STRESS TEST TRACING ONLY: CPT

## 2022-01-20 PROCEDURE — 78452 HT MUSCLE IMAGE SPECT MULT: CPT

## 2022-01-20 PROCEDURE — 82948 REAGENT STRIP/BLOOD GLUCOSE: CPT

## 2022-01-20 PROCEDURE — 80048 BASIC METABOLIC PNL TOTAL CA: CPT | Performed by: NURSE PRACTITIONER

## 2022-01-20 PROCEDURE — A9502 TC99M TETROFOSMIN: HCPCS

## 2022-01-20 PROCEDURE — 80061 LIPID PANEL: CPT | Performed by: NURSE PRACTITIONER

## 2022-01-20 PROCEDURE — 93018 CV STRESS TEST I&R ONLY: CPT | Performed by: INTERNAL MEDICINE

## 2022-01-20 PROCEDURE — 99024 POSTOP FOLLOW-UP VISIT: CPT | Performed by: SURGERY

## 2022-01-20 PROCEDURE — 99232 SBSQ HOSP IP/OBS MODERATE 35: CPT | Performed by: INTERNAL MEDICINE

## 2022-01-20 PROCEDURE — 87040 BLOOD CULTURE FOR BACTERIA: CPT | Performed by: INTERNAL MEDICINE

## 2022-01-20 PROCEDURE — 84484 ASSAY OF TROPONIN QUANT: CPT | Performed by: NURSE PRACTITIONER

## 2022-01-20 PROCEDURE — 80202 ASSAY OF VANCOMYCIN: CPT | Performed by: NURSE PRACTITIONER

## 2022-01-20 PROCEDURE — 99223 1ST HOSP IP/OBS HIGH 75: CPT | Performed by: INTERNAL MEDICINE

## 2022-01-20 PROCEDURE — G1004 CDSM NDSC: HCPCS

## 2022-01-20 PROCEDURE — 94760 N-INVAS EAR/PLS OXIMETRY 1: CPT

## 2022-01-20 RX ORDER — METOPROLOL SUCCINATE 25 MG/1
25 TABLET, EXTENDED RELEASE ORAL
Status: DISCONTINUED | OUTPATIENT
Start: 2022-01-20 | End: 2022-01-25 | Stop reason: HOSPADM

## 2022-01-20 RX ADMIN — REGADENOSON 0.4 MG: 0.08 INJECTION, SOLUTION INTRAVENOUS at 09:40

## 2022-01-20 RX ADMIN — VANCOMYCIN HYDROCHLORIDE 1500 MG: 10 INJECTION, POWDER, LYOPHILIZED, FOR SOLUTION INTRAVENOUS at 11:24

## 2022-01-20 RX ADMIN — DOCUSATE SODIUM 100 MG: 100 CAPSULE ORAL at 17:57

## 2022-01-20 RX ADMIN — OXYCODONE HYDROCHLORIDE 5 MG: 5 TABLET ORAL at 11:24

## 2022-01-20 RX ADMIN — INSULIN LISPRO 2 UNITS: 100 INJECTION, SOLUTION INTRAVENOUS; SUBCUTANEOUS at 11:24

## 2022-01-20 RX ADMIN — SENNOSIDES 8.6 MG: 8.6 TABLET, FILM COATED ORAL at 00:50

## 2022-01-20 RX ADMIN — DOCUSATE SODIUM 100 MG: 100 CAPSULE ORAL at 08:09

## 2022-01-20 RX ADMIN — POLYETHYLENE GLYCOL 3350 17 G: 17 POWDER, FOR SOLUTION ORAL at 00:50

## 2022-01-20 RX ADMIN — INSULIN LISPRO 2 UNITS: 100 INJECTION, SOLUTION INTRAVENOUS; SUBCUTANEOUS at 08:09

## 2022-01-20 RX ADMIN — PYRIDOXINE HCL TAB 50 MG 100 MG: 50 TAB at 08:09

## 2022-01-20 RX ADMIN — PIPERACILLIN AND TAZOBACTAM 3.38 G: 36; 4.5 INJECTION, POWDER, FOR SOLUTION INTRAVENOUS at 00:50

## 2022-01-20 RX ADMIN — INSULIN LISPRO 1 UNITS: 100 INJECTION, SOLUTION INTRAVENOUS; SUBCUTANEOUS at 16:21

## 2022-01-20 RX ADMIN — RIVAROXABAN 20 MG: 20 TABLET, FILM COATED ORAL at 11:30

## 2022-01-20 RX ADMIN — SODIUM CHLORIDE, SODIUM LACTATE, POTASSIUM CHLORIDE, AND CALCIUM CHLORIDE 75 ML/HR: .6; .31; .03; .02 INJECTION, SOLUTION INTRAVENOUS at 18:22

## 2022-01-20 RX ADMIN — OXYCODONE HYDROCHLORIDE 5 MG: 5 TABLET ORAL at 01:47

## 2022-01-20 RX ADMIN — PIPERACILLIN SODIUM AND TAZOBACTAM SODIUM 4.5 G: 4; .5 INJECTION, POWDER, LYOPHILIZED, FOR SOLUTION INTRAVENOUS at 21:18

## 2022-01-20 RX ADMIN — SENNOSIDES 8.6 MG: 8.6 TABLET, FILM COATED ORAL at 21:22

## 2022-01-20 RX ADMIN — PIPERACILLIN AND TAZOBACTAM 3.38 G: 36; 4.5 INJECTION, POWDER, FOR SOLUTION INTRAVENOUS at 05:55

## 2022-01-20 RX ADMIN — ATORVASTATIN CALCIUM 10 MG: 10 TABLET, FILM COATED ORAL at 08:09

## 2022-01-20 RX ADMIN — PIPERACILLIN AND TAZOBACTAM 3.38 G: 36; 4.5 INJECTION, POWDER, FOR SOLUTION INTRAVENOUS at 13:46

## 2022-01-20 RX ADMIN — EMTRICITABINE: 200 CAPSULE ORAL at 08:09

## 2022-01-20 RX ADMIN — OXYCODONE HYDROCHLORIDE 5 MG: 5 TABLET ORAL at 17:57

## 2022-01-20 RX ADMIN — VANCOMYCIN HYDROCHLORIDE 1500 MG: 10 INJECTION, POWDER, LYOPHILIZED, FOR SOLUTION INTRAVENOUS at 01:39

## 2022-01-20 RX ADMIN — METOPROLOL SUCCINATE 25 MG: 25 TABLET, EXTENDED RELEASE ORAL at 21:22

## 2022-01-20 RX ADMIN — HYDROMORPHONE HYDROCHLORIDE 0.2 MG: 1 INJECTION, SOLUTION INTRAMUSCULAR; INTRAVENOUS; SUBCUTANEOUS at 06:07

## 2022-01-20 NOTE — PROGRESS NOTES
Vancomycin Assessment    Argelia Goodrich is a 46 y o  male who is currently receiving vancomycin 1500 mg IV q12h for skin-soft tissue infection liver & abdominal abcess   Relevant clinical data and objective history reviewed:  Creatinine   Date Value Ref Range Status   01/20/2022 1 13 0 60 - 1 30 mg/dL Final     Comment:     Standardized to IDMS reference method   01/19/2022 1 11 0 60 - 1 30 mg/dL Final     Comment:     Standardized to IDMS reference method   01/11/2022 1 15 0 60 - 1 30 mg/dL Final     Comment:     Standardized to IDMS reference method   09/28/2015 1 07 0 60 - 1 30 mg/dL Final     Comment:     Standardized to IDMS reference method   09/24/2015 1 02 0 60 - 1 30 mg/dL Final     Comment:     Standardized to IDMS reference method   09/21/2015 1 07 0 60 - 1 30 mg/dL Final     Comment:     Standardized to IDMS reference method     /84   Pulse 96   Temp 99 °F (37 2 °C)   Resp 20   Ht 5' 10" (1 778 m)   Wt 81 2 kg (179 lb)   SpO2 96%   BMI 25 68 kg/m²   I/O last 3 completed shifts: In: 900 [I V :600; IV Piggyback:300]  Out: 517 [Urine:400; Drains:117]  Lab Results   Component Value Date/Time    BUN 8 01/20/2022 06:16 AM    BUN 19 09/28/2015 09:07 AM    WBC 16 47 (H) 01/20/2022 06:16 AM    WBC 4 58 09/28/2015 09:07 AM    HGB 12 0 01/20/2022 06:16 AM    HGB 11 6 (L) 09/28/2015 09:07 AM    HCT 35 3 (L) 01/20/2022 06:16 AM    HCT 36 1 (L) 09/28/2015 09:07 AM    MCV 81 (L) 01/20/2022 06:16 AM    MCV 84 09/28/2015 09:07 AM     (H) 01/20/2022 06:16 AM     09/28/2015 09:07 AM     Temp Readings from Last 3 Encounters:   01/20/22 99 °F (37 2 °C)   01/12/22 98 7 °F (37 1 °C)   01/10/22 98 1 °F (36 7 °C) (Oral)     Vancomycin Days of Therapy: 2    Assessment/Plan  The patient is currently on vancomycin utilizing scheduled dosing  Baseline risks associated with therapy include: concomitant nephrotoxic medications and advanced age    The patient is receiving 1500 mg IV q12h with the most recent vancomycin level being at steady-state and sub-therapeutic (12 1) based on a goal of 15-20 (appropriate for most indications) ; therefore, after clinical evaluation will be changed to 2000 mg IV q12h   Pharmacy will continue to follow closely for s/sx of nephrotoxicity, infusion reactions, and appropriateness of therapy  BMP and CBC will be ordered per protocol  Plan for trough as patient approaches steady state, prior to the 4th  dose at approximately 0800 on 1/22/22  Pharmacy will continue to follow the patients culture results and clinical progress daily      Maximus Ochoa, Pharmacist

## 2022-01-20 NOTE — PLAN OF CARE
Problem: GASTROINTESTINAL - ADULT  Goal: Maintains or returns to baseline bowel function  Description: INTERVENTIONS:  - Assess bowel function  - Encourage oral fluids to ensure adequate hydration  - Administer IV fluids if ordered to ensure adequate hydration  - Administer ordered medications as needed  - Encourage mobilization and activity  - Consider nutritional services referral to assist patient with adequate nutrition and appropriate food choices  Outcome: Progressing     Problem: PAIN - ADULT  Goal: Verbalizes/displays adequate comfort level or baseline comfort level  Description: Interventions:  - Encourage patient to monitor pain and request assistance  - Assess pain using appropriate pain scale  - Administer analgesics based on type and severity of pain and evaluate response  - Implement non-pharmacological measures as appropriate and evaluate response  - Consider cultural and social influences on pain and pain management  - Notify physician/advanced practitioner if interventions unsuccessful or patient reports new pain  Outcome: Progressing     Problem: INFECTION - ADULT  Goal: Absence or prevention of progression during hospitalization  Description: INTERVENTIONS:  - Assess and monitor for signs and symptoms of infection  - Monitor lab/diagnostic results  - Monitor endotracheal if appropriate and nasal secretions for changes in amount and color  - [place] appropriate cooling/warming therapies per order  - Administer medications as ordered  - Instruct and encourage patient and family to use good hand hygiene technique  - Identify and instruct in appropriate isolation precautions for identified infection/condition  Outcome: Progressing

## 2022-01-20 NOTE — CONSULTS
[facility] 1970, 46 y o  male MRN: [id number]  Unit/Bed#: [address] [place] [address] Encounter: [id number]  Primary Care Provider: No primary care provider on file  Date and time admitted to hospital: 1/18/2022 11:40 PM    Inpatient consult to Internal Medicine  Consult performed by: JONELLE Tavera  Consult ordered by: Paul Garcia MD          * Sepsis [facility])  Assessment & Plan  As evidenced by tachycardia, leukocytosis  Secondary to liver abscess, fluid collection in the right paracolic gutter  Post drain placement by IR today  · Continue zosyn and vancomycin  · Check lactic   · Send procalcitonin and trend  · Blood and fluid cultures pending    Chest pain  Assessment & Plan  Patient reported chest pain today  EKG with new t wave inversions  Cardiology consult appreciated  Plan is for echo/stress in AM    Hyperlipidemia  Assessment & Plan  Continue statin    Other constipation  Assessment & Plan  Received miralax  Start on bowel regimen with colace and senna  Enema     Intra-abdominal abscess post-procedure  Assessment & Plan  Post BEL drain  Continue ABT  Plan per surgery    Liver abscess  Assessment & Plan  Post BEL drain  Continue ABT  Plan per surgery    Acute perforated appendicitis  Assessment & Plan  Post laparoscopic appendectomy 1/11/22    Protein S deficiency ([address] )  Assessment & Plan  Continue xarelto    HIV exposure  Assessment & Plan  On truvada prophylaxis per ID notes, recent HIV test negative     Type 2 diabetes mellitus without complication, without long-term current use of insulin [facility])  Assessment & Plan  Lab Results   Component Value Date    HGBA1C 7 1 (H) 11/16/2021       Recent Labs     01/19/22  1328 01/19/22  1620 01/19/22  2045   POCGLU 174* 191* 175*       Blood Sugar Average: Last 72 hrs:  (P) 180   Accuchecks AC/HS with insulin sliding scale           PCP: No primary care provider on file    Date of Admission:  1/18/2022  Date of Consultation: 01/19/22  Requesting Physician: Violette Hsu MD    Reason For Consultation:     Medical management  of Present Illness:    Morales Davis is a 46 y o  male with a PMH of hyperlipidemia, protein S deficiency, diabetes who is originally admitted to the surgical service on 1/18/2022 due to abscess  We are consulted for management  Patient had laparoscopic appendectomy on January 11th  He presented to [facility] yesterday with complaints of abdominal and flank pain  He was found to have a small liver abscess and a collection of fluid in the right paracolic gutter  Patient was transferred to [facility] for IR in for intervention  He had 2 drains placed today  After the procedure he complained of some chest pain and had T-wave inversions on his EKG prompting cardiology consultation  Plan is for a echo and stress test in the morning  Patient also complains of constipation since surgery  Review of Systems:    Review of Systems   Constitutional: Negative for chills and fever  HENT: Negative for ear pain and sore throat  Eyes: Negative for pain and visual disturbance  Respiratory: Negative for cough and shortness of breath  Cardiovascular: Negative for chest pain and palpitations  Gastrointestinal: Positive for abdominal pain and constipation  Negative for vomiting  Genitourinary: Negative for dysuria and hematuria  Musculoskeletal: Negative for arthralgias and back pain  Skin: Negative for color change and rash  Neurological: Negative for seizures and syncope  All other systems reviewed and are negative        Past Medical and Surgical History:     Past Medical History:   Diagnosis Date    Anemia     Anxiety     Arthritis     Asthma     Blood clot in vein 2017    right leg    Blood disorder     Chronic pain disorder     good control    Depression     Diabetes mellitus ([address] )     type 2    Enlarged prostate     Epilepsy ([facility] [address] )     past, last was 1 year ago   [facility] Hypertension     Protein S deficiency ([facility] [address] )     on xarelto    Psychiatric disorder     bipolar    Psychiatric illness     Psychosis ([facility] [address] )     Sleep apnea     snores but hasn't been tested    Vertigo        Past Surgical History:   Procedure Laterality Date    APPENDECTOMY LAPAROSCOPIC N/A 1/11/2022    Procedure: APPENDECTOMY LAPAROSCOPIC;  Surgeon: Emelina Obrien MD;  Location: [address] [place] [address];  Service: General    CYST REMOVAL      ESOPHAGOGASTRODUODENOSCOPY N/A 11/9/2016    Procedure: ESOPHAGOGASTRODUODENOSCOPY (EGD); Surgeon: Donnie Sage MD;  Location:  GI LAB; Service:     ESOPHAGOGASTRODUODENOSCOPY N/A 9/2/2016    Procedure: ESOPHAGOGASTRODUODENOSCOPY (EGD); Surgeon: Donnie Sage MD;  Location:  GI LAB; Service:     HERNIA REPAIR      IR DRAINAGE TUBE PLACEMENT  1/19/2022       Meds/Allergies:    PTA meds:   Prior to Admission Medications   Prescriptions Last Dose Informant Patient Reported? Taking?    ACCU-CHEK FASTCLIX LANCETS MISC 1/17/2022 at Unknown time  No Yes   Sig: by Does not apply route daily   ACCU-CHEK GUIDE test strip   Yes No   Blood Glucose Monitoring Suppl (ACCU-CHEK GUIDE) w/Device KIT 1/17/2022 at Unknown time  No Yes   Sig: by Does not apply route daily   albuterol (ProAir HFA) 90 mcg/act inhaler Past Week at Unknown time  No Yes   Sig: Inhale 2 puffs every 4 (four) hours as needed for wheezing or shortness of breath   atorvastatin (LIPITOR) 10 mg tablet 1/17/2022 at Unknown time  No Yes   Sig: Take 1 tablet (10 mg total) by mouth daily   emtricitabine-tenofovir (Truvada) 200-300 mg per tablet 1/17/2022 at Unknown time  No Yes   Sig: Take 1 tablet by mouth every 24 hours   isoniazid (NYDRAZID) 100 mg tablet Not Taking at Unknown time Self Yes No   Sig: Take 100 mg by mouth daily   Patient not taking: Reported on 1/18/2022    metFORMIN (GLUCOPHAGE) 1000 MG tablet 1/17/2022 at Unknown time  No Yes   Sig: Take 1 tablet (1,000 mg total) by mouth 2 (two) times a day with meals oxyCODONE (ROXICODONE) 5 immediate release tablet   No No   Sig: Take 1 tablet (5 mg total) by mouth every 4 (four) hours as needed for severe pain for up to 5 days Max Daily Amount: 30 mg   pyridoxine (VITAMIN B6) 100 mg tablet 1/17/2022 at Unknown time Self Yes Yes   Sig: Take 100 mg by mouth daily     rivaroxaban (Xarelto) 20 mg tablet Not Taking at Unknown time  No No   Sig: Take 1 tablet (20 mg total) by mouth every 24 hours   Patient not taking: Reported on 1/18/2022       Facility-Administered Medications: None       Allergies:    Allergies   Allergen Reactions    Fish-Derived Products - Food Allergy      Cod    Nuts - Food Allergy      Walnuts      Pollen Extract     Warfarin      Panic attacks    Watermelon [Citrullus Vulgaris]      History:     Marital Status:     Substance Use History:   Social History     Substance and Sexual Activity   Alcohol Use Yes    Comment: occassional     Social History     Tobacco Use   Smoking Status Current Some Day Smoker    Packs/day: 0 25    Years: 15 00    Pack years: 3 75    Types: Cigarettes   Smokeless Tobacco Current User   Tobacco Comment    pack a month     Social History     Substance and Sexual Activity   Drug Use Yes    Types: Marijuana       Family History:    Family History   Problem Relation Age of Onset    No Known Problems Mother     Colon cancer Father 61    No Known Problems Sister     No Known Problems Brother     No Known Problems Maternal Aunt     No Known Problems Paternal Aunt     No Known Problems Maternal Uncle     No Known Problems Paternal Uncle     No Known Problems Maternal Grandfather     No Known Problems Maternal Grandmother     No Known Problems Paternal Grandfather     Hypertension Paternal Grandmother     No Known Problems Cousin     Prostate cancer Family     Stroke Family     Diabetes Family         MELLITUS    Coronary artery disease Family        Physical Exam:     Vitals:   Blood Pressure: 140/83 (01/19/22 1900)  Pulse: 96 (01/19/22 1900)  Temperature: (!) 100 7 °F (38 2 °C) (01/19/22 1900)  Temp Source: Oral (01/19/22 1900)  Respirations: 18 (01/19/22 1900)  Height: 5' 10" (177 8 cm) (01/19/22 1444)  Weight - Scale: 81 2 kg (179 lb) (01/19/22 1444)  SpO2: 96 % (01/19/22 1900)    Physical Exam  Vitals and nursing note reviewed  Constitutional:       Appearance: Normal appearance  HENT:      Head: Normocephalic  Nose: Nose normal    Eyes:      Extraocular Movements: Extraocular movements intact  Cardiovascular:      Rate and Rhythm: Normal rate and regular rhythm  Pulses: Normal pulses  Heart sounds: Normal heart sounds  No murmur heard  Pulmonary:      Effort: Pulmonary effort is normal       Breath sounds: Normal breath sounds  Abdominal:      General: Abdomen is flat  Bowel sounds are normal  There is distension (mild)  Tenderness: There is abdominal tenderness (mild)  Musculoskeletal:         General: No swelling  Normal range of motion  Skin:     General: Skin is warm and dry  Neurological:      General: No focal deficit present  Mental Status: He is alert and oriented to person, place, and time  Psychiatric:         Mood and Affect: Mood normal          Behavior: Behavior normal            Lab Results:  I Have Reviewed All Lab Data Below:    Results from last 7 days   Lab Units 01/19/22  1326   WBC Thousand/uL 12 10*   HEMOGLOBIN g/dL 11 9*   PLATELETS Thousands/uL 447*     Results from last 7 days   Lab Units 01/19/22  1326   SODIUM mmol/L 138   POTASSIUM mmol/L 3 5   CHLORIDE mmol/L 98*   CO2 mmol/L 29   BUN mg/dL 10   CREATININE mg/dL 1 11   CALCIUM mg/dL 8 9             Lab Results   Component Value Date/Time    HGBA1C 7 1 (H) 11/16/2021 12:50 PM     Results from last 7 days   Lab Units 01/19/22  2045 01/19/22  1620 01/19/22  1328   POC GLUCOSE mg/dl 175* 191* 174*           Blood Culture: No results found for: BLOODCX  Urine Culture:   Lab Results Component Value Date    URINECX (A) 05/25/2021     10,000-19,000 cfu/ml Alpha Hemolytic Streptococcus NOT Enterococcus    URINECX 20,000-29,000 cfu/ml  12/22/2020    URINECX 9522-2003 cfu/ml Gram Positive Cocci (A) 06/06/2019     Sputum Culture: No components found for: SPUTUMCX  Wound Culture: No results found for: [facility]        * Additional Pertinent Lab Tests Reviewed: Denisa [address] Admission Reviewed    Imaging: I have personally reviewed pertinent reports  XR chest portable   Final Result by Dean Faustin MD (01/19 8459)      No acute cardiopulmonary disease  Workstation performed: [id number]         IR drainage tube placement   Final Result by Wiliam Draper MD (01/19 6162)      1  Right paracolic abscess drain placement using 10 [place] Island[place] Wade-Aquino catheter  2  Right posterior inferior liver abscess drain placement using 10 [place] all-purpose drainage catheter  Plan:       - Catheters to bulb suction    - Flush catheter was with 10 mL normal saline daily    - Return in 2 weeks for drain check or when outputs are less than 10 mL per day for 2 consecutive days  Workstation performed: [id number]         IR drainage tube check/change/reposition/reinsertion/upsize    (Results Pending)         VTE Prophylaxis: xarelto  / sequential compression device       Counseling / Coordination of Care Time: 15 minutes  Greater than 50% of total time spent on patient counseling and coordination of care  Collaboration of Care:  Were Recommendations Directly Discussed with Primary Treatment Team? - Yes     ** Please Note: Dragon 360 Dictation speech to text software may have been used in the creation of this document **

## 2022-01-20 NOTE — UTILIZATION REVIEW
Inpatient Admission Authorization Request   NOTIFICATION OF INPATIENT ADMISSION/INPATIENT AUTHORIZATION REQUEST   SERVICING FACILITY:   [place] [address]   [address] [place], [place] [address] [address]  Tax ID: [id number]  NPI: [id number]  Place of Service: Inpatient [address] [facility] Code: 24     ATTENDING PROVIDER:  Attending Name and NPI#: SamariaNu Arvizu [[id number]]  Address: [address] [place] [address] [place] [address]  Phone: [phone]     UTILIZATION REVIEW CONTACT:  Sherry Brice Utilization   Network Utilization Review Department  Phone: [phone]  Fax [phone]  Email: [email or web address]     PHYSICIAN ADVISORY SERVICES:  FOR WQPY-VT-URKS REVIEW - MEDICAL NECESSITY DENIAL  Phone: [phone]  Fax: [phone]  Email: [email or web address]     TYPE OF REQUEST:  Inpatient Status     ADMISSION INFORMATION:  ADMISSION DATE/TIME: 1/18/22 11:40 PM  PATIENT DIAGNOSIS CODE/DESCRIPTION:  Post-operative complication [T61  9XXA]  DISCHARGE DATE/TIME: No discharge date for patient encounter  DISCHARGE DISPOSITION (IF DISCHARGED): Home/Self Care     IMPORTANT INFORMATION:  Please contact the [facility] directly with any questions or concerns regarding this request  Department voicemails are confidential     Send requests for admission clinical reviews, concurrent reviews, approvals, and administrative denials due to lack of clinical to fax [phone]

## 2022-01-20 NOTE — ASSESSMENT & PLAN NOTE
As evidenced by tachycardia, leukocytosis  Secondary to liver abscess, fluid collection in the right paracolic gutter    Post drain placement by IR on January 19, 2022    · Patient currently on IV Zosyn and vancomycin  · Lactic acid level was normal  · Body fluid cultures growing gram-negative rods, Gram-positive rods, Gram-positive cocci in pairs and chains and non lactose fermenting gram-negative hector  · Blood cultures were ordered which are pending  · Patient's white count has increased to 16,000 from 12,000  · Monitor fever curve and white count

## 2022-01-20 NOTE — ASSESSMENT & PLAN NOTE
Patient reported chest pain today  EKG with new t wave inversions  Cardiology consult appreciated  Plan is for echo/stress in AM

## 2022-01-20 NOTE — PROGRESS NOTES
Progress Note - General Surgery   Mariana Means 46 y o  male MRN: [id number]  Unit/Bed#: [address] [place] [address] Encounter: [id number]    Assessment:  · Intra-abdominal and heptaic abscess s/p IR Ct guided percutaneous drainage 01/19/2022  · Richard drain output: RUQ lateral: 72 mL, RLQ: 45 mL  serosang  · WBC: 16 47 (12 10)  · S/p laparoscopic appendectomy 1/11/22  · IDDM: HgbA1c: 7 1  · Protein S deficiency:  · Chest pain: Tropin: 3>4>5, CXR: no acute cardio/pulmonary disease, ECG showed T wave inversion  · Constipation: with large BM x2 last evening  Plan:  Continue IV antibiotics: vancomycin and Zosyn  Cultures pending  PRN analgesics  Cardiology and Internal medicine input appreciated  Plan for nuclear stress test today per cardiology  OOB and ambulate  Tight glucose control  Continue Xeralto  Subjective/Objective   Chief Complaint:"I had a bowel movement  My right is very painful "    Subjective: Paitnet was seen and examined bedside  Patient reports having two large bowel movements after drink prune juice  Denies any nausea, vomiting, chest pain or shortness of breath currently  Patient reports pain in right side  Pain is made better with lying on left side  Objective:2 richard drains intact with serosang output  Blood pressure 136/84, pulse 96, temperature 99 °F (37 2 °C), resp  rate 20, height 5' 10" (1 778 m), weight 81 2 kg (179 lb), SpO2 96 %  ,Body mass index is 25 68 kg/m²        Intake/Output Summary (Last 24 hours) at 1/20/2022 0943  Last data filed at 1/20/2022 0555  Gross per 24 hour   Intake --   Output 367 ml   Net -367 ml       Invasive Devices  Report    Peripheral Intravenous Line            Peripheral IV 01/19/22 Right Antecubital 1 day          Drain            Closed/Suction Drain Lateral RUQ Bulb 10 Fr  <1 day    Closed/Suction Drain Right RLQ Bulb 10 Fr  <1 day                Physical Exam: /84   Pulse 96   Temp 99 °F (37 2 °C)   Resp 20   Ht 5' 10" (1 778 m)   Wt 81 2 kg (179 lb)   SpO2 96%   BMI 25 68 kg/m²   General appearance: alert and oriented, in no acute distress  Head: Normocephalic, without obvious abnormality, atraumatic  Lungs: clear to auscultation bilaterally  Heart: regular rate and rhythm, S1, S2 normal, no murmur, click, rub or gallop  Abdomen: mildly distened, + BS, generalzied tenderness RUQ,   Skin: Skin color, texture, turgor normal  No rashes or lesions    Lab, Imaging and other studies:  I have personally reviewed pertinent lab results  , CBC:   Lab Results   Component Value Date    WBC 16 47 (H) 01/20/2022    HGB 12 0 01/20/2022    HCT 35 3 (L) 01/20/2022    MCV 81 (L) 01/20/2022     (H) 01/20/2022    MCH 27 6 01/20/2022    MCHC 34 0 01/20/2022    RDW 13 3 01/20/2022    MPV 8 8 (L) 01/20/2022    NRBC 0 01/19/2022   , CMP:   Lab Results   Component Value Date    SODIUM 134 (L) 01/20/2022    K 4 5 01/20/2022    CL 97 (L) 01/20/2022    CO2 26 01/20/2022    BUN 8 01/20/2022    CREATININE 1 13 01/20/2022    CALCIUM 9 2 01/20/2022    EGFR 73 01/20/2022     VTE Pharmacologic Prophylaxis: Xeralto    VTE Mechanical Prophylaxis: sequential compression device

## 2022-01-20 NOTE — ASSESSMENT & PLAN NOTE
As evidenced by tachycardia, leukocytosis  Secondary to liver abscess, fluid collection in the right paracolic gutter  Post drain placement by IR today      · Continue zosyn and vancomycin  · Check lactic   · Send procalcitonin and trend  · Blood and fluid cultures pending

## 2022-01-20 NOTE — ASSESSMENT & PLAN NOTE
Received miralax  Patient started on Colace and senna and also received enema  Patient had 2 large bowel movements yesterday evening

## 2022-01-20 NOTE — ASSESSMENT & PLAN NOTE
Lab Results   Component Value Date    HGBA1C 7 1 (H) 11/16/2021       Recent Labs     01/19/22  1328 01/19/22  1620 01/19/22 2045   POCGLU 174* 191* 175*       Blood Sugar Average: Last 72 hrs:  (P) 180   Accuchecks AC/HS with insulin sliding scale

## 2022-01-20 NOTE — PROGRESS NOTES
Progress Note - Cardiology   [facility]     Fredrick Freeman 46 y o  male MRN: [id number]  : 1970  Unit/Bed#: 2 [place] [address] Encounter: [id number]    Assessment and Plan:   1  Chest pain in patient with cardiac comorbidities of diabetes:  High sensitivity troponin negative    -  continue beta-blocker with Toprol XL 25 mg at bedtime    -  continue statin therapy    -  for nuclear stress test today to evaluate for ischemia    2  Abdominal abscess status post laparoscopic appendectomy:  Drains placed by IR on 2022    3  Protein C and S deficiency:  Continue Xarelto as he was taking in the outpatient setting    4  Diabetes: hemoglobin A1c of 8 2  Managed per primary team    5  Constipation    Subjective / Objective:   Patient seen and examined  He underwent stress test today to evaluate for ischemia due to chest pain  He denies any further episodes of chest discomfort  Vitals: Blood pressure 136/84, pulse 96, temperature 99 °F (37 2 °C), resp  rate 20, height 5' 10" (1 778 m), weight 81 2 kg (179 lb), SpO2 96 %  Vitals:    22 0256 22 1444   Weight: 81 6 kg (179 lb 14 3 oz) 81 2 kg (179 lb)     Body mass index is 25 68 kg/m²    BP Readings from Last 3 Encounters:   22 136/84   22 128/81   01/10/22 115/74     Orthostatic Blood Pressures      Most Recent Value   Blood Pressure 136/84 filed at 2022 7486   Patient Position - Orthostatic VS Lying filed at 2022 1900        I/O        0701   0700  0701   0700  0701   0700    I V  (mL/kg) 600 (7 4)      IV Piggyback 300      Total Intake(mL/kg) 900 (11)      Urine (mL/kg/hr) 150 250 (0 1)     Drains  117     Stool  0     Total Output 150 367     Net +750 -367            Unmeasured Urine Occurrence 1 x      Unmeasured Stool Occurrence  2 x         Invasive Devices  Report    Peripheral Intravenous Line            Peripheral IV 22 Right Antecubital 1 day          Drain Closed/Suction Drain Lateral RUQ Bulb 10 Fr  1 day    Closed/Suction Drain Right RLQ Bulb 10 Fr  1 day                  Intake/Output Summary (Last 24 hours) at 1/20/2022 1158  Last data filed at 1/20/2022 0555  Gross per 24 hour   Intake --   Output 345 ml   Net -345 ml         Physical Exam:   Physical Exam  Vitals and nursing note reviewed  Constitutional:       General: He is not in acute distress  Appearance: Normal appearance  He is well-developed and normal weight  HENT:      Head: Normocephalic  Right Ear: External ear normal       Left Ear: External ear normal    Eyes:      General: No scleral icterus  Right eye: No discharge  Left eye: No discharge  Neck:      Thyroid: No thyromegaly  Cardiovascular:      Rate and Rhythm: Normal rate and regular rhythm  Pulses: Normal pulses  Pulmonary:      Effort: Pulmonary effort is normal  No respiratory distress  Breath sounds: Normal breath sounds  No wheezing, rhonchi or rales  Abdominal:      General: Bowel sounds are normal  There is no distension  Palpations: Abdomen is soft  Musculoskeletal:      Cervical back: Normal range of motion and neck supple  Right lower leg: No edema  Left lower leg: No edema  Skin:     General: Skin is warm and dry  Capillary Refill: Capillary refill takes less than 2 seconds  Neurological:      General: No focal deficit present  Mental Status: He is alert and oriented to person, place, and time  Mental status is at baseline     Psychiatric:         Mood and Affect: Mood normal                    Medications/ Allergies:     Current Facility-Administered Medications   Medication Dose Route Frequency Provider Last Rate    acetaminophen  650 mg Oral Q6H PRN JONELLE Ceja      albuterol  2 puff Inhalation Q4H PRN Megan Wang PA-C      atorvastatin  10 mg Oral Daily Megan Wang PA-C      docusate sodium  100 mg Oral BID [place] RENDON JONELLE Rutherford      emtricitabine-tenofovir (TRUVADA 200-300) combo dose   Oral Daily Christiano Magdiel, [place]      HYDROmorphone  0 2 mg Intravenous Q4H PRN Christiano Magdiel, PA-C      insulin lispro  1-5 Units Subcutaneous TID AC Christiano Magdiel, PA-C      lactated ringers  75 mL/hr Intravenous Continuous Christiano Magdiel, PA-C 75 mL/hr (01/20/22 0555)    metoprolol tartrate  25 mg Oral Q12H [address] JONELLE Vidal      ondansetron  4 mg Intravenous Q6H PRN Christiano Magdiel, PA-C      oxyCODONE  5 mg Oral Q4H PRN JONELLE Chen      piperacillin-tazobactam  3 375 g Intravenous Q6H Colleen Vaughan MD Stopped (01/20/22 0701)    pyridoxine  100 mg Oral Daily Christiano Magdiel, PA-C      rivaroxaban  20 mg Oral Q24H Christiano Magdiel, PA-C      senna  1 tablet Oral HS JONELLE Chen      vancomycin  1,500 mg Intravenous Q12H Chaya PassJONELLE marino       acetaminophen, 650 mg, Q6H PRN  albuterol, 2 puff, Q4H PRN  HYDROmorphone, 0 2 mg, Q4H PRN  ondansetron, 4 mg, Q6H PRN  oxyCODONE, 5 mg, Q4H PRN      Allergies   Allergen Reactions    Fish-Derived Products - Food Allergy      Cod    Nuts - Food Allergy      Walnuts      Pollen Extract     Warfarin      Panic attacks    Watermelon [Citrullus Vulgaris]        VTE Pharmacologic Prophylaxis:   Sequential compression device (Venodyne)     Labs:   Troponins:  Results from last 7 days   Lab Units 01/20/22  0104 01/19/22  1326   HS TNI RAND ng/L  --  3*   HSTNI D2 ng/L 1  --        Troponin: @BRIEFLAB( HSTNI:3, HSTNID2, HSTNID4,[id number])    CBC with diff:  Results from last 7 days   Lab Units 01/20/22  0616 01/19/22  1326   WBC Thousand/uL 16 47* 12 10*   HEMOGLOBIN g/dL 12 0 11 9*   HEMATOCRIT % 35 3* 36 4*   MCV fL 81* 83   PLATELETS Thousands/uL 478* 447*   MCH pg 27 6 27 2   MCHC g/dL 34 0 32 7   RDW % 13 3 13 5   MPV fL 8 8* 8 8*   NRBC AUTO /100 WBCs  --  0       CMP:  Results from last 7 days   Lab Units 01/20/22  0616 01/19/22  1326 SODIUM mmol/L 134* 138   POTASSIUM mmol/L 4 5 3 5   CHLORIDE mmol/L 97* 98*   CO2 mmol/L 26 29   ANION GAP mmol/L 11 11   BUN mg/dL 8 10   CREATININE mg/dL 1 13 1 11   CALCIUM mg/dL 9 2 8 9   EGFR ml/min/1 73sq m 73 75       Magnesium:  Results from last 7 days   Lab Units 01/19/22  1326   MAGNESIUM mg/dL 1 9     Coags:    TSH:    No components found for: TSH3  Lipid Profile:  Results from last 7 days   Lab Units 01/20/22  0616   CHOLESTEROL mg/dL 126   TRIGLYCERIDES mg/dL 123   HDL mg/dL 11*   LDL CALC mg/dL 90     Hgb A1c:    NT-proBNP: No results for input(s): NTBNP in the last 72 hours  Imaging & Testing   I have personally reviewed pertinent reports  XR chest portable    Result Date: 1/19/2022  Narrative: CHEST INDICATION:   shortness of breath  COMPARISON:  Two-view chest 8/24/2021  EXAM PERFORMED/VIEWS:  XR CHEST PORTABLE FINDINGS: Cardiomediastinal silhouette appears unremarkable  Shallow inspiration and right hemidiaphragm elevation with crowding of the pulmonary markings and mild right basilar atelectasis  No effusion or pneumothorax  Osseous structures appear within normal limits for patient age  Impression: No acute cardiopulmonary disease  Workstation performed: [id number]       IR drainage tube placement    Result Date: 1/19/2022  Narrative: PROCEDURE: 1  Right paracolic abscess drain placement 2  Liver abscess drain placement Procedural Personnel Attending physician(s): Dr Talbot Kocher Pre-procedure diagnosis: Liver abscess Post-procedure diagnosis: Same Indication: Patient with recent appendectomy for appendicitis presented to outside hospital with abdominal pain was found to have right paracolic abscess as well as a liver abscess  Outside hospital imaging is not available   PROCEDURE SUMMARY: - Right paracolic abscess drainage catheter placement under CT guidance - Right posterior inferior liver abscess drainage catheter placement under CT guidance PROCEDURE DETAILS: Pre-procedure Consent: Informed consent for the procedure including risks, benefits and alternatives was obtained and time-out was performed prior to the procedure  Preparation: The site was prepared and draped using maximal sterile barrier technique including cutaneous antisepsis  Anesthesia/sedation Level of anesthesia/sedation: Moderate sedation (conscious sedation) Anesthesia/sedation administered by: IR nurse under attending supervision with continuous monitoring of the patient's level of consciousness and physiologic status Total intra-service sedation time (minutes): 30 Right paracolic abscess drainage catheter placement The patient was positioned supine  Initial imaging was performed  Local anesthesia was administered  An 18-gauge Chiba needle was advanced into the right paracolic abscess cavity  Amplatz wire was advanced through the needle to maintain tract  Tract was dilated and a 10 Western Radha Wade-Aquino catheter advanced into the abscess cavity  10 mL of dark bloody purulent fluid was aspirated and sent to lab for cultures and Gram stain  Catheter was secured to skin using 2-0 Prolene suture and connected to bulb suction drainage  Right posterior inferior liver abscess drainage catheter placement The patient was positioned left lateral decubitus  Initial imaging was performed  Local anesthesia was administered  18-gauge Chiba needle was advanced into the right inferior liver abscess cavity  Amplatz wire was advanced through the needle to maintain  tract  Tract was dilated and a 10 Western Radha all-purpose drainage catheter advanced into the liver abscess cavity  10 mL of greenish purulent fluid was aspirated and sent to lab for cultures and Gram stain  Catheter was secured to skin using 2-0 Prolene suture and connected to bulb suction drainage   Radiation Dose CT dose length product (mGy-cm): 1384 46 Additional Details Specimens removed: 10 mL dark bloody purulent fluid aspirated from right paracolic abscess and 10 mL of greenish purulent fluid aspirated from right posterior inferior liver abscess  Estimated blood loss (mL): 1 Complications: No immediate complications  Impression: 1  Right paracolic abscess drain placement using 10 Colombian Wade-Aquino catheter  2  Right posterior inferior liver abscess drain placement using 10 Colombian all-purpose drainage catheter  Plan: - Catheters to bulb suction  - Flush catheter was with 10 mL normal saline daily  - Return in 2 weeks for drain check or when outputs are less than 10 mL per day for 2 consecutive days  Workstation performed: [id number]     Echo complete w/ contrast if indicated    Result Date: 1/20/2022  Narrative: Mild concentric LVH with normal LV chamber size  Normal systolic function with estimated ejection fraction of 60% with normal segmental wall motion  Grade 1 diastolic dysfunction  Grossly normal valve function with no hemodynamically significant stenosis or regurgitation seen  Normal pulmonary artery systolic pressure  NM Myocardial Perfusion Spect (Pharmacological Induced Stress and/or Rest)    Result Date: 1/20/2022  Narrative: [place]  Stress ECG: The stress ECG is negative for ischemia after pharmacologic stress  No ST deviation is noted    Perfusion: There are no perfusion defects    Stress Function: Left ventricular function post-stress is normal  Post-stress ejection fraction is 61 %    Stress Combined Conclusion: The ECG and SPECT imaging portions of the stress study are concordant with no evidence of stress induced myocardial ischemia  Results for orders placed during the hospital encounter of 01/18/22    NM Myocardial Perfusion Spect (Pharmacological Induced Stress and/or Rest)    Interpretation Summary    Stress ECG: The stress ECG is negative for ischemia after pharmacologic stress  No ST deviation is noted    Perfusion: There are no perfusion defects      Stress Function: Left ventricular function post-stress is normal  Post-stress ejection fraction is 61 %    Stress Combined Conclusion: The ECG and SPECT imaging portions of the stress study are concordant with no evidence of stress induced myocardial ischemia  Sergio Colón, [facility]  Cardiology      "This note has been constructed using a voice recognition system  Therefore there may be syntax, spelling, and/or grammatical errors   Please call if you have any questions  "

## 2022-01-20 NOTE — CONSULTS
Consultation - Infectious Disease   Eran Villegas 46 y o  male MRN: [id number]  Unit/Bed#: 2 [place] [address] Encounter: [id number]      IMPRESSION & RECOMMENDATIONS:   1  Sepsis-POA within 24 hours of admission  Fever and leukocytosis  Appears to be secondary to intra-abdominal and hepatic abscess formation after recent appendectomy  Consideration for the possibility of bacteremia  Thus far the blood cultures are negative  The Gram stain suggest a polymicrobial infection as expected  -continue Zosyn but will increase the dose to 4 5 g IV q 6 hours to cover Pseudomonas aeruginosa  -discontinue vancomycin  -follow up cultures and adjust antibiotics as needed  -recheck CBC with diff and CMP  -supportive care    2  Hepatic abscess-in the setting of recent appendicitis status post appendectomy  The culture appears polymicrobial as expected  He has undergone percutaneous drainage with a drain remaining in place   -antibiotics as above  -follow up ID and sensitivity and adjust antibiotics as needed  -drain management  -serial abdominal exams  -close surgical follow-up    3  Intra-abdominal/pericolic abscess-in the setting of recent appendicitis status post appendectomy  Patient is status post percutaneous drainage  As expected the culture appears polymicrobial   -antibiotics as above  -follow up sensitivities and adjust antibiotics as needed  -drain management  -close surgical follow-up    4  Syphilis-recurrent and recently diagnosed  -while the piperacillin tazobactam may have activity in this situation will hold on specific directed therapy for now  -patient will need treatment with intramuscular benzathine penicillin as an outpatient and will follow up with the [address] [place] [address] Drive    5  High risk sexual activity-the patient is on pre exposure prophylaxis with Truvada  He is clearly not using safe sex practices with recently having recurrent bouts of syphilis    -will hold on pre exposure prophylaxis with Truvada until the patient is an outpatient  -check HIV screen  -treatment of syphilis as above      Have discussed the above management plan in detail with the primary service    Extensive review of the medical records in epic including review of the notes, radiographs, and laboratory results     HISTORY OF PRESENT ILLNESS:  Reason for Consult:  Intra-abdominal and liver abscess  HPI: Lillie Gee is a 46y o  year old male with bipolar disorder, schizoaffective disorder, and recent admission for appendicitis who we are asked to assist with management of a intra-abdominal and liver abscess  The patient had been admitted to [facility] recently with right lower quadrant pain and was found to have appendicitis and underwent laparoscopic appendectomy on 1/11/2022  He received perioperative antibiotics with Zosyn during his hospital stay and was eventually discharged home off all antibiotics  After being discharged the patient began developing increasing pain on the right side as well as chills  He therefore presented to [facility] and underwent CT the abdomen pelvis that revealed evidence of a pericolic abscess and a liver abscess  He was transferred back to [address] [place] [address] for further management  He underwent percutaneous drainage of the pericolic abscess as well as liver abscess  He had blood cultures obtained and has been started on vancomycin and Zosyn  During his brief hospital stay he has remained afebrile  He does continue to have right-sided pain  He is having bowel movements when previously he had been constipated  He denies any vomiting, denies any dysuria or hematuria, denies any cough or shortness of breath  Of note the patient is sexually active with both men and women and has repeated diagnosis of syphilis in the past   He was recently diagnosed with syphilis and [facility]    Because of his ongoing sexual activity he is on pre exposure prophylaxis with Truvada    REVIEW OF SYSTEMS:  A complete review of systems is negative other than that noted in the HPI  PAST MEDICAL HISTORY:  Past Medical History:   Diagnosis Date    Anemia     Anxiety     Arthritis     Asthma     Blood clot in vein 2017    right leg    Blood disorder     Chronic pain disorder     good control    Depression     Diabetes mellitus ([facility] [address] )     type 2    Enlarged prostate     [facility]ile[facility]     past, last was 1 year ago    Hypertension     Protein S deficiency ([facility] )     on xarelto    Psychiatric disorder     bipolar    Psychiatric illness     Psychosis ([facility] )     Sleep apnea     snores but hasn't been tested    Vertigo      Past Surgical History:   Procedure Laterality Date    APPENDECTOMY LAPAROSCOPIC N/A 1/11/2022    Procedure: APPENDECTOMY LAPAROSCOPIC;  Surgeon: Odalys August MD;  Location: [facility];  Service: General    CYST REMOVAL      ESOPHAGOGASTRODUODENOSCOPY N/A 11/9/2016    Procedure: ESOPHAGOGASTRODUODENOSCOPY (EGD); Surgeon: Saturnino Coreas MD;  Location:  GI LAB; Service:     ESOPHAGOGASTRODUODENOSCOPY N/A 9/2/2016    Procedure: ESOPHAGOGASTRODUODENOSCOPY (EGD); Surgeon: Saturnino Coreas MD;  Location:  GI LAB;   Service:     HERNIA REPAIR      IR DRAINAGE TUBE PLACEMENT  1/19/2022       FAMILY HISTORY:  Non-contributory    SOCIAL HISTORY:  Social History   Social History     Substance and Sexual Activity   Alcohol Use Yes    Comment: occassional     Social History     Substance and Sexual Activity   Drug Use Yes    Types: Marijuana     Social History     Tobacco Use   Smoking Status Current Some Day Smoker    Packs/day: 0 25    Years: 15 00    Pack years: 3 75    Types: Cigarettes   Smokeless Tobacco Current User   Tobacco Comment    pack a month       ALLERGIES:  Allergies   Allergen Reactions    Fish-Derived Products - Food Allergy      Cod    Nuts - Food Allergy      Walnuts      Pollen Extract     Warfarin      Panic attacks    Watermelon [Citrullus Vulgaris]        MEDICATIONS:  All current active medications have been reviewed  Antibiotics:  Vancomycin Zosyn 2    PHYSICAL EXAM:  Temp:  [98 8 °F (37 1 °C)-100 7 °F (38 2 °C)] 98 8 °F (37 1 °C)  HR:  [96] 96  Resp:  [18-20] 18  BP: (122-146)/(79-90) 146/90  SpO2:  [96 %] 96 %  Temp (24hrs), Av 6 °F (37 6 °C), Min:98 8 °F (37 1 °C), Max:100 7 °F (38 2 °C)  Current: Temperature: 98 8 °F (37 1 °C)    Intake/Output Summary (Last 24 hours) at 2022 1654  Last data filed at 2022 1201  Gross per 24 hour   Intake --   Output 695 ml   Net -695 ml       General Appearance:  Appearing well, nontoxic, and in no distress   Head:  Normocephalic, without obvious abnormality, atraumatic   Eyes:  Conjunctiva pink and sclera anicteric, both eyes   Nose: Nares normal, mucosa normal, no drainage   Throat: Oropharynx moist without lesions   Neck: Supple, symmetrical, no adenopathy, no tenderness/mass/nodules   Back:   Symmetric, no curvature, ROM normal, no CVA tenderness   Lungs:   Clear to auscultation bilaterally, respirations unlabored   Chest Wall:  No tenderness or deformity   Heart:  RRR; no murmur, rub or gallop   Abdomen:   Soft, mild right lower quadrant tenderness, positive bowel sounds     BEL drain on the right with bloody purulent fluid  Extremities: No cyanosis, clubbing or edema   Skin: No rashes or lesions  No draining wounds noted  Lymph nodes: Cervical, supraclavicular nodes normal   Neurologic: Alert and oriented times 3, extremity strength 5/5 and symmetric       LABS, IMAGING, & OTHER STUDIES:  Lab Results:  I have personally reviewed pertinent labs    Results from last 7 days   Lab Units 22  0616 22  1326   WBC Thousand/uL 16 47* 12 10*   HEMOGLOBIN g/dL 12 0 11 9*   PLATELETS Thousands/uL 478* 447*     Results from last 7 days   Lab Units 22  0616 22  1326 22  1326   SODIUM mmol/L 134*  --  138   POTASSIUM mmol/L 4 5   < > 3 5   CHLORIDE mmol/L 97*   < > 98*   CO2 mmol/L 26   < > 29   BUN mg/dL 8   < > 10   CREATININE mg/dL 1 13   < > 1 11   EGFR ml/min/1 73sq m 73   < > 75   CALCIUM mg/dL 9 2   < > 8 9    < > = values in this interval not displayed  Results from last 7 days   Lab Units 01/19/22  1158   GRAM STAIN RESULT  2+ Gram positive cocci in pairs and chains*  2+ Gram negative rods*  2+ Gram positive rods*  3+ Polys*  3+ Gram negative rods*  3+ Gram positive rods*  2+ Gram positive cocci in pairs and chains*  No polys seen*   BODY FLUID CULTURE, STERILE  Culture results to follow    4+ Growth of Non lactose fermenting gram negative hector*                       Imaging Studies:     Chest x-ray-no acute cardiopulmonary disease    Images personally reviewed by me in PACS

## 2022-01-20 NOTE — ASSESSMENT & PLAN NOTE
Patient reported some chest pain yesterday  EKG with new t wave inversions  Serial troponins were negative  2D echo was performed yesterday which showed EF of 60% without any regional wall motion abnormalities, grade 1 diastolic dysfunction  Patient underwent nuclear stress test today-pending results  Patient started on metoprolol 25 milligram p o  B i d  Which was changed to Toprol-XL 25 milligram q h s

## 2022-01-20 NOTE — PLAN OF CARE
Problem: GASTROINTESTINAL - ADULT  Goal: Maintains or returns to baseline bowel function  Description: INTERVENTIONS:  - Assess bowel function  - Encourage oral fluids to ensure adequate hydration  - Administer IV fluids if ordered to ensure adequate hydration  - Administer ordered medications as needed  - Encourage mobilization and activity  - Consider nutritional services referral to assist patient with adequate nutrition and appropriate food choices  Outcome: Progressing     Problem: PAIN - ADULT  Goal: Verbalizes/displays adequate comfort level or baseline comfort level  Description: Interventions:  - Encourage patient to monitor pain and request assistance  - Assess pain using appropriate pain scale  - Administer analgesics based on type and severity of pain and evaluate response  - Implement non-pharmacological measures as appropriate and evaluate response  - Consider cultural and social influences on pain and pain management  - Notify physician/advanced practitioner if interventions unsuccessful or patient reports new pain  Outcome: Progressing     Problem: INFECTION - ADULT  Goal: Absence or prevention of progression during hospitalization  Description: INTERVENTIONS:  - Assess and monitor for signs and symptoms of infection  - Monitor lab/diagnostic results  - Monitor endotracheal if appropriate and nasal secretions for changes in amount and color  - [place] appropriate cooling/warming therapies per order  - Administer medications as ordered  - Instruct and encourage patient and family to use good hand hygiene technique  - Identify and instruct in appropriate isolation precautions for identified infection/condition  Outcome: Progressing

## 2022-01-20 NOTE — ASSESSMENT & PLAN NOTE
Lab Results   Component Value Date    HGBA1C 7 1 (H) 11/16/2021       Recent Labs     01/19/22  1620 01/19/22  2045 01/20/22  0826 01/20/22  1104   POCGLU 191* 175* 174* 215*       Blood Sugar Average: Last 72 hrs:  (P) 185 8   Continue Humalog sliding scale with Accu-Cheks q a c  And HS

## 2022-01-20 NOTE — PROGRESS NOTES
[address]  Progress Note - Nancy Boss 1970, 46 y o  male MRN: [id number]  Unit/Bed#: [address] [address] Encounter: [id number]  Primary Care Provider: No primary care provider on file  Date and time admitted to hospital: 1/18/2022 11:40 PM    * Sepsis ([facility] [address] )  Assessment & Plan  As evidenced by tachycardia, leukocytosis  Secondary to liver abscess, fluid collection in the right paracolic gutter  Post drain placement by IR on January 19, 2022    · Patient currently on IV Zosyn and vancomycin  · Lactic acid level was normal  · Body fluid cultures growing gram-negative rods, Gram-positive rods, Gram-positive cocci in pairs and chains and non lactose fermenting gram-negative hector  · Blood cultures were ordered which are pending  · Patient's white count has increased to 16,000 from 12,000  · Monitor fever curve and white count    Chest pain  Assessment & Plan  Patient reported some chest pain yesterday  EKG with new t wave inversions  Serial troponins were negative  2D echo was performed yesterday which showed EF of 60% without any regional wall motion abnormalities, grade 1 diastolic dysfunction  Patient underwent nuclear stress test today-pending results  Patient started on metoprolol 25 milligram p o  B i d  Which was changed to Toprol-XL 25 milligram q h s      HIV exposure  Assessment & Plan  On truvada prophylaxis per ID notes, recent HIV test negative     Hyperlipidemia  Assessment & Plan  Continue Lipitor    Other constipation  Assessment & Plan  Received miralax  Patient started on Colace and senna and also received enema  Patient had 2 large bowel movements yesterday evening    Protein S deficiency ([facility] [address] )  Assessment & Plan  Continue xarelto    Type 2 diabetes mellitus without complication, without long-term current use of insulin [facility])  Assessment & Plan  Lab Results   Component Value Date    HGBA1C 7 1 (H) 11/16/2021       Recent Labs     01/19/22  1620 01/19/22  2045 01/20/22  5743 22  1104   POCGLU 191* 175* 174* 215*       Blood Sugar Average: Last 72 hrs:  (P) 185 8   Continue Humalog sliding scale with Accu-Cheks q a c  And HS  VTE Pharmacologic Prophylaxis:   Moderate Risk (Score 3-4) - Pharmacological DVT Prophylaxis Ordered: rivaroxaban (Xarelto)  Patient Centered Rounds: I performed bedside rounds with nursing staff today  Discussions with Specialists or Other Care Team Provider: yes    Education and Discussions with Family / Patient: yes    Time Spent for Care: 45 minutes  More than 50% of total time spent on counseling and coordination of care as described above  Current Length of Stay: 2 day(s)  Current Patient Status: Inpatient       Code Status: Prior    Subjective:   Patient complains of lower back pain radiating to the right side especially when lying on the back  Denies any tingling or numbness bowel or bladder incontinence or weakness in the legs  Continues to report some intermittent chest discomfort  Objective:     Vitals:   Temp (24hrs), Av 7 °F (37 6 °C), Min:99 °F (37 2 °C), Max:100 7 °F (38 2 °C)    Temp:  [99 °F (37 2 °C)-100 7 °F (38 2 °C)] 99 °F (37 2 °C)  HR:  [] 96  Resp:  [16-20] 20  BP: (122-167)/(79-97) 136/84  SpO2:  [92 %-97 %] 96 %  Body mass index is 25 68 kg/m²  Input and Output Summary (last 24 hours): Intake/Output Summary (Last 24 hours) at 2022 1324  Last data filed at 2022 1201  Gross per 24 hour   Intake --   Output 695 ml   Net -695 ml       Physical Exam:   Physical Exam  Constitutional:       Appearance: Normal appearance  HENT:      Head: Normocephalic and atraumatic  Eyes:      Extraocular Movements: Extraocular movements intact  Pupils: Pupils are equal, round, and reactive to light  Cardiovascular:      Rate and Rhythm: Normal rate and regular rhythm  Heart sounds: No murmur heard  No gallop      Pulmonary:      Effort: Pulmonary effort is normal       Breath sounds: Normal breath sounds  Abdominal:      General: Bowel sounds are normal       Palpations: Abdomen is soft  Tenderness: There is no abdominal tenderness  Comments: BEL drains with serosanguineous drainage   Musculoskeletal:         General: No swelling or deformity  Normal range of motion  Cervical back: Normal range of motion and neck supple  Comments: No spinal tenderness noted  Minimal right flank tenderness on palpation   Skin:     General: Skin is warm and dry  Neurological:      General: No focal deficit present  Mental Status: He is alert  Additional Data:     Labs:  Results from last 7 days   Lab Units 01/20/22  0616 01/19/22  1326 01/19/22  1326   WBC Thousand/uL 16 47*   < > 12 10*   HEMOGLOBIN g/dL 12 0   < > 11 9*   HEMATOCRIT % 35 3*   < > 36 4*   PLATELETS Thousands/uL 478*   < > 447*   NEUTROS PCT %  --   --  59   LYMPHS PCT %  --   --  29   MONOS PCT %  --   --  10   EOS PCT %  --   --  1    < > = values in this interval not displayed       Results from last 7 days   Lab Units 01/20/22  0616   SODIUM mmol/L 134*   POTASSIUM mmol/L 4 5   CHLORIDE mmol/L 97*   CO2 mmol/L 26   BUN mg/dL 8   CREATININE mg/dL 1 13   ANION GAP mmol/L 11   CALCIUM mg/dL 9 2   GLUCOSE RANDOM mg/dL 211*         Results from last 7 days   Lab Units 01/20/22  1104 01/20/22  0826 01/19/22  2045 01/19/22  1620 01/19/22  1328   POC GLUCOSE mg/dl 215* 174* 175* 191* 174*               Lines/Drains:  Invasive Devices  Report    Peripheral Intravenous Line            Peripheral IV 01/19/22 Right Antecubital 1 day          Drain            Closed/Suction Drain Lateral RUQ Bulb 10 Fr  1 day    Closed/Suction Drain Right RLQ Bulb 10 Fr  1 day                      Imaging: Reviewed radiology reports from this admission including: abdominal/pelvic CT    Recent Cultures (last 7 days):   Results from last 7 days   Lab Units 01/19/22  1158   GRAM STAIN RESULT  2+ Gram positive cocci in pairs and chains*  2+ Gram negative rods*  2+ Gram positive rods*  3+ Polys*  3+ Gram negative rods*  3+ Gram positive rods*  2+ Gram positive cocci in pairs and chains*  No polys seen*   BODY FLUID CULTURE, STERILE  Culture results to follow  4+ Growth of Non lactose fermenting gram negative hector*       Last 24 Hours Medication List:   Current Facility-Administered Medications   Medication Dose Route Frequency Provider Last Rate    acetaminophen  650 mg Oral Q6H PRN JONELLE Garza      albuterol  2 puff Inhalation Q4H PRN Ann Si, PA-C      atorvastatin  10 mg Oral Daily Ann Si, PA-C      docusate sodium  100 mg Oral BID JONELLE Garza      emtricitabine-tenofovir (TRUVADA 200-300) combo dose   Oral Daily Ann Si, PA-C      HYDROmorphone  0 2 mg Intravenous Q4H PRN [place] Si, PA-C      insulin lispro  1-5 Units Subcutaneous TID AC [place] Si, PA-C      lactated ringers  75 mL/hr Intravenous Continuous Ann Si, PA-C 75 mL/hr (01/20/22 0555)    metoprolol succinate  25 mg Oral HS Ragena Dandy, CRNP      ondansetron  4 mg Intravenous Q6H PRN Ann Si, PA-C      oxyCODONE  5 mg Oral Q4H PRN JONELLE Garza      piperacillin-tazobactam  3 375 g Intravenous Q6H Jaden Tapia MD Stopped (01/20/22 0701)    pyridoxine  100 mg Oral Daily Ann Si, PA-C      rivaroxaban  20 mg Oral Q24H Ann Si, PA-C      senna  1 tablet Oral HS JONELLE Garza      vancomycin  2,000 mg Intravenous Q12H JONELLE Garza          Today, Patient Was Seen By: Jovita Sykes MD    **Please Note: This note may have been constructed using a voice recognition system  **

## 2022-01-21 LAB
ALBUMIN SERPL BCP-MCNC: 2.6 G/DL (ref 3.5–5)
ALP SERPL-CCNC: 120 U/L (ref 46–116)
ALT SERPL W P-5'-P-CCNC: 29 U/L (ref 12–78)
ANION GAP SERPL CALCULATED.3IONS-SCNC: 8 MMOL/L (ref 4–13)
AST SERPL W P-5'-P-CCNC: 16 U/L (ref 5–45)
BASOPHILS # BLD AUTO: 0.05 THOUSANDS/ΜL (ref 0–0.1)
BASOPHILS NFR BLD AUTO: 0 % (ref 0–1)
BILIRUB SERPL-MCNC: 0.86 MG/DL (ref 0.2–1)
BUN SERPL-MCNC: 7 MG/DL (ref 5–25)
CALCIUM ALBUM COR SERPL-MCNC: 9.9 MG/DL (ref 8.3–10.1)
CALCIUM SERPL-MCNC: 8.8 MG/DL (ref 8.3–10.1)
CHLORIDE SERPL-SCNC: 99 MMOL/L (ref 100–108)
CO2 SERPL-SCNC: 27 MMOL/L (ref 21–32)
CREAT SERPL-MCNC: 1.06 MG/DL (ref 0.6–1.3)
EOSINOPHIL # BLD AUTO: 0.11 THOUSAND/ΜL (ref 0–0.61)
EOSINOPHIL NFR BLD AUTO: 1 % (ref 0–6)
ERYTHROCYTE [DISTWIDTH] IN BLOOD BY AUTOMATED COUNT: 13.4 % (ref 11.6–15.1)
GFR SERPL CREATININE-BSD FRML MDRD: 79 ML/MIN/1.73SQ M
GLUCOSE SERPL-MCNC: 133 MG/DL (ref 65–140)
GLUCOSE SERPL-MCNC: 163 MG/DL (ref 65–140)
GLUCOSE SERPL-MCNC: 175 MG/DL (ref 65–140)
GLUCOSE SERPL-MCNC: 198 MG/DL (ref 65–140)
GLUCOSE SERPL-MCNC: 203 MG/DL (ref 65–140)
HCT VFR BLD AUTO: 34.6 % (ref 36.5–49.3)
HGB BLD-MCNC: 11.2 G/DL (ref 12–17)
IMM GRANULOCYTES # BLD AUTO: 0.08 THOUSAND/UL (ref 0–0.2)
IMM GRANULOCYTES NFR BLD AUTO: 1 % (ref 0–2)
LYMPHOCYTES # BLD AUTO: 1.59 THOUSANDS/ΜL (ref 0.6–4.47)
LYMPHOCYTES NFR BLD AUTO: 11 % (ref 14–44)
MCH RBC QN AUTO: 26.5 PG (ref 26.8–34.3)
MCHC RBC AUTO-ENTMCNC: 32.4 G/DL (ref 31.4–37.4)
MCV RBC AUTO: 82 FL (ref 82–98)
MONOCYTES # BLD AUTO: 0.92 THOUSAND/ΜL (ref 0.17–1.22)
MONOCYTES NFR BLD AUTO: 7 % (ref 4–12)
NEUTROPHILS # BLD AUTO: 11.2 THOUSANDS/ΜL (ref 1.85–7.62)
NEUTS SEG NFR BLD AUTO: 80 % (ref 43–75)
NRBC BLD AUTO-RTO: 0 /100 WBCS
PLATELET # BLD AUTO: 472 THOUSANDS/UL (ref 149–390)
PMV BLD AUTO: 9.1 FL (ref 8.9–12.7)
POTASSIUM SERPL-SCNC: 4.4 MMOL/L (ref 3.5–5.3)
PROT SERPL-MCNC: 8.2 G/DL (ref 6.4–8.2)
RBC # BLD AUTO: 4.22 MILLION/UL (ref 3.88–5.62)
SODIUM SERPL-SCNC: 134 MMOL/L (ref 136–145)
WBC # BLD AUTO: 13.95 THOUSAND/UL (ref 4.31–10.16)

## 2022-01-21 PROCEDURE — 87389 HIV-1 AG W/HIV-1&-2 AB AG IA: CPT | Performed by: INTERNAL MEDICINE

## 2022-01-21 PROCEDURE — 99232 SBSQ HOSP IP/OBS MODERATE 35: CPT | Performed by: INTERNAL MEDICINE

## 2022-01-21 PROCEDURE — 94762 N-INVAS EAR/PLS OXIMTRY CONT: CPT

## 2022-01-21 PROCEDURE — 80053 COMPREHEN METABOLIC PANEL: CPT | Performed by: INTERNAL MEDICINE

## 2022-01-21 PROCEDURE — 85025 COMPLETE CBC W/AUTO DIFF WBC: CPT | Performed by: INTERNAL MEDICINE

## 2022-01-21 PROCEDURE — 99024 POSTOP FOLLOW-UP VISIT: CPT | Performed by: SURGERY

## 2022-01-21 PROCEDURE — 94760 N-INVAS EAR/PLS OXIMETRY 1: CPT

## 2022-01-21 PROCEDURE — 82948 REAGENT STRIP/BLOOD GLUCOSE: CPT

## 2022-01-21 RX ORDER — KETOROLAC TROMETHAMINE 30 MG/ML
15 INJECTION, SOLUTION INTRAMUSCULAR; INTRAVENOUS ONCE
Status: COMPLETED | OUTPATIENT
Start: 2022-01-21 | End: 2022-01-21

## 2022-01-21 RX ADMIN — OXYCODONE HYDROCHLORIDE 5 MG: 5 TABLET ORAL at 00:30

## 2022-01-21 RX ADMIN — INSULIN LISPRO 1 UNITS: 100 INJECTION, SOLUTION INTRAVENOUS; SUBCUTANEOUS at 13:20

## 2022-01-21 RX ADMIN — SENNOSIDES 8.6 MG: 8.6 TABLET, FILM COATED ORAL at 21:08

## 2022-01-21 RX ADMIN — DOCUSATE SODIUM 100 MG: 100 CAPSULE ORAL at 17:34

## 2022-01-21 RX ADMIN — PIPERACILLIN SODIUM AND TAZOBACTAM SODIUM 4.5 G: 4; .5 INJECTION, POWDER, LYOPHILIZED, FOR SOLUTION INTRAVENOUS at 20:36

## 2022-01-21 RX ADMIN — PIPERACILLIN SODIUM AND TAZOBACTAM SODIUM 4.5 G: 4; .5 INJECTION, POWDER, LYOPHILIZED, FOR SOLUTION INTRAVENOUS at 15:14

## 2022-01-21 RX ADMIN — PIPERACILLIN SODIUM AND TAZOBACTAM SODIUM 4.5 G: 4; .5 INJECTION, POWDER, LYOPHILIZED, FOR SOLUTION INTRAVENOUS at 02:44

## 2022-01-21 RX ADMIN — ATORVASTATIN CALCIUM 10 MG: 10 TABLET, FILM COATED ORAL at 10:20

## 2022-01-21 RX ADMIN — METOPROLOL SUCCINATE 25 MG: 25 TABLET, EXTENDED RELEASE ORAL at 21:08

## 2022-01-21 RX ADMIN — OXYCODONE HYDROCHLORIDE 5 MG: 5 TABLET ORAL at 06:18

## 2022-01-21 RX ADMIN — OXYCODONE HYDROCHLORIDE 5 MG: 5 TABLET ORAL at 23:04

## 2022-01-21 RX ADMIN — RIVAROXABAN 20 MG: 20 TABLET, FILM COATED ORAL at 13:19

## 2022-01-21 RX ADMIN — KETOROLAC TROMETHAMINE 15 MG: 30 INJECTION, SOLUTION INTRAMUSCULAR at 18:31

## 2022-01-21 RX ADMIN — OXYCODONE HYDROCHLORIDE 5 MG: 5 TABLET ORAL at 14:40

## 2022-01-21 RX ADMIN — PIPERACILLIN SODIUM AND TAZOBACTAM SODIUM 4.5 G: 4; .5 INJECTION, POWDER, LYOPHILIZED, FOR SOLUTION INTRAVENOUS at 10:20

## 2022-01-21 RX ADMIN — PYRIDOXINE HCL TAB 50 MG 100 MG: 50 TAB at 10:20

## 2022-01-21 RX ADMIN — DOCUSATE SODIUM 100 MG: 100 CAPSULE ORAL at 10:20

## 2022-01-21 RX ADMIN — ACETAMINOPHEN 650 MG: 325 TABLET, FILM COATED ORAL at 06:23

## 2022-01-21 RX ADMIN — INSULIN LISPRO 1 UNITS: 100 INJECTION, SOLUTION INTRAVENOUS; SUBCUTANEOUS at 08:19

## 2022-01-21 RX ADMIN — HYDROMORPHONE HYDROCHLORIDE 0.2 MG: 1 INJECTION, SOLUTION INTRAMUSCULAR; INTRAVENOUS; SUBCUTANEOUS at 17:34

## 2022-01-21 NOTE — ASSESSMENT & PLAN NOTE
Post BEL drain  Patient is on IV antibiotics  Plan per surgery  BEL drain from right upper quadrant with 15 milliliters and right lower quadrant with 30 milliliters serosanguineous  History of laparoscopic appendectomy on January 11, 2022

## 2022-01-21 NOTE — PROGRESS NOTES
Progress Note - Infectious Disease   Devorah Borrero 46 y o  male MRN: [id number]  Unit/Bed#: [address] [place] [address] Encounter: [id number]      Impression/Plan:  1  Sepsis-POA within 24 hours of admission  Fever and leukocytosis  Appears to be secondary to intra-abdominal and hepatic abscess formation after recent appendectomy  Consideration for the possibility of bacteremia  Thus far the blood cultures are negative  The Gram stain suggest a polymicrobial infection as expected  -continue Zosyn for now at current dose  -follow up cultures and adjust antibiotics as needed  -recheck CBC with diff and CMP  -supportive care  -likely to oral antibiotics early next week if continues to improve and sensitivities allow     2  Hepatic abscess-in the setting of recent appendicitis status post appendectomy  The culture appears polymicrobial as expected  He has undergone percutaneous drainage with a drain remaining in place   -antibiotics as above  -follow up ID and sensitivity and adjust antibiotics as needed  -drain management  -serial abdominal exams  -close surgical follow-up     3  Intra-abdominal/pericolic abscess-in the setting of recent appendicitis status post appendectomy  Patient is status post percutaneous drainage  As expected the culture appears polymicrobial   -antibiotics as above  -follow up sensitivities and adjust antibiotics as needed  -drain management  -close surgical follow-up     4  Syphilis-recurrent and recently diagnosed  -while the piperacillin tazobactam may have activity in this situation will hold on specific directed therapy for now  -patient will need treatment with intramuscular benzathine penicillin as an outpatient and will follow up with the [address] [place] [address] Drive     5  High risk sexual activity-the patient is on pre exposure prophylaxis with Truvada  He is clearly not using safe sex practices with recently having recurrent bouts of syphilis    -will hold on pre exposure prophylaxis with Truvada until the patient is an outpatient  -check HIV screen  -treatment of syphilis as above      Discussed the above management plan with the primary service    Antibiotics:  Zosyn 3  Postprocedure day 2    Subjective:  Patient has no fever, chills, sweats; no nausea, vomiting, diarrhea; no cough, shortness of breath; still having some right-sided pain is most significant posterior  No new symptoms  Objective:  Vitals:  Temp:  [98 5 °F (36 9 °C)-98 8 °F (37 1 °C)] 98 5 °F (36 9 °C)  HR:  [75-83] 75  Resp:  [18-21] 21  BP: (122-146)/(81-95) 122/81  SpO2:  [96 %-100 %] 96 %  Temp (24hrs), Av 6 °F (37 °C), Min:98 5 °F (36 9 °C), Max:98 8 °F (37 1 °C)  Current: Temperature: 98 5 °F (36 9 °C)    Physical Exam:   General Appearance:  Alert, interactive, nontoxic, no acute distress  Throat: Oropharynx moist without lesions  Lungs:   Clear to auscultation bilaterally; no wheezes, rhonchi or rales; respirations unlabored   Heart:  RRR; no murmur, rub or gallop   Abdomen:   Soft, mild right-sided tenderness, non-distended, positive bowel sounds  To right-sided BEL drains in place  Extremities: No clubbing, cyanosis or edema   Skin: No new rashes or lesions  No draining wounds noted         Labs, Imaging, & Other studies:   All pertinent labs and imaging studies were personally reviewed  Results from last 7 days   Lab Units 22  0443 22  0616 22  1326   WBC Thousand/uL 13 95* 16 47* 12 10*   HEMOGLOBIN g/dL 11 2* 12 0 11 9*   PLATELETS Thousands/uL 472* 478* 447*     Results from last 7 days   Lab Units 22  0443 22  0616 22  0616 22  1326 22  1326   SODIUM mmol/L 134*  --  134*  --  138   POTASSIUM mmol/L 4 4   < > 4 5   < > 3 5   CHLORIDE mmol/L 99*   < > 97*   < > 98*   CO2 mmol/L 27   < > 26   < > 29   BUN mg/dL 7   < > 8   < > 10   CREATININE mg/dL 1 06   < > 1 13   < > 1 11   EGFR ml/min/1 73sq m 79   < > 73   < > 75   CALCIUM mg/dL 8 8   < > 9 2   < > 8 9 AST U/L 16  --   --   --   --    ALT U/L 29  --   --   --   --    ALK PHOS U/L 120*  --   --   --   --     < > = values in this interval not displayed  Results from last 7 days   Lab Units 01/20/22  1646 01/20/22  1500 01/19/22  1158   BLOOD CULTURE  Received in Microbiology Lab  Culture in Progress  Received in Microbiology Lab  Culture in Progress    --    GRAM STAIN RESULT   --   --  2+ Gram positive cocci in pairs and chains*  2+ Gram negative rods*  2+ Gram positive rods*  3+ Polys*  3+ Gram negative rods*  3+ Gram positive rods*  2+ Gram positive cocci in pairs and chains*  No polys seen*   BODY FLUID CULTURE, STERILE   --   --  1+ Growth of Gram Negative Fady Enteric Like*  Few Colonies of Non lactose fermenting gram negative fady*  1+ Growth of Enterococcus species*  4+ Growth of Escherichia coli*  4+ Growth of Enterococcus avium*

## 2022-01-21 NOTE — ASSESSMENT & PLAN NOTE
Received miralax  Patient started on Colace and senna and also received enema  Patient had 2 large bowel movements and has been having regular bowel movements

## 2022-01-21 NOTE — PROGRESS NOTES
[address]   Progress Note - Caroline Bob 1970, 46 y o  male MRN: [id number]  Unit/Bed#: [address] [place] [address] Encounter: [id number]  Primary Care Provider: No primary care provider on file  Date and time admitted to hospital: 1/18/2022 11:40 PM    * Sepsis ([address] )  Assessment & Plan  As evidenced by tachycardia, leukocytosis  Secondary to liver abscess, fluid collection in the right paracolic gutter  Post drain placement by IR on January 19, 2022    · Patient has been on IV Zosyn  · Lactic acid level was normal  · Body fluid cultures growing E coli and Enterococcus  Anaerobic cultures are pending  · Blood cultures were ordered which are pending  · White count increase since admission and again improved  · Monitor fever curve and white count  · Possible transition to oral antibiotics early next week if patient continues to improve      Results from last 7 days   Lab Units 01/21/22  0443 01/20/22  0616 01/19/22  1326   WBC Thousand/uL 13 95* 16 47* 12 10*       Intra-abdominal abscess post-procedure  Assessment & Plan  Post BEL drain  Continue IV Zosyn  BEL drain from right upper quadrant with 15 milliliters and right lower quadrant with 30 milliliters serosanguineous drainage  White count has improved and T-max was 100 7° last night      Chest pain  Assessment & Plan  Patient reported some chest pain yesterday  EKG with new t wave inversions  Serial troponins were negative  2D echo was performed yesterday which showed EF of 60% without any regional wall motion abnormalities, grade 1 diastolic dysfunction  Nuclear stress test showed no evidence of ischemia  Patient started on metoprolol 25 milligram p o  B i d  Which was changed to Toprol-XL 25 milligram q h s      Liver abscess  Assessment & Plan  Post BEL drain  Patient is on IV antibiotics  Plan per surgery  BEL drain from right upper quadrant with 15 milliliters and right lower quadrant with 30 milliliters serosanguineous  History of laparoscopic appendectomy on 2022    HIV exposure  Assessment & Plan  On truvada prophylaxis per ID notes, recent HIV test negative     Hyperlipidemia  Assessment & Plan  Continue Lipitor    Other constipation  Assessment & Plan  Received miralax  Patient started on Colace and senna and also received enema  Patient had 2 large bowel movements and has been having regular bowel movements    Protein S deficiency ([address] )  Assessment & Plan  Continue xarelto    Type 2 diabetes mellitus without complication, without long-term current use of insulin [facility])  Assessment & Plan  Lab Results   Component Value Date    HGBA1C 7 1 (H) 2021       Recent Labs     22  1606 22  2125 22  0731 22  1111   POCGLU 194* 193* 163* 175*       Blood Sugar Average: Last 72 hrs:  (P) 183 [id number]   Continue Humalog sliding scale with Accu-Cheks q a c  And HS  Blood sugars stable      VTE Pharmacologic Prophylaxis:   Moderate Risk (Score 3-4) - Pharmacological DVT Prophylaxis Ordered: rivaroxaban (Xarelto)  Patient Centered Rounds: I performed bedside rounds with nursing staff today  Discussions with Specialists or Other Care Team Provider: Claudia aVsquez    Education and Discussions with Family / Patient: Yes    Time Spent for Care: 45 minutes  More than 50% of total time spent on counseling and coordination of care as described above  Current Length of Stay: 3 day(s)  Current Patient Status: Inpatient     Code Status: Prior    Subjective:   Patient is having regular bowel movements  Improved abdominal pain and chest pain and back pain  Patient got very uncomfortable last night in the bed and stayed in the chair      Objective:     Vitals:   Temp (24hrs), Av 6 °F (37 °C), Min:98 5 °F (36 9 °C), Max:98 8 °F (37 1 °C)    Temp:  [98 5 °F (36 9 °C)-98 8 °F (37 1 °C)] 98 5 °F (36 9 °C)  HR:  [75-83] 75  Resp:  [18-21] 21  BP: (122-146)/(81-95) 122/81  SpO2:  [96 %-100 %] 96 %  Body mass index is 25 68 kg/m²  Input and Output Summary (last 24 hours): Intake/Output Summary (Last 24 hours) at 1/21/2022 1223  Last data filed at 1/21/2022 8975  Gross per 24 hour   Intake --   Output 45 ml   Net -45 ml       Physical Exam:   Physical Exam  Constitutional:       Appearance: Normal appearance  HENT:      Head: Normocephalic and atraumatic  Eyes:      Extraocular Movements: Extraocular movements intact  Pupils: Pupils are equal, round, and reactive to light  Cardiovascular:      Rate and Rhythm: Normal rate and regular rhythm  Heart sounds: No murmur heard  No gallop  Pulmonary:      Effort: Pulmonary effort is normal       Breath sounds: Normal breath sounds  Abdominal:      General: Bowel sounds are normal       Palpations: Abdomen is soft  Tenderness: There is no abdominal tenderness  Comments: BEL drains with serosanguineous drainage   Musculoskeletal:         General: No swelling or deformity  Normal range of motion  Cervical back: Normal range of motion and neck supple  Skin:     General: Skin is warm and dry  Neurological:      General: No focal deficit present  Mental Status: He is alert          Additional Data:     Labs:  Results from last 7 days   Lab Units 01/21/22  0443   WBC Thousand/uL 13 95*   HEMOGLOBIN g/dL 11 2*   HEMATOCRIT % 34 6*   PLATELETS Thousands/uL 472*   NEUTROS PCT % 80*   LYMPHS PCT % 11*   MONOS PCT % 7   EOS PCT % 1     Results from last 7 days   Lab Units 01/21/22  0443   SODIUM mmol/L 134*   POTASSIUM mmol/L 4 4   CHLORIDE mmol/L 99*   CO2 mmol/L 27   BUN mg/dL 7   CREATININE mg/dL 1 06   ANION GAP mmol/L 8   CALCIUM mg/dL 8 8   ALBUMIN g/dL 2 6*   TOTAL BILIRUBIN mg/dL 0 86   ALK PHOS U/L 120*   ALT U/L 29   AST U/L 16   GLUCOSE RANDOM mg/dL 198*         Results from last 7 days   Lab Units 01/21/22  1111 01/21/22  0731 01/20/22  2125 01/20/22  1606 01/20/22  1104 01/20/22  0826 01/19/22  2045 01/19/22  1620 01/19/22  1328   POC GLUCOSE mg/dl 175* 163* 193* 194* 215* 174* 175* 191* 174*               Lines/Drains:  Invasive Devices  Report    Peripheral Intravenous Line            Peripheral IV 01/19/22 Right Antecubital 2 days          Drain            Closed/Suction Drain Lateral RUQ Bulb 10 Fr  2 days    Closed/Suction Drain Right RLQ Bulb 10 Fr  2 days                      Imaging: Reviewed radiology reports from this admission including: abdominal/pelvic CT    Recent Cultures (last 7 days):   Results from last 7 days   Lab Units 01/20/22  1646 01/20/22  1500 01/19/22  1158   BLOOD CULTURE  Received in Microbiology Lab  Culture in Progress  Received in Microbiology Lab  Culture in Progress    --    GRAM STAIN RESULT   --   --  2+ Gram positive cocci in pairs and chains*  2+ Gram negative rods*  2+ Gram positive rods*  3+ Polys*  3+ Gram negative rods*  3+ Gram positive rods*  2+ Gram positive cocci in pairs and chains*  No polys seen*   BODY FLUID CULTURE, STERILE   --   --  1+ Growth of Gram Negative Fady Enteric Like*  Few Colonies of Non lactose fermenting gram negative fady*  1+ Growth of Enterococcus species*  4+ Growth of Escherichia coli*  4+ Growth of Enterococcus avium*       Last 24 Hours Medication List:   Current Facility-Administered Medications   Medication Dose Route Frequency Provider Last Rate    acetaminophen  650 mg Oral Q6H PRN JONELLE Nino      albuterol  2 puff Inhalation Q4H PRN Garrison Fulton PA-C      atorvastatin  10 mg Oral Daily Garrison Fulton PA-C      docusate sodium  100 mg Oral BID JONELLE Nino      HYDROmorphone  0 2 mg Intravenous Q4H PRN Garrison Fulton PA-C      insulin lispro  1-5 Units Subcutaneous TID AC Jb Carrasco PA-C      lactated ringers  75 mL/hr Intravenous Continuous Garrison Fulton PA-C 75 mL/hr (01/20/22 1822)    metoprolol succinate  25 mg Oral HS JONELLE Dee      ondansetron  4 mg Intravenous Q6H PRN Tonio Whaley BLADIMIR Carrasco      oxyCODONE  5 mg Oral Q4H PRN JONELLE Rosen      piperacillin-tazobactam  4 5 g Intravenous Q6H Christelle Estrada MD 4 5 g (01/21/22 1020)    pyridoxine  100 mg Oral Daily Mykel Beltran PA-C      rivaroxaban  20 mg Oral Q24H Mykel Beltran PA-C      senna  1 tablet Oral HS JONELLE Rosen          Today, Patient Was Seen By: Magdalena Brown MD    **Please Note: This note may have been constructed using a voice recognition system  **

## 2022-01-21 NOTE — ASSESSMENT & PLAN NOTE
Post BEL drain  Continue IV Zosyn  BEL drain from right upper quadrant with 15 milliliters and right lower quadrant with 30 milliliters serosanguineous drainage  White count has improved and T-max was 100 7° last night

## 2022-01-21 NOTE — ASSESSMENT & PLAN NOTE
As evidenced by tachycardia, leukocytosis  Secondary to liver abscess, fluid collection in the right paracolic gutter  Post drain placement by IR on January 19, 2022    · Patient has been on IV Zosyn  · Lactic acid level was normal  · Body fluid cultures growing E coli and Enterococcus  Anaerobic cultures are pending  · Blood cultures were ordered which are pending  · White count increase since admission and again improved    · Monitor fever curve and white count  · Possible transition to oral antibiotics early next week if patient continues to improve      Results from last 7 days   Lab Units 01/21/22  0443 01/20/22  0616 01/19/22  1326   WBC Thousand/uL 13 95* 16 47* 12 10*

## 2022-01-21 NOTE — CONSULTS
The patient's vancomycin therapy has been discontinued  Pharmacy will sign off now, thank you for this consult       Kathy Fleming, CarlosD

## 2022-01-21 NOTE — PLAN OF CARE
Problem: GASTROINTESTINAL - ADULT  Goal: Maintains or returns to baseline bowel function  Description: INTERVENTIONS:  - Assess bowel function  - Encourage oral fluids to ensure adequate hydration  - Administer IV fluids if ordered to ensure adequate hydration  - Administer ordered medications as needed  - Encourage mobilization and activity  - Consider nutritional services referral to assist patient with adequate nutrition and appropriate food choices  Outcome: Progressing     Problem: PAIN - ADULT  Goal: Verbalizes/displays adequate comfort level or baseline comfort level  Description: Interventions:  - Encourage patient to monitor pain and request assistance  - Assess pain using appropriate pain scale  - Administer analgesics based on type and severity of pain and evaluate response  - Implement non-pharmacological measures as appropriate and evaluate response  - Consider cultural and social influences on pain and pain management  - Notify physician/advanced practitioner if interventions unsuccessful or patient reports new pain  Outcome: Progressing     Problem: INFECTION - ADULT  Goal: Absence or prevention of progression during hospitalization  Description: INTERVENTIONS:  - Assess and monitor for signs and symptoms of infection  - Monitor lab/diagnostic results  - Monitor endotracheal if appropriate and nasal secretions for changes in amount and color  - [place] appropriate cooling/warming therapies per order  - Administer medications as ordered  - Instruct and encourage patient and family to use good hand hygiene technique  - Identify and instruct in appropriate isolation precautions for identified infection/condition  Outcome: Progressing     Problem: Potential for Falls  Goal: Patient will remain free of falls  Description: INTERVENTIONS:  - Educate patient/family on patient safety including physical limitations  - Instruct patient to call for assistance with activity   - Consult OT/PT to assist with strengthening/mobility   - Keep Call bell within reach  - Keep bed low and locked with side rails adjusted as appropriate  - Keep care items and personal belongings within reach  - Initiate and maintain comfort rounds  - Make Fall Risk Sign visible to staff  - Offer Toileting every  Hours, in advance of need  - Initiate/Maintain alarm  - Obtain necessary fall risk management equipment:   - Apply yellow socks and bracelet for high fall risk patients  - Consider moving patient to room near nurses station  Outcome: Progressing     Problem: MOBILITY - ADULT  Goal: Maintain or return to baseline ADL function  Description: INTERVENTIONS:  -  Assess patient's ability to carry out ADLs; assess patient's baseline for ADL function and identify physical deficits which impact ability to perform ADLs (bathing, care of mouth/teeth, toileting, grooming, dressing, etc )  - Assess/evaluate cause of self-care deficits   - Assess range of motion  - Assess patient's mobility; develop plan if impaired  - Assess patient's need for assistive devices and provide as appropriate  - Encourage maximum independence but intervene and supervise when necessary  - Involve family in performance of ADLs  - Assess for home care needs following discharge   - Consider OT consult to assist with ADL evaluation and planning for discharge  - Provide patient education as appropriate  Outcome: Progressing

## 2022-01-21 NOTE — CASE MANAGEMENT
Case Management Discharge Planning Note    Patient name Neil Plan  Location 2 [place] 212/2 [place] 1 MRN [id number]  : 1970 Date 2022       Current Admission Date: 2022  Current Admission Diagnosis:Sepsis [facility])   Patient Active Problem List    Diagnosis Date Noted    Liver abscess 2022    Intra-abdominal abscess post-procedure 2022    Other constipation 2022    Sepsis ([facility] ) 2022    Hyperlipidemia 2022    Chest pain 2022    Acute perforated appendicitis 2022    Current use of long term anticoagulation 2022    Syphilis 2021    Gross hematuria 2021    High risk sexual behavior 10/14/2020    Positive QuantiFERON-TB Gold test 2020    Post-traumatic bulbous urethral stricture 2019    Spermatocele of epididymis 2019    Encounter for wellness examination 2019    Tobacco dependence 2019    Testicle lump 2019    Other male erectile dysfunction 2019    Near syncope 2018    Deep venous thrombosis ([facility] ) 2018    HIV exposure 2018    Mild intermittent asthma without complication     Protein S deficiency ([facility] ) 2018    Positive RPR test 2016    Peptic ulcer disease 2016    Schizoaffective disorder, bipolar type ([facility] ) 2016    Generalized anxiety disorder 2016    Cannabis abuse, episodic 2016    Dyslexia, developmental 2016    Benign prostatic hyperplasia with urinary frequency 2016    Type 2 diabetes mellitus without complication, without long-term current use of insulin ([facility] ) 2016    Epilepsy ([facility] ) 2016      LOS (days): 3  Geometric Mean LOS (GMLOS) (days): 3 20  Days to GMLOS:0 5     OBJECTIVE:  Risk of Unplanned Readmission Score: 17   Bundled Patient Payment: No Current Bundle     Current admission status: Inpatient   Preferred Pharmacy:   [address] [place] [address],[address], [place] - [address] [facility] [address] [facility]  Phone: [phone] Fax: [phone]    [facility] [address] [facility], [address] [facility]  Phone: [phone] Fax: [phone]    [address], [address] [place] [address]  Phone: [phone] Fax: [phone]    Primary Care Provider: No primary care provider on file  Primary Insurance: [employer] [facility] REP  Secondary Insurance: [employer]    DISCHARGE DETAILS:      SW was notified that patient had asked to speak with CM  Patient stated that he wants to move out of his current room at the [facility] in [facility]  He stated that he is unable to use shelter programs due to being on the sex offender registry  MERVIN provided information to patient on housing in [facility] and on mental health resources in [facility] per his request       Patient also stated that he is interested in short-term rehabilitation  MERVIN explained to patient that STR options would be very limited due to his sex offender status  SW notified attending of patient's request   Matthew Goode will continue to follow for discharge planning needs

## 2022-01-21 NOTE — ASSESSMENT & PLAN NOTE
Lab Results   Component Value Date    HGBA1C 7 1 (H) 11/16/2021       Recent Labs     01/20/22  1606 01/20/22  2125 01/21/22  0731 01/21/22  1111   POCGLU 194* 193* 163* 175*       Blood Sugar Average: Last 72 hrs:  (P) 183 [id number]   Continue Humalog sliding scale with Accu-Cheks q a c  And HS    Blood sugars stable

## 2022-01-21 NOTE — PROGRESS NOTES
Progress Note - General Surgery   Mariana Means 46 y o  male MRN: [id number]  Unit/Bed#: [address] [place] [address] Encounter: [id number]    Assessment:  · Intra-abdominal and heptaic abscess s/p IR Ct guided percutaneous drainage 01/19/2022  · Richard drain output: RUQ lateral: 15mL, RLQ: 30mL  serosang  · WBC:13 95 (16 47)  · Tmax  100 7 last evening  · S/p laparoscopic appendectomy 1/11/22  · IDDM: HgbA1c: 7 1  · Protein S deficiency: on Xeralto  · Chest pain:  Serial troponins negative  CXR: no acute cardio/pulmonary disease, ECG showed T wave inversion, Stress test: negative stress induced myocardial ischemia  No perfusion defects  EF 61%  · Constipation: patient reports another BM last evening (total of 3 Bm's)  Plan:  IV antibiotics per ID  Cultures pending  Will hold PREP until outpatient per ID  PRN analgesics  Cardiology and Internal medicine input appreciated  OOB and ambulate  Tight glucose control  Continue Xeralto  Subjective/Objective   Chief Complaint:" My pain is improving but very tender "    Subjective: Beltrannet was seen and examined bedside  Patient complaining of continued right side flank back pain  Pain made better when lying on left side  Pain is better compared to yesterday  Objective:2 richard drains intact with serosang output  Blood pressure 122/81, pulse 75, temperature 98 5 °F (36 9 °C), temperature source Oral, resp  rate 21, height 5' 10" (1 778 m), weight 81 2 kg (179 lb), SpO2 96 %  ,Body mass index is 25 68 kg/m²        Intake/Output Summary (Last 24 hours) at 1/21/2022 0840  Last data filed at 1/21/2022 5379  Gross per 24 hour   Intake --   Output 395 ml   Net -395 ml       Invasive Devices  Report    Peripheral Intravenous Line            Peripheral IV 01/19/22 Right Antecubital 2 days          Drain            Closed/Suction Drain Lateral RUQ Bulb 10 Fr  1 day    Closed/Suction Drain Right RLQ Bulb 10 Fr  1 day                Physical Exam: /81 (BP Location: Left arm) Pulse 75   Temp 98 5 °F (36 9 °C) (Oral)   Resp 21   Ht 5' 10" (1 778 m)   Wt 81 2 kg (179 lb)   SpO2 96%   BMI 25 68 kg/m²   General appearance: alert and oriented, in no acute distress  Head: Normocephalic, without obvious abnormality, atraumatic  Lungs: clear to auscultation bilaterally  Heart: regular rate and rhythm, S1, S2 normal, no murmur, click, rub or gallop  Abdomen: mildly distenion, + BS, generalzied tenderness RUQ and insertion of drain site  Skin: Skin color, texture, turgor normal  No rashes or lesions    Lab, Imaging and other studies:  I have personally reviewed pertinent lab results  , CBC:   Lab Results   Component Value Date    WBC 13 95 (H) 01/21/2022    HGB 11 2 (L) 01/21/2022    HCT 34 6 (L) 01/21/2022    MCV 82 01/21/2022     (H) 01/21/2022    MCH 26 5 (L) 01/21/2022    MCHC 32 4 01/21/2022    RDW 13 4 01/21/2022    MPV 9 1 01/21/2022    NRBC 0 01/21/2022   , CMP:   Lab Results   Component Value Date    SODIUM 134 (L) 01/21/2022    K 4 4 01/21/2022    CL 99 (L) 01/21/2022    CO2 27 01/21/2022    BUN 7 01/21/2022    CREATININE 1 06 01/21/2022    CALCIUM 8 8 01/21/2022    AST 16 01/21/2022    ALT 29 01/21/2022    ALKPHOS 120 (H) 01/21/2022    EGFR 79 01/21/2022     VTE Pharmacologic Prophylaxis: Xeralto    VTE Mechanical Prophylaxis: sequential compression device

## 2022-01-22 PROBLEM — R07.9 CHEST PAIN: Status: RESOLVED | Noted: 2022-01-19 | Resolved: 2022-01-22

## 2022-01-22 LAB
ANION GAP SERPL CALCULATED.3IONS-SCNC: 6 MMOL/L (ref 4–13)
ANION GAP SERPL CALCULATED.3IONS-SCNC: 9 MMOL/L (ref 4–13)
APTT PPP: 44 SECONDS (ref 23–37)
BACTERIA SPEC ANAEROBE CULT: ABNORMAL
BACTERIA SPEC BFLD CULT: ABNORMAL
BACTERIA SPEC BFLD CULT: ABNORMAL
BASOPHILS # BLD AUTO: 0.05 THOUSANDS/ΜL (ref 0–0.1)
BASOPHILS # BLD AUTO: 0.06 THOUSANDS/ΜL (ref 0–0.1)
BASOPHILS NFR BLD AUTO: 0 % (ref 0–1)
BASOPHILS NFR BLD AUTO: 0 % (ref 0–1)
BUN SERPL-MCNC: 8 MG/DL (ref 5–25)
BUN SERPL-MCNC: 8 MG/DL (ref 5–25)
CALCIUM SERPL-MCNC: 8.6 MG/DL (ref 8.3–10.1)
CALCIUM SERPL-MCNC: 8.9 MG/DL (ref 8.3–10.1)
CARDIAC TROPONIN I PNL SERPL HS: 4 NG/L (ref 8–18)
CHLORIDE SERPL-SCNC: 98 MMOL/L (ref 100–108)
CHLORIDE SERPL-SCNC: 99 MMOL/L (ref 100–108)
CO2 SERPL-SCNC: 27 MMOL/L (ref 21–32)
CO2 SERPL-SCNC: 28 MMOL/L (ref 21–32)
CREAT SERPL-MCNC: 1.09 MG/DL (ref 0.6–1.3)
CREAT SERPL-MCNC: 1.1 MG/DL (ref 0.6–1.3)
EOSINOPHIL # BLD AUTO: 0.14 THOUSAND/ΜL (ref 0–0.61)
EOSINOPHIL # BLD AUTO: 0.2 THOUSAND/ΜL (ref 0–0.61)
EOSINOPHIL NFR BLD AUTO: 1 % (ref 0–6)
EOSINOPHIL NFR BLD AUTO: 1 % (ref 0–6)
ERYTHROCYTE [DISTWIDTH] IN BLOOD BY AUTOMATED COUNT: 13.5 % (ref 11.6–15.1)
ERYTHROCYTE [DISTWIDTH] IN BLOOD BY AUTOMATED COUNT: 13.6 % (ref 11.6–15.1)
GFR SERPL CREATININE-BSD FRML MDRD: 76 ML/MIN/1.73SQ M
GFR SERPL CREATININE-BSD FRML MDRD: 77 ML/MIN/1.73SQ M
GLUCOSE SERPL-MCNC: 160 MG/DL (ref 65–140)
GLUCOSE SERPL-MCNC: 165 MG/DL (ref 65–140)
GLUCOSE SERPL-MCNC: 172 MG/DL (ref 65–140)
GLUCOSE SERPL-MCNC: 179 MG/DL (ref 65–140)
GLUCOSE SERPL-MCNC: 182 MG/DL (ref 65–140)
GLUCOSE SERPL-MCNC: 216 MG/DL (ref 65–140)
GRAM STN SPEC: ABNORMAL
HCT VFR BLD AUTO: 32.1 % (ref 36.5–49.3)
HCT VFR BLD AUTO: 32.9 % (ref 36.5–49.3)
HGB BLD-MCNC: 10.3 G/DL (ref 12–17)
HGB BLD-MCNC: 11.1 G/DL (ref 12–17)
IMM GRANULOCYTES # BLD AUTO: 0.09 THOUSAND/UL (ref 0–0.2)
IMM GRANULOCYTES # BLD AUTO: 0.12 THOUSAND/UL (ref 0–0.2)
IMM GRANULOCYTES NFR BLD AUTO: 1 % (ref 0–2)
IMM GRANULOCYTES NFR BLD AUTO: 1 % (ref 0–2)
LYMPHOCYTES # BLD AUTO: 1.3 THOUSANDS/ΜL (ref 0.6–4.47)
LYMPHOCYTES # BLD AUTO: 2.57 THOUSANDS/ΜL (ref 0.6–4.47)
LYMPHOCYTES NFR BLD AUTO: 10 % (ref 14–44)
LYMPHOCYTES NFR BLD AUTO: 17 % (ref 14–44)
MAGNESIUM SERPL-MCNC: 1.9 MG/DL (ref 1.6–2.6)
MCH RBC QN AUTO: 26.3 PG (ref 26.8–34.3)
MCH RBC QN AUTO: 27.1 PG (ref 26.8–34.3)
MCHC RBC AUTO-ENTMCNC: 32.1 G/DL (ref 31.4–37.4)
MCHC RBC AUTO-ENTMCNC: 33.7 G/DL (ref 31.4–37.4)
MCV RBC AUTO: 80 FL (ref 82–98)
MCV RBC AUTO: 82 FL (ref 82–98)
MONOCYTES # BLD AUTO: 1 THOUSAND/ΜL (ref 0.17–1.22)
MONOCYTES # BLD AUTO: 1.22 THOUSAND/ΜL (ref 0.17–1.22)
MONOCYTES NFR BLD AUTO: 7 % (ref 4–12)
MONOCYTES NFR BLD AUTO: 8 % (ref 4–12)
NEUTROPHILS # BLD AUTO: 10.98 THOUSANDS/ΜL (ref 1.85–7.62)
NEUTROPHILS # BLD AUTO: 11.12 THOUSANDS/ΜL (ref 1.85–7.62)
NEUTS SEG NFR BLD AUTO: 73 % (ref 43–75)
NEUTS SEG NFR BLD AUTO: 81 % (ref 43–75)
NRBC BLD AUTO-RTO: 0 /100 WBCS
NRBC BLD AUTO-RTO: 0 /100 WBCS
PHOSPHATE SERPL-MCNC: 2.8 MG/DL (ref 2.7–4.5)
PLATELET # BLD AUTO: 480 THOUSANDS/UL (ref 149–390)
PLATELET # BLD AUTO: 516 THOUSANDS/UL (ref 149–390)
PMV BLD AUTO: 9.1 FL (ref 8.9–12.7)
PMV BLD AUTO: 9.4 FL (ref 8.9–12.7)
POTASSIUM SERPL-SCNC: 4.1 MMOL/L (ref 3.5–5.3)
POTASSIUM SERPL-SCNC: 4.1 MMOL/L (ref 3.5–5.3)
RBC # BLD AUTO: 3.91 MILLION/UL (ref 3.88–5.62)
RBC # BLD AUTO: 4.09 MILLION/UL (ref 3.88–5.62)
SODIUM SERPL-SCNC: 133 MMOL/L (ref 136–145)
SODIUM SERPL-SCNC: 134 MMOL/L (ref 136–145)
WBC # BLD AUTO: 13.73 THOUSAND/UL (ref 4.31–10.16)
WBC # BLD AUTO: 15.12 THOUSAND/UL (ref 4.31–10.16)

## 2022-01-22 PROCEDURE — 80048 BASIC METABOLIC PNL TOTAL CA: CPT | Performed by: PHYSICIAN ASSISTANT

## 2022-01-22 PROCEDURE — 99232 SBSQ HOSP IP/OBS MODERATE 35: CPT | Performed by: INTERNAL MEDICINE

## 2022-01-22 PROCEDURE — 94760 N-INVAS EAR/PLS OXIMETRY 1: CPT

## 2022-01-22 PROCEDURE — 97530 THERAPEUTIC ACTIVITIES: CPT

## 2022-01-22 PROCEDURE — 85730 THROMBOPLASTIN TIME PARTIAL: CPT | Performed by: INTERNAL MEDICINE

## 2022-01-22 PROCEDURE — 84484 ASSAY OF TROPONIN QUANT: CPT | Performed by: INTERNAL MEDICINE

## 2022-01-22 PROCEDURE — 83735 ASSAY OF MAGNESIUM: CPT | Performed by: INTERNAL MEDICINE

## 2022-01-22 PROCEDURE — 97163 PT EVAL HIGH COMPLEX 45 MIN: CPT

## 2022-01-22 PROCEDURE — 85025 COMPLETE CBC W/AUTO DIFF WBC: CPT | Performed by: PHYSICIAN ASSISTANT

## 2022-01-22 PROCEDURE — 80048 BASIC METABOLIC PNL TOTAL CA: CPT | Performed by: INTERNAL MEDICINE

## 2022-01-22 PROCEDURE — 84100 ASSAY OF PHOSPHORUS: CPT | Performed by: INTERNAL MEDICINE

## 2022-01-22 PROCEDURE — 94640 AIRWAY INHALATION TREATMENT: CPT

## 2022-01-22 PROCEDURE — 99024 POSTOP FOLLOW-UP VISIT: CPT | Performed by: SURGERY

## 2022-01-22 PROCEDURE — 82948 REAGENT STRIP/BLOOD GLUCOSE: CPT

## 2022-01-22 PROCEDURE — 85025 COMPLETE CBC W/AUTO DIFF WBC: CPT | Performed by: INTERNAL MEDICINE

## 2022-01-22 RX ORDER — NITROGLYCERIN 0.4 MG/1
0.4 TABLET SUBLINGUAL
Status: DISCONTINUED | OUTPATIENT
Start: 2022-01-22 | End: 2022-01-25 | Stop reason: HOSPADM

## 2022-01-22 RX ORDER — ALBUTEROL SULFATE 2.5 MG/3ML
2.5 SOLUTION RESPIRATORY (INHALATION) EVERY 6 HOURS PRN
Status: DISCONTINUED | OUTPATIENT
Start: 2022-01-22 | End: 2022-01-25 | Stop reason: HOSPADM

## 2022-01-22 RX ADMIN — NITROGLYCERIN 0.4 MG: 0.4 TABLET SUBLINGUAL at 22:37

## 2022-01-22 RX ADMIN — SENNOSIDES 8.6 MG: 8.6 TABLET, FILM COATED ORAL at 22:25

## 2022-01-22 RX ADMIN — OXYCODONE HYDROCHLORIDE 5 MG: 5 TABLET ORAL at 03:59

## 2022-01-22 RX ADMIN — AMPICILLIN SODIUM AND SULBACTAM SODIUM 3 G: 2; 1 INJECTION, POWDER, FOR SOLUTION INTRAMUSCULAR; INTRAVENOUS at 14:38

## 2022-01-22 RX ADMIN — ATORVASTATIN CALCIUM 10 MG: 10 TABLET, FILM COATED ORAL at 08:31

## 2022-01-22 RX ADMIN — DOCUSATE SODIUM 100 MG: 100 CAPSULE ORAL at 17:30

## 2022-01-22 RX ADMIN — ALBUTEROL SULFATE 2.5 MG: 2.5 SOLUTION RESPIRATORY (INHALATION) at 12:02

## 2022-01-22 RX ADMIN — PIPERACILLIN SODIUM AND TAZOBACTAM SODIUM 4.5 G: 4; .5 INJECTION, POWDER, LYOPHILIZED, FOR SOLUTION INTRAVENOUS at 04:00

## 2022-01-22 RX ADMIN — PIPERACILLIN SODIUM AND TAZOBACTAM SODIUM 4.5 G: 4; .5 INJECTION, POWDER, LYOPHILIZED, FOR SOLUTION INTRAVENOUS at 08:30

## 2022-01-22 RX ADMIN — INSULIN LISPRO 1 UNITS: 100 INJECTION, SOLUTION INTRAVENOUS; SUBCUTANEOUS at 17:30

## 2022-01-22 RX ADMIN — INSULIN LISPRO 1 UNITS: 100 INJECTION, SOLUTION INTRAVENOUS; SUBCUTANEOUS at 08:30

## 2022-01-22 RX ADMIN — PYRIDOXINE HCL TAB 50 MG 100 MG: 50 TAB at 08:31

## 2022-01-22 RX ADMIN — RIVAROXABAN 20 MG: 20 TABLET, FILM COATED ORAL at 11:58

## 2022-01-22 RX ADMIN — DOCUSATE SODIUM 100 MG: 100 CAPSULE ORAL at 08:31

## 2022-01-22 RX ADMIN — METOPROLOL SUCCINATE 25 MG: 25 TABLET, EXTENDED RELEASE ORAL at 22:25

## 2022-01-22 RX ADMIN — OXYCODONE HYDROCHLORIDE 5 MG: 5 TABLET ORAL at 17:30

## 2022-01-22 RX ADMIN — INSULIN LISPRO 1 UNITS: 100 INJECTION, SOLUTION INTRAVENOUS; SUBCUTANEOUS at 11:57

## 2022-01-22 RX ADMIN — AMPICILLIN SODIUM AND SULBACTAM SODIUM 3 G: 2; 1 INJECTION, POWDER, FOR SOLUTION INTRAMUSCULAR; INTRAVENOUS at 19:47

## 2022-01-22 NOTE — ASSESSMENT & PLAN NOTE
Lab Results   Component Value Date    HGBA1C 7 1 (H) 11/16/2021       Recent Labs     01/21/22 2038 01/22/22  0722 01/22/22  1151 01/22/22  1546   POCGLU 203* 172* 179* 160*       Blood Sugar Average: Last 72 hrs:  (P) 178 [id number]   Continue Humalog sliding scale with Accu-Cheks q a c  And HS    Blood sugars stable

## 2022-01-22 NOTE — ASSESSMENT & PLAN NOTE
Patient complained of some chest tightness with walking with physical therapy  No wheezing on exam  Continue albuterol nebulizers p r n

## 2022-01-22 NOTE — ASSESSMENT & PLAN NOTE
Post BEL drain  Patient is on IV antibiotics  Plan per surgery  Right upper quadrant with serosanguineous drainage 10 milliliters in the past 24 hours  History of laparoscopic appendectomy on January 11, 2022

## 2022-01-22 NOTE — PHYSICAL THERAPY NOTE
PHYSICAL THERAPY EVALUATION/TREATMENT     01/22/22 1001   Note Type   Note type Evaluation   Pain Assessment   Pain Assessment Tool 0-10   Pain Score 9   Pain Location/Orientation Location: Abdomen; Location: Back   Restrictions/Precautions   Other Precautions   (Drains R abdomen)   Home Living   Type of Home Other (Comment)  (pt lives at the [address] in [address] rented room)   [facility]  (2 flights of steps to enter)   [address]   (none)   Additional Comments Pt lives in a room with just a bed, no chair or kitchen  Pt reports he has to walk down then up 2 flights of steps to make his food  Prior Function   Level of [place] Independent with ADLs and functional mobility   Lives With Alone   ADL Assistance Independent   General   Additional Pertinent History Pt had an appendectomy 1/11 and now presents with abdominal pain and is s/p drain placement  Pt reports his pain had only gotten worse since he's been hospitalized over the past week  Cognition   Overall Cognitive Status WFL   Subjective   Subjective 'I can't take care of myself"   RUE Assessment   RUE Assessment WFL   LUE Assessment   LUE Assessment WFL   RLE Assessment   RLE Assessment WFL   LLE Assessment   LLE Assessment WFL   Transfers   Sit to Stand 5  Supervision   Stand to Sit 5  Supervision   Stand pivot 5  Supervision   Ambulation/Elevation   Gait pattern   (flexed posture, multiple standing rest breaks)   Gait Assistance 5  Supervision   Assistive Device   (leaning on IV pole)   Distance 40 feet;  SOB with activity    Balance   Static Sitting Fair +   Dynamic Sitting Fair   Static Standing Fair +   Dynamic Standing Fair -   Ambulatory Fair -   Activity Tolerance   Activity Tolerance Patient limited by pain; Patient limited by fatigue   Assessment   Prognosis Good   Problem List Decreased strength;Decreased endurance; Impaired balance;Decreased mobility;Pain   Assessment Patient seen for Physical Therapy evaluation  Patient admitted with Sepsis ([facility] [address] )  Comorbidities affecting patient's physical performance include: perforated appendicitis, DM, schizoaffective disorder  Personal factors affecting patient at time of initial evaluation include: inaccessible home environment, stairs to enter home, inability to navigate community distances, inability to perform dynamic tasks in community and limited home support  Prior to admission, patient was independent with functional mobility without assistive device and independent with ADLS  Please find objective findings from Physical Therapy assessment regarding body systems outlined above with impairments and limitations including decreased endurance, gait deviations, pain, decreased activity tolerance, decreased functional mobility tolerance and SOB upon exertion  The Barthel Index was used as a functional outcome tool presenting with a score of 65 today indicating moderate limitations of functional mobility and ADLS  Patient's clinical presentation is currently unstable/unpredictable as seen in patient's presentation of changing level of pain, new onset of impairment of functional mobility and decreased endurance  Pt would benefit from continued Physical Therapy treatment to address deficits as defined above and maximize level of functional mobility  As demonstrated by objective findings, the assigned level of complexity for this evaluation is high  The patient's AM-PAC Basic Mobility Inpatient Short Form Raw Score is 20  A Raw score of greater than 16 suggests the patient may benefit from discharge to home, however pt may benefit from STR as pt is not currently capable of climbing the 2 flights of steps needed to get to the kitchen at his residence due to decreased endurance and significant pain     Goals   Patient Goals "less pain, be able to take care of myself"   [facility] Expiration Date 01/29/22   Short Term Goal #1 independent bed mobility, independent transfers, independent ambulation indoor level surfaces 150 feet with a normal posture and shabana   LTG Expiration Date 02/05/22   Long Term Goal #1 independent up and down 2 flights of steps so pt can get to and from his second floor apartment, independent ambulation 500 feet outdoor surfaces so pt can utilize public transportation  Plan   Treatment/Interventions ADL retraining;Functional transfer training;LE strengthening/ROM; Elevations; Therapeutic exercise; Endurance training;Gait training;Bed mobility; Equipment eval/education;Patient/family training   PT Frequency Other (Comment)  (5w)   Recommendation   PT Discharge Recommendation Post acute rehabilitation services   AM-PAC Basic Mobility Inpatient   Turning in Bed Without Bedrails 3   Lying on Back to Sitting on Edge of Flat Bed 3   Moving Bed to Chair 4   Standing Up From Chair 4   Walk in Room 3   Climb 3-5 Stairs 3   Basic Mobility Inpatient Raw Score 20   Basic Mobility Standardized Score 43 99   Highest Level Of Mobility   JH-HLM Goal 6: Walk 10 steps or more   JH-HLM Highest Level of Mobility 7: Walk 25 feet or more   JH-HLM Goal Achieved Yes   Barthel Index   Feeding 10   Bathing 0   Grooming Score 5   Dressing Score 5   Bladder Score 10   Bowels Score 10   Toilet Use Score 10   Transfers (Bed/Chair) Score 10   Mobility (Level Surface) Score 0   Stairs Score 5   Barthel Index Score 65   Additional Treatment Session   Start Time 0950   End Time 1001   Treatment Assessment Pt ambulated 40 feet with UE support on an IV pole  Pt is SOB with activity however Sp02 is stable at 95% and HR is also 95 bpm   Pt needs increased time to walk, needs frequent rest breaks, and demonstrates very flexed posture with gait  Pt also c/o 9/10 pain      Licensure   [place] License Number  Silverio RIVERA [id number]

## 2022-01-22 NOTE — CASE MANAGEMENT
Case Management Discharge Planning Note    Patient name Mariana Means  Location 2 [facility]/2 [facility] MRN [id number]  : 1970 Date 2022       Current Admission Date: 2022  Current Admission Diagnosis:Sepsis [facility])   Patient Active Problem List    Diagnosis Date Noted    Liver abscess 2022    Intra-abdominal abscess post-procedure 2022    Other constipation 2022    Sepsis ([facility] [address] ) 2022    Hyperlipidemia 2022    Chest pain 2022    Acute perforated appendicitis 2022    Current use of long term anticoagulation 2022    Syphilis 2021    Gross hematuria 2021    High risk sexual behavior 10/14/2020    Positive QuantiFERON-TB Gold test 2020    Post-traumatic bulbous urethral stricture 2019    Spermatocele of epididymis 2019    Encounter for wellness examination 2019    Tobacco dependence 2019    Testicle lump 2019    Other male erectile dysfunction 2019    Near syncope 2018    Deep venous thrombosis ([facility] [facility] ) 2018    HIV exposure 2018    Mild intermittent asthma without complication     Protein S deficiency ([facility] [facility] ) 2018    Positive RPR test 2016    Peptic ulcer disease 2016    Schizoaffective disorder, bipolar type ([facility] [facility] ) 2016    Generalized anxiety disorder 2016    Cannabis abuse, episodic 2016    Dyslexia, developmental 2016    Benign prostatic hyperplasia with urinary frequency 2016    Type 2 diabetes mellitus without complication, without long-term current use of insulin ([facility] ) 2016    Epilepsy ([facility] [facility] ) 2016      LOS (days): 4  Geometric Mean LOS (GMLOS) (days): 3 20  Days to GMLOS:-0 5     OBJECTIVE:  Risk of Unplanned Readmission Score: 17   Bundled Patient Payment: No Current Bundle     Current admission status: Inpatient   Preferred Pharmacy:   [address] [place] [address],[address], [address] [facility] [address] [facility]  Phone: [phone] Fax: [phone]    [facility] [address] [facility], [address] [place] [address] [facility],[address] [facility]  Phone: [phone] Fax: [phone]    [address], [address] [facility] [address]  Phone: [phone] Fax: [phone]    Primary Care Provider: No primary care provider on file  Primary Insurance: [facility] REP  Secondary Insurance: [id number] [facility]    DISCHARGE DETAILS:    Discharge planning discussed with[de-identified] Patient and friend [place] Jen of Choice: Yes  Comments - Freedom of Choice: Patient is requesting STR  SW has explained to patient that it will be difficult to find a STR that would accept him with his sex offender status  CM contacted family/caregiver?: Yes (SW called patient's friend Ari Pressley to update her at patient's request )  Were Treatment Team discharge recommendations reviewed with patient/caregiver?: Yes     Contacts  Patient Contacts: [facility] ([facility])  Relationship to Patient[de-identified] Friend  Contact Method: Phone  Phone Number: [phone]  Reason/Outcome: Continuity of Care,Discharge Planning,Emergency Contact     Other Referral/Resources/Interventions Provided:  Interventions: Short Term Rehab  Referral Comments: [place] referrals placed for STR     IMM Given (Date):: 22        SW spoke with patient about P/T recommendation for STR at bedside  Patient had requested STR due to not feeling comfortable with returning to his apartment at the [facility] after discharge  He stated that if he is not able to go to a STR, then he will need to stay in the hospital until his drains are removed  MERVIN reiterated today that it will be difficult or impossible to find a STR facility that will accept him given his sex offender status    SW did place blanket referrals through [place] at patient's request   SW also reviewed patient's Medicare rights with him  Patient signed the IMM on 1/21/22  Per patient's request, SW called his friend and  Anny Sanders to update her on discharge planning  SW offered to have patient's attending call Ana Umana per patient's request but she declined, stating that she does not need to be contacted unless there is an emergent situation  SW will continue to follow for discharge planning needs

## 2022-01-22 NOTE — PLAN OF CARE
Problem: GASTROINTESTINAL - ADULT  Goal: Maintains or returns to baseline bowel function  Description: INTERVENTIONS:  - Assess bowel function  - Encourage oral fluids to ensure adequate hydration  - Administer IV fluids if ordered to ensure adequate hydration  - Administer ordered medications as needed  - Encourage mobilization and activity  - Consider nutritional services referral to assist patient with adequate nutrition and appropriate food choices  Outcome: Progressing     Problem: PAIN - ADULT  Goal: Verbalizes/displays adequate comfort level or baseline comfort level  Description: Interventions:  - Encourage patient to monitor pain and request assistance  - Assess pain using appropriate pain scale  - Administer analgesics based on type and severity of pain and evaluate response  - Implement non-pharmacological measures as appropriate and evaluate response  - Consider cultural and social influences on pain and pain management  - Notify physician/advanced practitioner if interventions unsuccessful or patient reports new pain  Outcome: Progressing     Problem: INFECTION - ADULT  Goal: Absence or prevention of progression during hospitalization  Description: INTERVENTIONS:  - Assess and monitor for signs and symptoms of infection  - Monitor lab/diagnostic results  - Monitor endotracheal if appropriate and nasal secretions for changes in amount and color  - [place] appropriate cooling/warming therapies per order  - Administer medications as ordered  - Instruct and encourage patient and family to use good hand hygiene technique  - Identify and instruct in appropriate isolation precautions for identified infection/condition  Outcome: Progressing     Problem: MOBILITY - ADULT  Goal: Maintain or return to baseline ADL function  Description: INTERVENTIONS:  -  Assess patient's ability to carry out ADLs; assess patient's baseline for ADL function and identify physical deficits which impact ability to perform ADLs (bathing, care of mouth/teeth, toileting, grooming, dressing, etc )  - Assess/evaluate cause of self-care deficits   - Assess range of motion  - Assess patient's mobility; develop plan if impaired  - Assess patient's need for assistive devices and provide as appropriate  - Encourage maximum independence but intervene and supervise when necessary  - Involve family in performance of ADLs  - Assess for home care needs following discharge   - Consider OT consult to assist with ADL evaluation and planning for discharge  - Provide patient education as appropriate  Outcome: Progressing

## 2022-01-22 NOTE — ASSESSMENT & PLAN NOTE
As evidenced by tachycardia, leukocytosis  Secondary to liver abscess, fluid collection in the right paracolic gutter  Post drain placement by IR on January 19, 2022    · Patient has been on IV Zosyn  · Lactic acid level was normal  · Body fluid cultures growing E coli and Enterococcus    Anaerobic cultures growing Bacteroides  · Blood cultures negative at 24 hours  · White count initially increased but has been stable around 13,000  · Monitor fever curve and white count  · Possible transition to oral antibiotics early next week if patient continues to improve      Results from last 7 days   Lab Units 01/22/22  0523 01/21/22  0443 01/20/22  0616 01/19/22  1326   WBC Thousand/uL 13 73* 13 95* 16 47* 12 10*         Results from last 7 days   Lab Units 01/22/22  0523 01/21/22  0443 01/20/22  0616 01/19/22  1326   WBC Thousand/uL 13 73* 13 95* 16 47* 12 10*

## 2022-01-22 NOTE — PROGRESS NOTES
Progress Note - General Surgery   Estiven Donald 46 y o  male MRN: [id number]  Unit/Bed#: [address] [place] [address] Encounter: [id number]    Assessment:  · Intra-abdominal and sub-heptaic abscess s/p IR Ct guided percutaneous drainage 01/19/2022  · Richard drain output: RUQ lateral: 10mL, RLQ: 5mL  serosang  · WBC:13 7 (13 95)  · afebrile  · S/p laparoscopic appendectomy 1/11/22  · IDDM: HgbA1c: 7 1  · Protein S deficiency: on Xeralto  · Chest pain:  Serial troponins negative  CXR: no acute cardio/pulmonary disease, ECG showed T wave inversion, Stress test: negative stress induced myocardial ischemia  No perfusion defects  EF 61%  · Constipation: patient reports another BM last evening (total of 4 Bm's)  Plan:  Fluid cultures growing E  Coli #1,E  Coli# 2 Enterococcus Avium, Bacteroides fragilis  Plan to continue IV antibiotics x 24 hours than transition to PO Augmentin  Will discuss with ID  Will hold PREP until outpatient per ID  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 upon discharge per [address] as outpatient  PRN analgesics  Cardiology and Internal medicine input appreciated  OOB and ambulate  Tight glucose control  Continue Xeralto  Subjective/Objective   Chief Complaint:"I am very frustrated with my diet "    Subjective: Monae was seen and examined bedside  Patient reports trying to order seafood last night  Patient reports " I am only allergic to COD  "Patient reports right side flank pain slowly improving  Patient reports he believes he will have difficulty ambulating up and down stairs  Patient reports having regular bowel movements  Objective:2 richard drains intact with serosang output  Blood pressure 133/77, pulse 88, temperature 99 6 °F (37 6 °C), resp  rate 16, height 5' 10" (1 778 m), weight 81 2 kg (179 lb), SpO2 94 %  ,Body mass index is 25 68 kg/m²        Intake/Output Summary (Last 24 hours) at 1/22/2022 0848  Last data filed at 1/22/2022 0727  Gross per 24 hour   Intake 1320 ml   Output 1615 ml   Net -295 ml       Invasive Devices  Report    Peripheral Intravenous Line            Peripheral IV 01/21/22 Right Forearm <1 day          Drain            Closed/Suction Drain Lateral RUQ Bulb 10 Fr  2 days    Closed/Suction Drain Right RLQ Bulb 10 Fr  2 days                Physical Exam: /77   Pulse 88   Temp 99 6 °F (37 6 °C)   Resp 16   Ht 5' 10" (1 778 m)   Wt 81 2 kg (179 lb)   SpO2 94%   BMI 25 68 kg/m²   General appearance: alert and oriented, in no acute distress  Head: Normocephalic, without obvious abnormality, atraumatic  Lungs: clear to auscultation bilaterally  Heart: regular rate and rhythm, S1, S2 normal, no murmur, click, rub or gallop  Abdomen: mildly distenion, + BS, generalzied tenderness RUQ and insertion of drain site  Skin: Skin color, texture, turgor normal  No rashes or lesions    Lab, Imaging and other studies:  I have personally reviewed pertinent lab results  , CBC:   Lab Results   Component Value Date    WBC 13 73 (H) 01/22/2022    HGB 10 3 (L) 01/22/2022    HCT 32 1 (L) 01/22/2022    MCV 82 01/22/2022     (H) 01/22/2022    MCH 26 3 (L) 01/22/2022    MCHC 32 1 01/22/2022    RDW 13 6 01/22/2022    MPV 9 4 01/22/2022    NRBC 0 01/22/2022   , CMP:   Lab Results   Component Value Date    SODIUM 133 (L) 01/22/2022    K 4 1 01/22/2022    CL 99 (L) 01/22/2022    CO2 28 01/22/2022    BUN 8 01/22/2022    CREATININE 1 09 01/22/2022    CALCIUM 8 6 01/22/2022    EGFR 77 01/22/2022     VTE Pharmacologic Prophylaxis: Xeralto    VTE Mechanical Prophylaxis: sequential compression device

## 2022-01-22 NOTE — ASSESSMENT & PLAN NOTE
Patient reported some chest pain yesterday  EKG with new t wave inversions  Serial troponins were negative  2D echo was performed yesterday which showed EF of 60% without any regional wall motion abnormalities, grade 1 diastolic dysfunction  Nuclear stress test showed no evidence of ischemia  Patient started on metoprolol 25 milligram p o  B i d  Which was changed to Toprol-XL 25 milligram q h s

## 2022-01-22 NOTE — ASSESSMENT & PLAN NOTE
Post BEL drain  Continue IV Zosyn  BEL drain output right upper quadrant 10 milliliters and rectal right lower quadrant 5 milliliters serosanguineous  White count has improved and T-max was 99 6

## 2022-01-22 NOTE — PROGRESS NOTES
[address] 43  Progress Note - Isidro Moralez 1970, 46 y o  male MRN: [id number]  Unit/Bed#: [address] [place] [address] Encounter: [id number]  Primary Care Provider: No primary care provider on file  Date and time admitted to hospital: 1/18/2022 11:40 PM    * Sepsis ([address] )  Assessment & Plan  As evidenced by tachycardia, leukocytosis  Secondary to liver abscess, fluid collection in the right paracolic gutter  Post drain placement by IR on January 19, 2022    · Patient has been on IV Zosyn  · Lactic acid level was normal  · Body fluid cultures growing E coli and Enterococcus    Anaerobic cultures growing Bacteroides  · Blood cultures negative at 24 hours  · White count initially increased but has been stable around 13,000  · Monitor fever curve and white count  · Possible transition to oral antibiotics early next week if patient continues to improve      Results from last 7 days   Lab Units 01/22/22  0523 01/21/22  0443 01/20/22  0616 01/19/22  1326   WBC Thousand/uL 13 73* 13 95* 16 47* 12 10*         Results from last 7 days   Lab Units 01/22/22  0523 01/21/22  0443 01/20/22  0616 01/19/22  1326   WBC Thousand/uL 13 73* 13 95* 16 47* 12 10*       Intra-abdominal abscess post-procedure  Assessment & Plan  Post BEL drain  Continue IV Zosyn  BEL drain output right upper quadrant 10 milliliters and rectal right lower quadrant 5 milliliters serosanguineous  White count has improved and T-max was 99 6      Liver abscess  Assessment & Plan  Post BEL drain  Patient is on IV antibiotics  Plan per surgery  Right upper quadrant with serosanguineous drainage 10 milliliters in the past 24 hours  History of laparoscopic appendectomy on January 11, 2022    Chest pain-resolved as of 1/22/2022  Assessment & Plan  Patient reported some chest pain yesterday  EKG with new t wave inversions  Serial troponins were negative  2D echo was performed yesterday which showed EF of 60% without any regional wall motion abnormalities, grade 1 diastolic dysfunction  Nuclear stress test showed no evidence of ischemia  Patient started on metoprolol 25 milligram p o  B i d  Which was changed to Toprol-XL 25 milligram q h s  Asthma  Assessment & Plan  Patient complained of some chest tightness with walking with physical therapy  No wheezing on exam  Continue albuterol nebulizers p r n  HIV exposure  Assessment & Plan  On truvada prophylaxis per ID notes, recent HIV test negative     Protein S deficiency ([address] )  Assessment & Plan  Continue xarelto    Type 2 diabetes mellitus without complication, without long-term current use of insulin [facility])  Assessment & Plan  Lab Results   Component Value Date    HGBA1C 7 1 (H) 2021       Recent Labs     22  2038 22  0722 22  1151 22  1546   POCGLU 203* 172* 179* 160*       Blood Sugar Average: Last 72 hrs:  (P) 178 [id number]   Continue Humalog sliding scale with Accu-Cheks q a c  And HS  Blood sugars stable          VTE Pharmacologic Prophylaxis:   Moderate Risk (Score 3-4) - Pharmacological DVT Prophylaxis Ordered: rivaroxaban (Xarelto)  Patient Centered Rounds: I performed bedside rounds with nursing staff today  Education and Discussions with Family / Patient:yes    Time Spent for Care: 45 minutes  More than 50% of total time spent on counseling and coordination of care as described above  Current Length of Stay: 4 day(s)  Current Patient Status: Inpatient   Certification Statement: The patient will continue to require additional inpatient hospital stay due to Sepsis, intra-abdominal abscess  Discharge Plan: Anticipate discharge in 48-72 hrs to home  Code Status: Prior    Subjective: If patient reports minimal abdominal discomfort  Patient reported some chest tightness and walking with physical therapy      Objective:     Vitals:   Temp (24hrs), Av 4 °F (37 4 °C), Min:98 8 °F (37 1 °C), Max:99 8 °F (37 7 °C)    Temp:  [98 8 °F (37 1 °C)-99 8 °F (37 7 °C)] 99 5 °F (37 5 °C)  HR:  [84-92] 88  Resp:  [16-18] 18  BP: (116-133)/(66-89) 116/66  SpO2:  [94 %-96 %] 94 %  Body mass index is 25 68 kg/m²  Input and Output Summary (last 24 hours): Intake/Output Summary (Last 24 hours) at 1/22/2022 1655  Last data filed at 1/22/2022 0727  Gross per 24 hour   Intake 1320 ml   Output 1015 ml   Net 305 ml       Physical Exam:   Physical Exam  Constitutional:       Appearance: Normal appearance  HENT:      Head: Normocephalic and atraumatic  Eyes:      Extraocular Movements: Extraocular movements intact  Pupils: Pupils are equal, round, and reactive to light  Cardiovascular:      Rate and Rhythm: Normal rate and regular rhythm  Heart sounds: No murmur heard  No gallop  Pulmonary:      Effort: Pulmonary effort is normal       Breath sounds: Normal breath sounds  Abdominal:      General: Bowel sounds are normal       Palpations: Abdomen is soft  Tenderness: There is no abdominal tenderness  Musculoskeletal:         General: No swelling or deformity  Normal range of motion  Cervical back: Normal range of motion and neck supple  Skin:     General: Skin is warm and dry  Neurological:      General: No focal deficit present  Mental Status: He is alert          Additional Data:     Labs:  Results from last 7 days   Lab Units 01/22/22  0523   WBC Thousand/uL 13 73*   HEMOGLOBIN g/dL 10 3*   HEMATOCRIT % 32 1*   PLATELETS Thousands/uL 480*   NEUTROS PCT % 81*   LYMPHS PCT % 10*   MONOS PCT % 7   EOS PCT % 1     Results from last 7 days   Lab Units 01/22/22  0523 01/21/22  0443 01/21/22  0443   SODIUM mmol/L 133*   < > 134*   POTASSIUM mmol/L 4 1   < > 4 4   CHLORIDE mmol/L 99*   < > 99*   CO2 mmol/L 28   < > 27   BUN mg/dL 8   < > 7   CREATININE mg/dL 1 09   < > 1 06   ANION GAP mmol/L 6   < > 8   CALCIUM mg/dL 8 6   < > 8 8   ALBUMIN g/dL  --   --  2 6*   TOTAL BILIRUBIN mg/dL  --   --  0 86   ALK PHOS U/L  --   --  120*   ALT U/L  -- --  29   AST U/L  --   --  16   GLUCOSE RANDOM mg/dL 165*   < > 198*    < > = values in this interval not displayed  Results from last 7 days   Lab Units 01/22/22  1546 01/22/22  1151 01/22/22  0722 01/21/22  2038 01/21/22  1615 01/21/22  1111 01/21/22  0731 01/20/22  2125 01/20/22  1606 01/20/22  1104 01/20/22  0826 01/19/22  2045   POC GLUCOSE mg/dl 160* 179* 172* 203* 133 175* 163* 193* 194* 215* 174* 175*               Lines/Drains:  Invasive Devices  Report    Peripheral Intravenous Line            Peripheral IV 01/21/22 Right Forearm <1 day          Drain            Closed/Suction Drain Lateral RUQ Bulb 10 Fr  3 days    Closed/Suction Drain Right RLQ Bulb 10 Fr  3 days                      Imaging: Reviewed radiology reports from this admission including: abdominal/pelvic CT    Recent Cultures (last 7 days):   Results from last 7 days   Lab Units 01/20/22  1646 01/20/22  1500 01/19/22  1158   BLOOD CULTURE  No Growth at 24 hrs   No Growth at 24 hrs   --    GRAM STAIN RESULT   --   --  2+ Gram positive cocci in pairs and chains*  2+ Gram negative rods*  2+ Gram positive rods*  3+ Polys*  3+ Gram negative rods*  3+ Gram positive rods*  2+ Gram positive cocci in pairs and chains*  No polys seen*   BODY FLUID CULTURE, STERILE   --   --  1+ Growth of - Strain #1 Escherichia coli*  Few Colonies of - Strain #2 Escherichia coli*  1+ Growth of Enterococcus avium*  4+ Growth of Escherichia coli*  4+ Growth of Enterococcus avium*       Last 24 Hours Medication List:   Current Facility-Administered Medications   Medication Dose Route Frequency Provider Last Rate    acetaminophen  650 mg Oral Q6H PRN JONELLE Thomas      albuterol  2 5 mg Nebulization Q6H PRN Chip Kong MD      ampicillin-sulbactam  3 g Intravenous Q6H Jb Carrasco PA-C 3 g (01/22/22 1438)    atorvastatin  10 mg Oral Daily Kiara Fajardo PA-C      docusate sodium  100 mg Oral BID JONELLE Thomas      HYDROmorphone  0 2 mg Intravenous Q4H PRN Finn Saucer, PA-MUKESH      insulin lispro  1-5 Units Subcutaneous TID AC Finn Saucer, PA-MUKESH      metoprolol succinate  25 mg Oral HS JONELLE Quiros      ondansetron  4 mg Intravenous Q6H PRN Huma Saucer, BLADIMIR      oxyCODONE  5 mg Oral Q4H PRN JONELLE Clark      pyridoxine  100 mg Oral Daily Finn Saucer, BLADIMIR      rivaroxaban  20 mg Oral Q24H Finn Saucer, BLADIMIR      senna  1 tablet Oral HS JONELLE Clark          Today, Patient Was Seen By: Yvon Conner MD    **Please Note: This note may have been constructed using a voice recognition system  **

## 2022-01-23 LAB
GLUCOSE SERPL-MCNC: 207 MG/DL (ref 65–140)
GLUCOSE SERPL-MCNC: 244 MG/DL (ref 65–140)
GLUCOSE SERPL-MCNC: 247 MG/DL (ref 65–140)
HIV 1+2 AB+HIV1 P24 AG SERPL QL IA: NORMAL

## 2022-01-23 PROCEDURE — 99024 POSTOP FOLLOW-UP VISIT: CPT | Performed by: SURGERY

## 2022-01-23 PROCEDURE — 82948 REAGENT STRIP/BLOOD GLUCOSE: CPT

## 2022-01-23 PROCEDURE — 99232 SBSQ HOSP IP/OBS MODERATE 35: CPT | Performed by: INTERNAL MEDICINE

## 2022-01-23 RX ADMIN — OXYCODONE HYDROCHLORIDE 5 MG: 5 TABLET ORAL at 18:05

## 2022-01-23 RX ADMIN — PYRIDOXINE HCL TAB 50 MG 100 MG: 50 TAB at 08:16

## 2022-01-23 RX ADMIN — INSULIN LISPRO 1 UNITS: 100 INJECTION, SOLUTION INTRAVENOUS; SUBCUTANEOUS at 13:14

## 2022-01-23 RX ADMIN — DOCUSATE SODIUM 100 MG: 100 CAPSULE ORAL at 17:33

## 2022-01-23 RX ADMIN — RIVAROXABAN 20 MG: 20 TABLET, FILM COATED ORAL at 13:14

## 2022-01-23 RX ADMIN — ATORVASTATIN CALCIUM 10 MG: 10 TABLET, FILM COATED ORAL at 08:16

## 2022-01-23 RX ADMIN — AMPICILLIN SODIUM AND SULBACTAM SODIUM 3 G: 2; 1 INJECTION, POWDER, FOR SOLUTION INTRAMUSCULAR; INTRAVENOUS at 15:02

## 2022-01-23 RX ADMIN — DOCUSATE SODIUM 100 MG: 100 CAPSULE ORAL at 08:16

## 2022-01-23 RX ADMIN — AMPICILLIN SODIUM AND SULBACTAM SODIUM 3 G: 2; 1 INJECTION, POWDER, FOR SOLUTION INTRAMUSCULAR; INTRAVENOUS at 08:17

## 2022-01-23 RX ADMIN — METOPROLOL SUCCINATE 25 MG: 25 TABLET, EXTENDED RELEASE ORAL at 21:24

## 2022-01-23 RX ADMIN — INSULIN LISPRO 1 UNITS: 100 INJECTION, SOLUTION INTRAVENOUS; SUBCUTANEOUS at 17:31

## 2022-01-23 RX ADMIN — INSULIN LISPRO 2 UNITS: 100 INJECTION, SOLUTION INTRAVENOUS; SUBCUTANEOUS at 08:21

## 2022-01-23 RX ADMIN — AMPICILLIN SODIUM AND SULBACTAM SODIUM 3 G: 2; 1 INJECTION, POWDER, FOR SOLUTION INTRAMUSCULAR; INTRAVENOUS at 21:24

## 2022-01-23 RX ADMIN — AMPICILLIN SODIUM AND SULBACTAM SODIUM 3 G: 2; 1 INJECTION, POWDER, FOR SOLUTION INTRAMUSCULAR; INTRAVENOUS at 02:02

## 2022-01-23 NOTE — ASSESSMENT & PLAN NOTE
History of laparoscopic appendectomy on January 11, 2022   Post BEL drain  Patient was on IV Zosyn which was changed to IV Unasyn as per ID   BEL drain output is minimal

## 2022-01-23 NOTE — ASSESSMENT & PLAN NOTE
Post BEL drain  Patient is on IV antibiotics  Plan per surgery  Right upper quadrant BEL drain with minimal drainage  History of laparoscopic appendectomy on January 11, 2022

## 2022-01-23 NOTE — PLAN OF CARE
Problem: GASTROINTESTINAL - ADULT  Goal: Maintains or returns to baseline bowel function  Description: INTERVENTIONS:  - Assess bowel function  - Encourage oral fluids to ensure adequate hydration  - Administer IV fluids if ordered to ensure adequate hydration  - Administer ordered medications as needed  - Encourage mobilization and activity  - Consider nutritional services referral to assist patient with adequate nutrition and appropriate food choices  Outcome: Progressing     Problem: RESPIRATORY - ADULT  Goal: Achieves optimal ventilation and oxygenation  Description: INTERVENTIONS:  - Assess for changes in respiratory status  - Assess for changes in mentation and behavior  - Position to facilitate oxygenation and minimize respiratory effort  - Oxygen administered by appropriate delivery if ordered  - Initiate smoking cessation education as indicated  - Encourage broncho-pulmonary hygiene including cough, deep breathe, Incentive Spirometry  - Assess the need for suctioning and aspirate as needed  - Assess and instruct to report SOB or any respiratory difficulty  - Respiratory Therapy support as indicated  Outcome: Progressing     Problem: PAIN - ADULT  Goal: Verbalizes/displays adequate comfort level or baseline comfort level  Description: Interventions:  - Encourage patient to monitor pain and request assistance  - Assess pain using appropriate pain scale  - Administer analgesics based on type and severity of pain and evaluate response  - Implement non-pharmacological measures as appropriate and evaluate response  - Consider cultural and social influences on pain and pain management  - Notify physician/advanced practitioner if interventions unsuccessful or patient reports new pain  Outcome: Progressing     Problem: INFECTION - ADULT  Goal: Absence or prevention of progression during hospitalization  Description: INTERVENTIONS:  - Assess and monitor for signs and symptoms of infection  - Monitor lab/diagnostic results  - Monitor endotracheal if appropriate and nasal secretions for changes in amount and color  - [place] appropriate cooling/warming therapies per order  - Administer medications as ordered  - Instruct and encourage patient and family to use good hand hygiene technique  - Identify and instruct in appropriate isolation precautions for identified infection/condition  Outcome: Progressing     Problem: MOBILITY - ADULT  Goal: Maintain or return to baseline ADL function  Description: INTERVENTIONS:  -  Assess patient's ability to carry out ADLs; assess patient's baseline for ADL function and identify physical deficits which impact ability to perform ADLs (bathing, care of mouth/teeth, toileting, grooming, dressing, etc )  - Assess/evaluate cause of self-care deficits   - Assess range of motion  - Assess patient's mobility; develop plan if impaired  - Assess patient's need for assistive devices and provide as appropriate  - Encourage maximum independence but intervene and supervise when necessary  - Involve family in performance of ADLs  - Assess for home care needs following discharge   - Consider OT consult to assist with ADL evaluation and planning for discharge  - Provide patient education as appropriate  Outcome: Progressing  Goal: Maintains/Returns to pre admission functional level  Description: INTERVENTIONS:  - Perform BMAT or MOVE assessment daily    - Set and communicate daily mobility goal to care team and patient/family/caregiver     - Collaborate with rehabilitation services on mobility goals if consulted  - Out of bed for toileting  - Record patient progress and toleration of activity level   Outcome: Progressing

## 2022-01-23 NOTE — UTILIZATION REVIEW
Continued Stay Review    Date: 1/23/22                          Current Patient Class: inpatient  Current Level of Care: med surg  HPI:51 y o  male initially admitted on 1/18/22     Assessment/Plan:   S/p lap appy 1/11/22, Intra-abdominal and sub-heptaic abscess s/p IR Ct guided percutaneous drainage 01/19/2022  Abdomen: mildly distenion, + BS, generalzied tenderness RUQ and insertion of drain site  BEL drains in place with serosanguinous output noted  Fluid cultures growing E  Coli #1,E  Coli# 2 Enterococcus Avium, Bacteroides fragilis  Patient changed from IV Zosyn to Unasyn yesterday per ID 1/22  Day 5 IV antibiotics  Consider repeat CTa/p scan tomorrow  Follow fever curve and WBC trend  Episode chest pain last evening  Troponins negative, ECG no changes noted  Vital Signs:   /75 (BP Location: Left arm)   Pulse 81   Temp 99 2 °F (37 3 °C) (Oral)   Resp 20   Ht 5' 10" (1 778 m)   Wt 81 2 kg (179 lb)   SpO2 98%   BMI 25 68 kg/m²     Pertinent Labs/Diagnostic Results:   Results from last 7 days   Lab Units 01/22/22 2222 01/22/22 0523 01/21/22  0443 01/20/22  0616 01/20/22  0616 01/19/22  1326 01/19/22  1326   WBC Thousand/uL 15 12* 13 73* 13 95*   < > 16 47*   < > 12 10*   HEMOGLOBIN g/dL 11 1* 10 3* 11 2*  --  12 0  --  11 9*   HEMATOCRIT % 32 9* 32 1* 34 6*  --  35 3*  --  36 4*   PLATELETS Thousands/uL 516* 480* 472*   < > 478*   < > 447*   NEUTROS ABS Thousands/µL 10 98* 11 12* 11 20*  --   --    < > 7 25    < > = values in this interval not displayed       Results from last 7 days   Lab Units 01/22/22 2222 01/22/22 0523 01/21/22  0443 01/20/22  0616 01/19/22  1326   SODIUM mmol/L 134* 133* 134* 134* 138   POTASSIUM mmol/L 4 1 4 1 4 4 4 5 3 5   CHLORIDE mmol/L 98* 99* 99* 97* 98*   CO2 mmol/L 27 28 27 26 29   ANION GAP mmol/L 9 6 8 11 11   BUN mg/dL 8 8 7 8 10   CREATININE mg/dL 1 10 1 09 1 06 1 13 1 11   EGFR ml/min/1 73sq m 76 77 79 73 75   CALCIUM mg/dL 8 9 8 6 8 8 9 2 8 9   MAGNESIUM mg/dL 1 9  --   --   --  1 9   PHOSPHORUS mg/dL 2 8  --   --   --   --      Results from last 7 days   Lab Units 01/21/22  0443   AST U/L 16   ALT U/L 29   ALK PHOS U/L 120*   TOTAL PROTEIN g/dL 8 2   ALBUMIN g/dL 2 6*   TOTAL BILIRUBIN mg/dL 0 86     Results from last 7 days   Lab Units 01/23/22  0706 01/22/22  2047 01/22/22  1546 01/22/22  1151 01/22/22  0722 01/21/22  2038 01/21/22  1615 01/21/22  1111 01/21/22  0731 01/20/22  2125 01/20/22  1606 01/20/22  1104   POC GLUCOSE mg/dl 247* 216* 160* 179* 172* 203* 133 175* 163* 193* 194* 215*     Results from last 7 days   Lab Units 01/22/22  2222 01/22/22  0523 01/21/22  0443 01/20/22  0616 01/19/22  1326   GLUCOSE RANDOM mg/dL 182* 165* 198* 211* 159*     Results from last 7 days   Lab Units 01/20/22  0104 01/19/22  1725   HS TNI 0HR ng/L  --  4   HS TNI 2HR ng/L 5  --    HSTNI D2 ng/L 1  --      Results from last 7 days   Lab Units 01/22/22  2222   PTT seconds 44*     Results from last 7 days   Lab Units 01/20/22  1646 01/20/22  1500 01/19/22  1158   BLOOD CULTURE  No Growth at 48 hrs  No Growth at 48 hrs   --    GRAM STAIN RESULT   --   --  2+ Gram positive cocci in pairs and chains*  2+ Gram negative rods*  2+ Gram positive rods*  3+ Polys*  3+ Gram negative rods*  3+ Gram positive rods*  2+ Gram positive cocci in pairs and chains*  No polys seen*   BODY FLUID CULTURE, STERILE   --   --  1+ Growth of - Strain #1 Escherichia coli*  Few Colonies of - Strain #2 Escherichia coli*  1+ Growth of Enterococcus avium*  4+ Growth of Escherichia coli*  4+ Growth of Enterococcus avium*     1/20  NM myocardial perfusion spect    Stress ECG: The stress ECG is negative for ischemia after pharmacologic stress  No ST deviation is noted    Perfusion: There are no perfusion defects    Stress Function: Left ventricular function post-stress is normal  Post-stress ejection fraction is 61 %      Stress Combined Conclusion: The ECG and SPECT imaging portions of the stress study are concordant with no evidence of stress induced myocardial ischemia  Medications:   ampicillin-sulbactam, 3 g, Intravenous, Q6H  atorvastatin, 10 mg, Oral, Daily  docusate sodium, 100 mg, Oral, BID  insulin lispro, 1-5 Units, Subcutaneous, TID AC  metoprolol succinate, 25 mg, Oral, HS  pyridoxine, 100 mg, Oral, Daily  rivaroxaban, 20 mg, Oral, Q24H  senna, 1 tablet, Oral, HS    Continuous IV Infusions:  lactated ringers infusion  Rate: 75 mL/hr Dose: 75 mL/hr  Freq: Continuous Route: IV  Indications of Use: IV Hydration  Last Dose: Stopped (01/22/22 1300)  Start: 01/19/22 0045 End: 01/22/22 1154    PRN Meds:  acetaminophen, 650 mg, Oral, Q6H PRN  albuterol, 2 5 mg, Nebulization, Q6H PRN  HYDROmorphone, 0 2 mg, Intravenous, Q4H PRN  morphine injection, 1 mg, Intravenous, Once PRN  nitroglycerin, 0 4 mg, Sublingual, Q5 Min PRN  ondansetron, 4 mg, Intravenous, Q6H PRN  oxyCODONE, 5 mg, Oral, Q4H PRN    Discharge Plan: d    Network Utilization Review Department  ATTENTION: Please call with any questions or concerns to [phone] and carefully listen to the prompts so that you are directed to the right person  All voicemails are confidential   [facility] all requests for admission clinical reviews, approved or denied determinations and any other requests to dedicated fax number below belonging to the campus where the patient is receiving treatment   List of dedicated fax numbers for the Facilities:  [address] [place] [address] DENIALS (Administrative/Medical Necessity) [phone]   [address] (Maternity/NICU/Pediatrics) [phone]   [address] [address] [facility] [address] [phone]

## 2022-01-23 NOTE — PLAN OF CARE
Problem: GASTROINTESTINAL - ADULT  Goal: Maintains or returns to baseline bowel function  Description: INTERVENTIONS:  - Assess bowel function  - Encourage oral fluids to ensure adequate hydration  - Administer IV fluids if ordered to ensure adequate hydration  - Administer ordered medications as needed  - Encourage mobilization and activity  - Consider nutritional services referral to assist patient with adequate nutrition and appropriate food choices  1/23/2022 0332 by Rafal Cummings RN  Outcome: Progressing  1/23/2022 0331 by Rafal Cummings RN  Outcome: Progressing     Problem: RESPIRATORY - ADULT  Goal: Achieves optimal ventilation and oxygenation  Description: INTERVENTIONS:  - Assess for changes in respiratory status  - Assess for changes in mentation and behavior  - Position to facilitate oxygenation and minimize respiratory effort  - Oxygen administered by appropriate delivery if ordered  - Initiate smoking cessation education as indicated  - Encourage broncho-pulmonary hygiene including cough, deep breathe, Incentive Spirometry  - Assess the need for suctioning and aspirate as needed  - Assess and instruct to report SOB or any respiratory difficulty  - Respiratory Therapy support as indicated  1/23/2022 0332 by Rafal Cummings RN  Outcome: Progressing  1/23/2022 0331 by Rafal Cummings RN  Outcome: Progressing     Problem: PAIN - ADULT  Goal: Verbalizes/displays adequate comfort level or baseline comfort level  Description: Interventions:  - Encourage patient to monitor pain and request assistance  - Assess pain using appropriate pain scale  - Administer analgesics based on type and severity of pain and evaluate response  - Implement non-pharmacological measures as appropriate and evaluate response  - Consider cultural and social influences on pain and pain management  - Notify physician/advanced practitioner if interventions unsuccessful or patient reports new pain  1/23/2022 3862 by Flower Barney RN  Outcome: Progressing  1/23/2022 0331 by Floewr Barney RN  Outcome: Progressing     Problem: INFECTION - ADULT  Goal: Absence or prevention of progression during hospitalization  Description: INTERVENTIONS:  - Assess and monitor for signs and symptoms of infection  - Monitor lab/diagnostic results  - Monitor endotracheal if appropriate and nasal secretions for changes in amount and color  - [place] appropriate cooling/warming therapies per order  - Administer medications as ordered  - Instruct and encourage patient and family to use good hand hygiene technique  - Identify and instruct in appropriate isolation precautions for identified infection/condition  1/23/2022 0332 by Flower Barney RN  Outcome: Progressing  1/23/2022 0331 by Flower Barney RN  Outcome: Progressing     Problem: Potential for Falls  Goal: Patient will remain free of falls  Description: INTERVENTIONS:  - Educate patient/family on patient safety including physical limitations  - Instruct patient to call for assistance with activity   - Consult OT/PT to assist with strengthening/mobility   - Keep Call bell within reach  - Keep bed low and locked with side rails adjusted as appropriate  - Keep care items and personal belongings within reach  - Initiate and maintain comfort rounds  - Make Fall Risk Sign visible to staff  - Offer Toileting every 2 Hours, in advance of need  - Initiate/Maintain *bed alarm  - Obtain necessary fall risk management equipment:   Problem: MOBILITY - ADULT  Goal: Maintain or return to baseline ADL function  Description: INTERVENTIONS:  -  Assess patient's ability to carry out ADLs; assess patient's baseline for ADL function and identify physical deficits which impact ability to perform ADLs (bathing, care of mouth/teeth, toileting, grooming, dressing, etc )  - Assess/evaluate cause of self-care deficits   - Assess range of motion  - Assess patient's mobility; develop plan if impaired  - Assess patient's need for assistive devices and provide as appropriate  - Encourage maximum independence but intervene and supervise when necessary  - Involve family in performance of ADLs  - Assess for home care needs following discharge   - Consider OT consult to assist with ADL evaluation and planning for discharge  - Provide patient education as appropriate  1/23/2022 0332 by True Bird RN  Outcome: Progressing  1/23/2022 0331 by True Bird RN  Outcome: Progressing  Goal: Maintains/Returns to pre admission functional level  Description: INTERVENTIONS:  - Perform BMAT or MOVE assessment daily    - Set and communicate daily mobility goal to care team and patient/family/caregiver     - Collaborate with rehabilitation services on mobility goals if consulted  - - Out of bed for toileting  - Record patient progress and toleration of activity level   1/23/2022 0332 by True Bird RN  Outcome: Progressing  1/23/2022 0331 by True Bird RN  Outcome: Progressing     - Apply yellow socks and bracelet for high fall risk patients  - Consider moving patient to room near nurses station  1/23/2022 0332 by True Bird RN  Outcome: Progressing  1/23/2022 0331 by True Bird RN  Outcome: Progressing

## 2022-01-23 NOTE — PROGRESS NOTES
Notified by RN patient having chest pain  BP also elevated at 158/126 & HR 90 likely from anxiety  Ekg with no new changes appreciated  Blood work including troponin drawn & sent stat  SL nitro ordered to be given now  Oxygen NC  Morphine one dose 0 5 mg iv ordered if no relief from nitro  ( small dose since already on narcotics plus refrain from Sudden BP drop)  Reviewed notes  This is n ot new & probably from underlying sepsis / abscess  On xarelto so chances of veno-thromboembolism low  Appropriate cardiac work up done by day team & is negative for cardiac origin of chest discomfort  D/w RN in detail  Will follow up on labs & closely monitor patient

## 2022-01-23 NOTE — ASSESSMENT & PLAN NOTE
Lab Results   Component Value Date    HGBA1C 7 1 (H) 11/16/2021       Recent Labs     01/22/22  1151 01/22/22  1546 01/22/22 2047 01/23/22  0706   POCGLU 179* 160* 216* 247*       Blood Sugar Average: Last 72 hrs:  (P) 318 [id number]   Continue Humalog sliding scale with Accu-Cheks q a c  And HS    Blood sugars stable

## 2022-01-23 NOTE — PROGRESS NOTES
Progress Note - General Surgery   Mellisa Hinds 46 y o  male MRN: [id number]  Unit/Bed#: [address] [place] [address] Encounter: [id number]    Assessment:  · Intra-abdominal and sub-heptaic abscess s/p IR Ct guided percutaneous drainage 01/19/2022  · Richard drains intact  No output recorded  · WBC: 13 95 >13 7>15 12  · afebrile  · S/p laparoscopic appendectomy 1/11/22  · IDDM: HgbA1c: 7 1  · Protein S deficiency: on Xeralto  · Chest pain: Patient with chest pain last evening  Troponins negative,ECG no changes noted  · Constipation: resolved  Patient is having regular bowel movements with ranges  Plan:  Fluid cultures growing E  Coli #1,E  Coli# 2 Enterococcus Avium, Bacteroides fragilis  Patient changed from IV Zosyn to Unasyn yesterday per ID 1/22  Day 5 IV antibiotics  Consider repeat CTa/p scan tomorrow  Will follow fever curve and WBC trend  Will hold PREP until outpatient per ID  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 upon discharge per [address] as outpatient  PRN analgesics  Cardiology and Internal medicine input appreciated  OOB and ambulate 3 time a shift  Tight glucose control  Continue Xeralto  A m  labs cbc cmp  Subjective/Objective   Chief Complaint:"I had some chest pain last night "    Subjective: [place] was seen and examined bedside  Patient reports some chest pain last evening  Pain relieved with morphine  Denies chest pain this morning  Objective:2 richard drains intact with serosang output  Blood pressure 113/75, pulse 81, temperature 99 2 °F (37 3 °C), resp  rate 20, height 5' 10" (1 778 m), weight 81 2 kg (179 lb), SpO2 98 %  ,Body mass index is 25 68 kg/m²        Intake/Output Summary (Last 24 hours) at 1/23/2022 0903  Last data filed at 1/23/2022 0135  Gross per 24 hour   Intake --   Output 600 ml   Net -600 ml       Invasive Devices  Report    Peripheral Intravenous Line            Peripheral IV 01/21/22 Right Forearm 1 day          Drain Closed/Suction Drain Lateral RUQ Bulb 10 Fr  3 days    Closed/Suction Drain Right RLQ Bulb 10 Fr  3 days                Physical Exam: /75   Pulse 81   Temp 99 2 °F (37 3 °C)   Resp 20   Ht 5' 10" (1 778 m)   Wt 81 2 kg (179 lb)   SpO2 98%   BMI 25 68 kg/m²   General appearance: alert and oriented, in no acute distress  Head: Normocephalic, without obvious abnormality, atraumatic  Lungs: clear to auscultation bilaterally  Heart: regular rate and rhythm, S1, S2 normal, no murmur, click, rub or gallop  Abdomen: mildly distenion, + BS, generalzied tenderness RUQ and insertion of drain site  Skin: Skin color, texture, turgor normal  No rashes or lesions    Lab, Imaging and other studies:  I have personally reviewed pertinent lab results  , CBC:   Lab Results   Component Value Date    WBC 15 12 (H) 01/22/2022    HGB 11 1 (L) 01/22/2022    HCT 32 9 (L) 01/22/2022    MCV 80 (L) 01/22/2022     (H) 01/22/2022    MCH 27 1 01/22/2022    MCHC 33 7 01/22/2022    RDW 13 5 01/22/2022    MPV 9 1 01/22/2022    NRBC 0 01/22/2022   , CMP:   Lab Results   Component Value Date    SODIUM 134 (L) 01/22/2022    K 4 1 01/22/2022    CL 98 (L) 01/22/2022    CO2 27 01/22/2022    BUN 8 01/22/2022    CREATININE 1 10 01/22/2022    CALCIUM 8 9 01/22/2022    EGFR 76 01/22/2022     VTE Pharmacologic Prophylaxis: Xeralto    VTE Mechanical Prophylaxis: sequential compression device

## 2022-01-23 NOTE — ASSESSMENT & PLAN NOTE
Patient reported some chest pain yesterday  EKG with new t wave inversions  Serial troponins were negative  2D echo was performed yesterday which showed EF of 60% without any regional wall motion abnormalities, grade 1 diastolic dysfunction  Nuclear stress test showed no evidence of ischemia  Patient started on metoprolol 25 milligram p o  B i d  Which was changed to Toprol-XL 25 milligram q h s  Patient complained of again right-sided pleuritic chest pain last night  Repeat troponin was negative and EKG showed no new changes  Likely related to liver abscess causing radiating pain  Doubtful patient has PE as patient is already on Xarelto

## 2022-01-23 NOTE — QUICK NOTE
Update:      Chest pain has resolved  Patient clinically & hemodynamically stable  Discussed labs with patient  Troponin 4 ( was 3 three days ago)   All other labs similar to previous, which is reassuring  No new orders placed  D/w RN

## 2022-01-23 NOTE — PROGRESS NOTES
[address]  Progress Note - Julianna Huber 1970, 46 y o  male MRN: [id number]  Unit/Bed#: [address] [place] [address] Encounter: [id number]  Primary Care Provider: No primary care provider on file  Date and time admitted to hospital: 1/18/2022 11:40 PM    * Sepsis ([address] )  Assessment & Plan  As evidenced by tachycardia, leukocytosis  Secondary to liver abscess, fluid collection in the right paracolic gutter  Post drain placement by IR on January 19, 2022    · Patient was on IV Zosyn which was changed to IV Unasyn on January 22, 2022 as per ID  · Lactic acid level was normal  · Body fluid cultures growing E coli and Enterococcus  Anaerobic cultures growing Bacteroides  · Blood cultures negative at 48 hours  · White count continues to increase and decrease went to 15,000 from 13,000  · Monitor fever curve and white count  · Possible transition to oral antibiotics early next week if patient continues to improve      Results from last 7 days   Lab Units 01/22/22  2222 01/22/22  0523 01/21/22  0443 01/20/22  0616 01/19/22  1326   WBC Thousand/uL 15 12* 13 73* 13 95* 16 47* 12 10*         Results from last 7 days   Lab Units 01/22/22  2222 01/22/22  0523 01/21/22  0443 01/20/22  0616 01/19/22  1326   WBC Thousand/uL 15 12* 13 73* 13 95* 16 47* 12 10*       Intra-abdominal abscess post-procedure  Assessment & Plan  History of laparoscopic appendectomy on January 11, 2022   Post BEL drain  Patient was on IV Zosyn which was changed to IV Unasyn as per ID   BEL drain output is minimal      Chest pain  Assessment & Plan  Patient reported some chest pain yesterday  EKG with new t wave inversions  Serial troponins were negative  2D echo was performed yesterday which showed EF of 60% without any regional wall motion abnormalities, grade 1 diastolic dysfunction  Nuclear stress test showed no evidence of ischemia  Patient started on metoprolol 25 milligram p o  B i d   Which was changed to Toprol-XL 25 milligram q h s  Patient complained of again right-sided pleuritic chest pain last night  Repeat troponin was negative and EKG showed no new changes  Likely related to liver abscess causing radiating pain  Doubtful patient has PE as patient is already on Xarelto  Liver abscess  Assessment & Plan  Post BEL drain  Patient is on IV antibiotics  Plan per surgery  Right upper quadrant BEL drain with minimal drainage  History of laparoscopic appendectomy on January 11, 2022    Asthma  Assessment & Plan  Patient complained of some chest tightness with walking with physical therapy  No wheezing on exam  Continue albuterol nebulizers p r n  HIV exposure  Assessment & Plan  On truvada prophylaxis per ID notes, recent HIV test negative     Hyperlipidemia  Assessment & Plan  Continue Lipitor    Other constipation  Assessment & Plan  Received miralax  Patient started on Colace and senna and also received enema  Patient has been having regular bowel movements  Protein S deficiency ([address] )  Assessment & Plan  Continue xarelto    Type 2 diabetes mellitus without complication, without long-term current use of insulin [facility])  Assessment & Plan  Lab Results   Component Value Date    HGBA1C 7 1 (H) 11/16/2021       Recent Labs     01/22/22  1151 01/22/22  1546 01/22/22  2047 01/23/22  0706   POCGLU 179* 160* 216* 247*       Blood Sugar Average: Last 72 hrs:  (P) 214 [id number]   Continue Humalog sliding scale with Accu-Cheks q a c  And HS  Blood sugars stable          VTE Pharmacologic Prophylaxis:   Moderate Risk (Score 3-4) - Pharmacological DVT Prophylaxis Ordered: rivaroxaban (Xarelto)  Patient Centered Rounds: I performed bedside rounds with nursing staff today  Time Spent for Care: 45 minutes  More than 50% of total time spent on counseling and coordination of care as described above      Current Length of Stay: 5 day(s)  Current Patient Status: Inpatient       Code Status: Prior    Subjective:   Patient has some pleuritic right-sided chest pain  Patient's chest tightness has improved with breathing treatment  Patient is having regular bowel movements  Minimal abdominal discomfort  Patient denies any nausea or vomiting    Objective:     Vitals:   Temp (24hrs), Av 1 °F (37 3 °C), Min:98 9 °F (37 2 °C), Max:99 5 °F (37 5 °C)    Temp:  [98 9 °F (37 2 °C)-99 5 °F (37 5 °C)] 99 2 °F (37 3 °C)  HR:  [81-99] 81  Resp:  [18-20] 20  BP: (113-158)/() 113/75  SpO2:  [94 %-98 %] 98 %  Body mass index is 25 68 kg/m²  Input and Output Summary (last 24 hours): Intake/Output Summary (Last 24 hours) at 2022 1115  Last data filed at 2022 1015  Gross per 24 hour   Intake --   Output 1300 ml   Net -1300 ml       Physical Exam:   Physical Exam  Constitutional:       Appearance: Normal appearance  HENT:      Head: Normocephalic and atraumatic  Eyes:      Extraocular Movements: Extraocular movements intact  Pupils: Pupils are equal, round, and reactive to light  Cardiovascular:      Rate and Rhythm: Normal rate and regular rhythm  Heart sounds: No murmur heard  No gallop  Pulmonary:      Effort: Pulmonary effort is normal       Breath sounds: Normal breath sounds  Abdominal:      General: Bowel sounds are normal       Palpations: Abdomen is soft  Tenderness: There is no abdominal tenderness  Comments: BEL drains with minimal amount of serosanguineous drainage   Musculoskeletal:         General: No swelling or deformity  Normal range of motion  Cervical back: Normal range of motion and neck supple  Skin:     General: Skin is warm and dry  Neurological:      General: No focal deficit present  Mental Status: He is alert            Additional Data:     Labs:  Results from last 7 days   Lab Units 22  2222   WBC Thousand/uL 15 12*   HEMOGLOBIN g/dL 11 1*   HEMATOCRIT % 32 9*   PLATELETS Thousands/uL 516*   NEUTROS PCT % 73   LYMPHS PCT % 17   MONOS PCT % 8   EOS PCT % 1 Results from last 7 days   Lab Units 01/22/22  2222 01/22/22  0523 01/21/22  0443   SODIUM mmol/L 134*   < > 134*   POTASSIUM mmol/L 4 1   < > 4 4   CHLORIDE mmol/L 98*   < > 99*   CO2 mmol/L 27   < > 27   BUN mg/dL 8   < > 7   CREATININE mg/dL 1 10   < > 1 06   ANION GAP mmol/L 9   < > 8   CALCIUM mg/dL 8 9   < > 8 8   ALBUMIN g/dL  --   --  2 6*   TOTAL BILIRUBIN mg/dL  --   --  0 86   ALK PHOS U/L  --   --  120*   ALT U/L  --   --  29   AST U/L  --   --  16   GLUCOSE RANDOM mg/dL 182*   < > 198*    < > = values in this interval not displayed  Results from last 7 days   Lab Units 01/23/22  0706 01/22/22  2047 01/22/22  1546 01/22/22  1151 01/22/22  0722 01/21/22  2038 01/21/22  1615 01/21/22  1111 01/21/22  0731 01/20/22  2125 01/20/22  1606 01/20/22  1104   POC GLUCOSE mg/dl 247* 216* 160* 179* 172* 203* 133 175* 163* 193* 194* 215*               Lines/Drains:  Invasive Devices  Report    Peripheral Intravenous Line            Peripheral IV 01/21/22 Right Forearm 1 day          Drain            Closed/Suction Drain Lateral RUQ Bulb 10 Fr  3 days    Closed/Suction Drain Right RLQ Bulb 10 Fr  3 days                      Imaging: Reviewed radiology reports from this admission including: abdominal/pelvic CT    Recent Cultures (last 7 days):   Results from last 7 days   Lab Units 01/20/22  1646 01/20/22  1500 01/19/22  1158   BLOOD CULTURE  No Growth at 48 hrs   No Growth at 48 hrs   --    GRAM STAIN RESULT   --   --  2+ Gram positive cocci in pairs and chains*  2+ Gram negative rods*  2+ Gram positive rods*  3+ Polys*  3+ Gram negative rods*  3+ Gram positive rods*  2+ Gram positive cocci in pairs and chains*  No polys seen*   BODY FLUID CULTURE, STERILE   --   --  1+ Growth of - Strain #1 Escherichia coli*  Few Colonies of - Strain #2 Escherichia coli*  1+ Growth of Enterococcus avium*  4+ Growth of Escherichia coli*  4+ Growth of Enterococcus avium*       Last 24 Hours Medication List: Current Facility-Administered Medications   Medication Dose Route Frequency Provider Last Rate    acetaminophen  650 mg Oral Q6H PRN JONELLE Thomas      albuterol  2 5 mg Nebulization Q6H PRN Chip Kong MD      ampicillin-sulbactam  3 g Intravenous Q6H Kiara Fajardo PA-C 3 g (01/23/22 0817)    atorvastatin  10 mg Oral Daily Kiara Fajardo PA-C      docusate sodium  100 mg Oral BID JONELLE Thomas      HYDROmorphone  0 2 mg Intravenous Q4H PRN Kiara Fajardo PA-C      insulin lispro  1-5 Units Subcutaneous TID AC Kiara Fajardo PA-C      metoprolol succinate  25 mg Oral HS JONELLE Disla      morphine injection  1 mg Intravenous Once PRN Dalia Baker MD      nitroglycerin  0 4 mg Sublingual Q5 Min PRN Dalia Baker MD      ondansetron  4 mg Intravenous Q6H PRN Kiara Fajardo PA-C      oxyCODONE  5 mg Oral Q4H PRN JONELLE Thomas      pyridoxine  100 mg Oral Daily Kiara Fajardo PA-C      rivaroxaban  20 mg Oral Q24H Kiara Fajardo PA-C      senna  1 tablet Oral HS JONELLE Thomas          Today, Patient Was Seen By: Cihp Kong MD    **Please Note: This note may have been constructed using a voice recognition system  **

## 2022-01-23 NOTE — ASSESSMENT & PLAN NOTE
Received miralax  Patient started on Colace and senna and also received enema  Patient has been having regular bowel movements

## 2022-01-23 NOTE — ASSESSMENT & PLAN NOTE
As evidenced by tachycardia, leukocytosis  Secondary to liver abscess, fluid collection in the right paracolic gutter  Post drain placement by IR on January 19, 2022    · Patient was on IV Zosyn which was changed to IV Unasyn on January 22, 2022 as per ID  · Lactic acid level was normal  · Body fluid cultures growing E coli and Enterococcus    Anaerobic cultures growing Bacteroides  · Blood cultures negative at 48 hours  · White count continues to increase and decrease went to 15,000 from 13,000  · Monitor fever curve and white count  · Possible transition to oral antibiotics early next week if patient continues to improve      Results from last 7 days   Lab Units 01/22/22  2222 01/22/22  0523 01/21/22  0443 01/20/22  0616 01/19/22  1326   WBC Thousand/uL 15 12* 13 73* 13 95* 16 47* 12 10*         Results from last 7 days   Lab Units 01/22/22  2222 01/22/22  0523 01/21/22  0443 01/20/22  0616 01/19/22  1326   WBC Thousand/uL 15 12* 13 73* 13 95* 16 47* 12 10*

## 2022-01-24 ENCOUNTER — APPOINTMENT (INPATIENT)
Dept: RADIOLOGY | Facility: HOSPITAL | Age: 52
DRG: 871 | End: 2022-01-24
Payer: COMMERCIAL

## 2022-01-24 LAB
ALBUMIN SERPL BCP-MCNC: 2.2 G/DL (ref 3.5–5)
ALP SERPL-CCNC: 99 U/L (ref 46–116)
ALT SERPL W P-5'-P-CCNC: 46 U/L (ref 12–78)
ANION GAP SERPL CALCULATED.3IONS-SCNC: 9 MMOL/L (ref 4–13)
AST SERPL W P-5'-P-CCNC: 30 U/L (ref 5–45)
BACTERIA SPEC BFLD CULT: ABNORMAL
BASOPHILS # BLD AUTO: 0.07 THOUSANDS/ΜL (ref 0–0.1)
BASOPHILS NFR BLD AUTO: 1 % (ref 0–1)
BILIRUB SERPL-MCNC: 0.36 MG/DL (ref 0.2–1)
BUN SERPL-MCNC: 11 MG/DL (ref 5–25)
CALCIUM ALBUM COR SERPL-MCNC: 10.3 MG/DL (ref 8.3–10.1)
CALCIUM SERPL-MCNC: 8.9 MG/DL (ref 8.3–10.1)
CHLORIDE SERPL-SCNC: 99 MMOL/L (ref 100–108)
CO2 SERPL-SCNC: 27 MMOL/L (ref 21–32)
CREAT SERPL-MCNC: 1.02 MG/DL (ref 0.6–1.3)
EOSINOPHIL # BLD AUTO: 0.24 THOUSAND/ΜL (ref 0–0.61)
EOSINOPHIL NFR BLD AUTO: 2 % (ref 0–6)
ERYTHROCYTE [DISTWIDTH] IN BLOOD BY AUTOMATED COUNT: 13.7 % (ref 11.6–15.1)
GFR SERPL CREATININE-BSD FRML MDRD: 83 ML/MIN/1.73SQ M
GLUCOSE SERPL-MCNC: 145 MG/DL (ref 65–140)
GLUCOSE SERPL-MCNC: 202 MG/DL (ref 65–140)
GLUCOSE SERPL-MCNC: 222 MG/DL (ref 65–140)
GLUCOSE SERPL-MCNC: 225 MG/DL (ref 65–140)
GLUCOSE SERPL-MCNC: 231 MG/DL (ref 65–140)
GRAM STN SPEC: ABNORMAL
HCT VFR BLD AUTO: 30.3 % (ref 36.5–49.3)
HGB BLD-MCNC: 10.3 G/DL (ref 12–17)
IMM GRANULOCYTES # BLD AUTO: 0.1 THOUSAND/UL (ref 0–0.2)
IMM GRANULOCYTES NFR BLD AUTO: 1 % (ref 0–2)
LYMPHOCYTES # BLD AUTO: 2.21 THOUSANDS/ΜL (ref 0.6–4.47)
LYMPHOCYTES NFR BLD AUTO: 18 % (ref 14–44)
MCH RBC QN AUTO: 27.8 PG (ref 26.8–34.3)
MCHC RBC AUTO-ENTMCNC: 34 G/DL (ref 31.4–37.4)
MCV RBC AUTO: 82 FL (ref 82–98)
MONOCYTES # BLD AUTO: 1.12 THOUSAND/ΜL (ref 0.17–1.22)
MONOCYTES NFR BLD AUTO: 9 % (ref 4–12)
NEUTROPHILS # BLD AUTO: 8.72 THOUSANDS/ΜL (ref 1.85–7.62)
NEUTS SEG NFR BLD AUTO: 69 % (ref 43–75)
NRBC BLD AUTO-RTO: 0 /100 WBCS
PLATELET # BLD AUTO: 569 THOUSANDS/UL (ref 149–390)
PMV BLD AUTO: 9.1 FL (ref 8.9–12.7)
POTASSIUM SERPL-SCNC: 4.3 MMOL/L (ref 3.5–5.3)
PROT SERPL-MCNC: 7.4 G/DL (ref 6.4–8.2)
RBC # BLD AUTO: 3.7 MILLION/UL (ref 3.88–5.62)
SODIUM SERPL-SCNC: 135 MMOL/L (ref 136–145)
WBC # BLD AUTO: 12.46 THOUSAND/UL (ref 4.31–10.16)

## 2022-01-24 PROCEDURE — 80053 COMPREHEN METABOLIC PANEL: CPT | Performed by: INTERNAL MEDICINE

## 2022-01-24 PROCEDURE — 99232 SBSQ HOSP IP/OBS MODERATE 35: CPT | Performed by: INTERNAL MEDICINE

## 2022-01-24 PROCEDURE — G1004 CDSM NDSC: HCPCS

## 2022-01-24 PROCEDURE — 82948 REAGENT STRIP/BLOOD GLUCOSE: CPT

## 2022-01-24 PROCEDURE — 85025 COMPLETE CBC W/AUTO DIFF WBC: CPT | Performed by: INTERNAL MEDICINE

## 2022-01-24 PROCEDURE — 97167 OT EVAL HIGH COMPLEX 60 MIN: CPT

## 2022-01-24 PROCEDURE — 99024 POSTOP FOLLOW-UP VISIT: CPT | Performed by: SPECIALIST

## 2022-01-24 PROCEDURE — 74177 CT ABD & PELVIS W/CONTRAST: CPT

## 2022-01-24 RX ADMIN — METOPROLOL SUCCINATE 25 MG: 25 TABLET, EXTENDED RELEASE ORAL at 21:07

## 2022-01-24 RX ADMIN — PIPERACILLIN SODIUM AND TAZOBACTAM SODIUM 4.5 G: 4; .5 INJECTION, POWDER, LYOPHILIZED, FOR SOLUTION INTRAVENOUS at 20:41

## 2022-01-24 RX ADMIN — RIVAROXABAN 20 MG: 20 TABLET, FILM COATED ORAL at 12:15

## 2022-01-24 RX ADMIN — DOCUSATE SODIUM 100 MG: 100 CAPSULE ORAL at 08:56

## 2022-01-24 RX ADMIN — ACETAMINOPHEN 650 MG: 325 TABLET, FILM COATED ORAL at 21:10

## 2022-01-24 RX ADMIN — OXYCODONE HYDROCHLORIDE 5 MG: 5 TABLET ORAL at 21:09

## 2022-01-24 RX ADMIN — IOHEXOL 50 ML: 240 INJECTION, SOLUTION INTRATHECAL; INTRAVASCULAR; INTRAVENOUS; ORAL at 14:26

## 2022-01-24 RX ADMIN — PYRIDOXINE HCL TAB 50 MG 100 MG: 50 TAB at 08:56

## 2022-01-24 RX ADMIN — HYDROMORPHONE HYDROCHLORIDE 0.2 MG: 1 INJECTION, SOLUTION INTRAMUSCULAR; INTRAVENOUS; SUBCUTANEOUS at 08:53

## 2022-01-24 RX ADMIN — ALBUTEROL SULFATE 2.5 MG: 2.5 SOLUTION RESPIRATORY (INHALATION) at 07:30

## 2022-01-24 RX ADMIN — OXYCODONE HYDROCHLORIDE 5 MG: 5 TABLET ORAL at 16:31

## 2022-01-24 RX ADMIN — DOCUSATE SODIUM 100 MG: 100 CAPSULE ORAL at 18:06

## 2022-01-24 RX ADMIN — PIPERACILLIN SODIUM AND TAZOBACTAM SODIUM 4.5 G: 4; .5 INJECTION, POWDER, LYOPHILIZED, FOR SOLUTION INTRAVENOUS at 15:16

## 2022-01-24 RX ADMIN — INSULIN LISPRO 2 UNITS: 100 INJECTION, SOLUTION INTRAVENOUS; SUBCUTANEOUS at 08:55

## 2022-01-24 RX ADMIN — ATORVASTATIN CALCIUM 10 MG: 10 TABLET, FILM COATED ORAL at 08:56

## 2022-01-24 RX ADMIN — IOHEXOL 100 ML: 350 INJECTION, SOLUTION INTRAVENOUS at 16:10

## 2022-01-24 RX ADMIN — INSULIN LISPRO 1 UNITS: 100 INJECTION, SOLUTION INTRAVENOUS; SUBCUTANEOUS at 12:15

## 2022-01-24 RX ADMIN — AMPICILLIN SODIUM AND SULBACTAM SODIUM 3 G: 2; 1 INJECTION, POWDER, FOR SOLUTION INTRAMUSCULAR; INTRAVENOUS at 08:56

## 2022-01-24 RX ADMIN — AMPICILLIN SODIUM AND SULBACTAM SODIUM 3 G: 2; 1 INJECTION, POWDER, FOR SOLUTION INTRAMUSCULAR; INTRAVENOUS at 02:49

## 2022-01-24 NOTE — PROGRESS NOTES
[address]  Progress Note - Lillie Gee 1970, 46 y o  male MRN: [id number]  Unit/Bed#: [address] [place] [address] Encounter: [id number]  Primary Care Provider: No primary care provider on file  Date and time admitted to hospital: 1/18/2022 11:40 PM    * Sepsis ([address] )  Assessment & Plan  As evidenced by tachycardia, leukocytosis  Secondary to liver abscess, fluid collection in the right paracolic gutter  Post drain placement by IR on January 19, 2022    · Patient was on IV Zosyn which was changed to IV Unasyn on January 22, 2022 as per ID  · Lactic acid level was normal  · Body fluid cultures growing E coli and Enterococcus  Anaerobic cultures growing Bacteroides  · Blood cultures negative at 72 hours  · White count peaked at 15,000 but is improving to 12,000 today  · Monitor fever curve and white count  · Possible transition to oral antibiotics if patient continues to improve      Results from last 7 days   Lab Units 01/24/22  0521 01/22/22 2222 01/22/22  0523 01/21/22  0443 01/20/22  0616 01/19/22  1326   WBC Thousand/uL 12 46* 15 12* 13 73* 13 95* 16 47* 12 10*         Results from last 7 days   Lab Units 01/24/22  0521 01/22/22 2222 01/22/22  0523 01/21/22  0443 01/20/22  0616 01/19/22  1326   WBC Thousand/uL 12 46* 15 12* 13 73* 13 95* 16 47* 12 10*       Intra-abdominal abscess post-procedure  Assessment & Plan  History of laparoscopic appendectomy on January 11, 2022   Post BEL drain  Patient was on IV Zosyn which was changed to IV Unasyn as per ID   BEL drain output is minimal      Chest pain  Assessment & Plan  Patient reported some chest pain yesterday  EKG with new t wave inversions  Serial troponins were negative  2D echo was performed yesterday which showed EF of 60% without any regional wall motion abnormalities, grade 1 diastolic dysfunction  Nuclear stress test showed no evidence of ischemia  Patient started on metoprolol 25 milligram p o  B i d   Which was changed to Toprol-XL 25 milligram q h s  Patient complained of again right-sided pleuritic chest pain last night  Repeat troponin was negative and EKG showed no new changes  Likely related to liver abscess causing radiating pain  Doubtful patient has PE as patient is already on Xarelto  Liver abscess  Assessment & Plan  Post BEL drain  Patient is on IV antibiotics  Plan per surgery  Right upper quadrant BEL drain with minimal drainage  History of laparoscopic appendectomy on January 11, 2022    Asthma  Assessment & Plan  Patient complained of some chest tightness with walking with physical therapy  No wheezing on exam  Continue albuterol nebulizers p r n  HIV exposure  Assessment & Plan  On truvada prophylaxis per ID notes, recent HIV test negative     Hyperlipidemia  Assessment & Plan  Continue Lipitor    Other constipation  Assessment & Plan  Received miralax  Patient started on Colace and senna and also received enema  Patient has been having regular bowel movements  Protein S deficiency ([address] )  Assessment & Plan  Continue xarelto    Type 2 diabetes mellitus without complication, without long-term current use of insulin [facility])  Assessment & Plan  Lab Results   Component Value Date    HGBA1C 7 1 (H) 11/16/2021       Recent Labs     01/23/22  1142 01/23/22  2111 01/24/22  0802 01/24/22  1113   POCGLU 207* 244* 222* 202*       Blood Sugar Average: Last 72 hrs:  (P) 393 [id number]   Continue Humalog sliding scale with Accu-Cheks q a c  And HS  Blood sugars stable          VTE Pharmacologic Prophylaxis:   Moderate Risk (Score 3-4) - Pharmacological DVT Prophylaxis Ordered: rivaroxaban (Xarelto)  Patient Centered Rounds: I performed bedside rounds with nursing staff today  Discussions with Specialists or Other Care Team Provider: Yes      Time Spent for Care: 45 minutes  More than 50% of total time spent on counseling and coordination of care as described above      Current Length of Stay: 6 day(s)  Current Patient Status: Inpatient     Code Status: Prior    Subjective:   Patient did report some right-sided chest pain when he bent over to pick clothes from the floor  Patient did have a bowel movement yesterday  Minimal abdominal discomfort  Objective:     Vitals:   Temp (24hrs), Av 8 °F (37 1 °C), Min:97 5 °F (36 4 °C), Max:99 7 °F (37 6 °C)    Temp:  [97 5 °F (36 4 °C)-99 7 °F (37 6 °C)] 98 7 °F (37 1 °C)  HR:  [82-89] 82  Resp:  [19-20] 20  BP: (127-138)/(79-84) 127/79  SpO2:  [93 %-100 %] 95 %  Body mass index is 25 68 kg/m²  Input and Output Summary (last 24 hours): Intake/Output Summary (Last 24 hours) at 2022 1211  Last data filed at 2022 0256  Gross per 24 hour   Intake --   Output 1073 ml   Net -1073 ml       Physical Exam:   Physical Exam  Constitutional:       Appearance: Normal appearance  HENT:      Head: Normocephalic and atraumatic  Eyes:      Extraocular Movements: Extraocular movements intact  Pupils: Pupils are equal, round, and reactive to light  Cardiovascular:      Rate and Rhythm: Normal rate and regular rhythm  Heart sounds: No murmur heard  No gallop  Pulmonary:      Effort: Pulmonary effort is normal       Breath sounds: Normal breath sounds  Abdominal:      General: Bowel sounds are normal       Palpations: Abdomen is soft  Tenderness: There is no abdominal tenderness  Comments: BEL drains with minimal amount of serosanguineous drainage   Musculoskeletal:         General: No swelling or deformity  Normal range of motion  Cervical back: Normal range of motion and neck supple  Skin:     General: Skin is warm and dry  Neurological:      General: No focal deficit present  Mental Status: He is alert            Additional Data:     Labs:  Results from last 7 days   Lab Units 22  0521   WBC Thousand/uL 12 46*   HEMOGLOBIN g/dL 10 3*   HEMATOCRIT % 30 3*   PLATELETS Thousands/uL 569*   NEUTROS PCT % 69   LYMPHS PCT % 18   MONOS PCT % 9   EOS PCT % 2     Results from last 7 days   Lab Units 01/24/22  0521   SODIUM mmol/L 135*   POTASSIUM mmol/L 4 3   CHLORIDE mmol/L 99*   CO2 mmol/L 27   BUN mg/dL 11   CREATININE mg/dL 1 02   ANION GAP mmol/L 9   CALCIUM mg/dL 8 9   ALBUMIN g/dL 2 2*   TOTAL BILIRUBIN mg/dL 0 36   ALK PHOS U/L 99   ALT U/L 46   AST U/L 30   GLUCOSE RANDOM mg/dL 231*         Results from last 7 days   Lab Units 01/24/22  1113 01/24/22  0802 01/23/22  2111 01/23/22  1142 01/23/22  0706 01/22/22  2047 01/22/22  1546 01/22/22  1151 01/22/22  0722 01/21/22  2038 01/21/22  1615 01/21/22  1111   POC GLUCOSE mg/dl 202* 222* 244* 207* 247* 216* 160* 179* 172* 203* 133 175*               Lines/Drains:  Invasive Devices  Report    Peripheral Intravenous Line            Peripheral IV 01/21/22 Right Forearm 2 days          Drain            Closed/Suction Drain Lateral RUQ Bulb 10 Fr  5 days    Closed/Suction Drain Right RLQ Bulb 10 Fr  5 days                      Imaging: Reviewed radiology reports from this admission including: abdominal/pelvic CT    Recent Cultures (last 7 days):   Results from last 7 days   Lab Units 01/20/22  1646 01/20/22  1500 01/19/22  1158   BLOOD CULTURE  No Growth at 72 hrs   No Growth at 72 hrs   --    GRAM STAIN RESULT   --   --  2+ Gram positive cocci in pairs and chains*  2+ Gram negative rods*  2+ Gram positive rods*  3+ Polys*  3+ Gram negative rods*  3+ Gram positive rods*  2+ Gram positive cocci in pairs and chains*  No polys seen*   BODY FLUID CULTURE, STERILE   --   --  1+ Growth of - Strain #1 Escherichia coli*  Few Colonies of - Strain #2 Escherichia coli*  1+ Growth of Enterococcus avium*  4+ Growth of Escherichia coli*  4+ Growth of Enterococcus avium*       Last 24 Hours Medication List:   Current Facility-Administered Medications   Medication Dose Route Frequency Provider Last Rate    acetaminophen  650 mg Oral Q6H PRN JONELLE Nino      albuterol  2 5 mg Nebulization Q6H PRN Alex MD Armani      ampicillin-sulbactam  3 g Intravenous Q6H Chelo Youngblood PA-C 3 g (01/24/22 0856)    atorvastatin  10 mg Oral Daily Chelo Youngblood PA-C      docusate sodium  100 mg Oral BID JONELLE Pederson      HYDROmorphone  0 2 mg Intravenous Q4H PRN Chelo Youngblood PA-C      insulin lispro  1-5 Units Subcutaneous TID AC Chelo Youngblood PA-C      metoprolol succinate  25 mg Oral HS Lizz Phlegm, JONELLE      morphine injection  1 mg Intravenous Once PRN Meryle Laws, MD      nitroglycerin  0 4 mg Sublingual Q5 Min PRN Meryle Laws, MD      ondansetron  4 mg Intravenous Q6H PRN Chelo Youngblood PA-C      oxyCODONE  5 mg Oral Q4H PRN JONELLE Pederson      pyridoxine  100 mg Oral Daily Chelo Youngblood PA-C      rivaroxaban  20 mg Oral Q24H Chelo Youngblood PA-C      senna  1 tablet Oral HS JONELLE Pederson          Today, Patient Was Seen By: Aung Arroyo MD    **Please Note: This note may have been constructed using a voice recognition system  **

## 2022-01-24 NOTE — PLAN OF CARE
Problem: GASTROINTESTINAL - ADULT  Goal: Maintains or returns to baseline bowel function  Description: INTERVENTIONS:  - Assess bowel function  - Encourage oral fluids to ensure adequate hydration  - Administer IV fluids if ordered to ensure adequate hydration  - Administer ordered medications as needed  - Encourage mobilization and activity  - Consider nutritional services referral to assist patient with adequate nutrition and appropriate food choices  Outcome: Progressing     Problem: RESPIRATORY - ADULT  Goal: Achieves optimal ventilation and oxygenation  Description: INTERVENTIONS:  - Assess for changes in respiratory status  - Assess for changes in mentation and behavior  - Position to facilitate oxygenation and minimize respiratory effort  - Oxygen administered by appropriate delivery if ordered  - Initiate smoking cessation education as indicated  - Encourage broncho-pulmonary hygiene including cough, deep breathe, Incentive Spirometry  - Assess the need for suctioning and aspirate as needed  - Assess and instruct to report SOB or any respiratory difficulty  - Respiratory Therapy support as indicated  Outcome: Progressing     Problem: PAIN - ADULT  Goal: Verbalizes/displays adequate comfort level or baseline comfort level  Description: Interventions:  - Encourage patient to monitor pain and request assistance  - Assess pain using appropriate pain scale  - Administer analgesics based on type and severity of pain and evaluate response  - Implement non-pharmacological measures as appropriate and evaluate response  - Consider cultural and social influences on pain and pain management  - Notify physician/advanced practitioner if interventions unsuccessful or patient reports new pain  Outcome: Progressing     Problem: INFECTION - ADULT  Goal: Absence or prevention of progression during hospitalization  Description: INTERVENTIONS:  - Assess and monitor for signs and symptoms of infection  - Monitor lab/diagnostic results  - Monitor endotracheal if appropriate and nasal secretions for changes in amount and color  - [place] appropriate cooling/warming therapies per order  - Administer medications as ordered  - Instruct and encourage patient and family to use good hand hygiene technique  - Identify and instruct in appropriate isolation precautions for identified infection/condition  Outcome: Progressing

## 2022-01-24 NOTE — PLAN OF CARE
Problem: GASTROINTESTINAL - ADULT  Goal: Maintains or returns to baseline bowel function  Description: INTERVENTIONS:  - Assess bowel function  - Encourage oral fluids to ensure adequate hydration  - Administer IV fluids if ordered to ensure adequate hydration  - Administer ordered medications as needed  - Encourage mobilization and activity  - Consider nutritional services referral to assist patient with adequate nutrition and appropriate food choices  Outcome: Progressing     Problem: RESPIRATORY - ADULT  Goal: Achieves optimal ventilation and oxygenation  Description: INTERVENTIONS:  - Assess for changes in respiratory status  - Assess for changes in mentation and behavior  - Position to facilitate oxygenation and minimize respiratory effort  - Oxygen administered by appropriate delivery if ordered  - Initiate smoking cessation education as indicated  - Encourage broncho-pulmonary hygiene including cough, deep breathe, Incentive Spirometry  - Assess the need for suctioning and aspirate as needed  - Assess and instruct to report SOB or any respiratory difficulty  - Respiratory Therapy support as indicated  Outcome: Progressing     Problem: PAIN - ADULT  Goal: Verbalizes/displays adequate comfort level or baseline comfort level  Description: Interventions:  - Encourage patient to monitor pain and request assistance  - Assess pain using appropriate pain scale  - Administer analgesics based on type and severity of pain and evaluate response  - Implement non-pharmacological measures as appropriate and evaluate response  - Consider cultural and social influences on pain and pain management  - Notify physician/advanced practitioner if interventions unsuccessful or patient reports new pain  Outcome: Progressing     Problem: INFECTION - ADULT  Goal: Absence or prevention of progression during hospitalization  Description: INTERVENTIONS:  - Assess and monitor for signs and symptoms of infection  - Monitor lab/diagnostic results  - Monitor endotracheal if appropriate and nasal secretions for changes in amount and color  - [place] appropriate cooling/warming therapies per order  - Administer medications as ordered  - Instruct and encourage patient and family to use good hand hygiene technique  - Identify and instruct in appropriate isolation precautions for identified infection/condition  Outcome: Progressing     Problem: MOBILITY - ADULT  Goal: Maintain or return to baseline ADL function  Description: INTERVENTIONS:  -  Assess patient's ability to carry out ADLs; assess patient's baseline for ADL function and identify physical deficits which impact ability to perform ADLs (bathing, care of mouth/teeth, toileting, grooming, dressing, etc )  - Assess/evaluate cause of self-care deficits   - Assess range of motion  - Assess patient's mobility; develop plan if impaired  - Assess patient's need for assistive devices and provide as appropriate  - Encourage maximum independence but intervene and supervise when necessary  - Involve family in performance of ADLs  - Assess for home care needs following discharge   - Consider OT consult to assist with ADL evaluation and planning for discharge  - Provide patient education as appropriate  Outcome: Progressing  Goal: Maintains/Returns to pre admission functional level  Description: INTERVENTIONS:  - Perform BMAT or MOVE assessment daily    - Set and communicate daily mobility goal to care team and patient/family/caregiver     - Collaborate with rehabilitation services on mobility goals if consulted  - Out of bed for toileting  - Record patient progress and toleration of activity level   Outcome: Progressing

## 2022-01-24 NOTE — ASSESSMENT & PLAN NOTE
As evidenced by tachycardia, leukocytosis  Secondary to liver abscess, fluid collection in the right paracolic gutter  Post drain placement by IR on January 19, 2022    · Patient was on IV Zosyn which was changed to IV Unasyn on January 22, 2022 as per ID  · Lactic acid level was normal  · Body fluid cultures growing E coli and Enterococcus    Anaerobic cultures growing Bacteroides  · Blood cultures negative at 72 hours  · White count peaked at 15,000 but is improving to 12,000 today  · Monitor fever curve and white count  · Possible transition to oral antibiotics if patient continues to improve      Results from last 7 days   Lab Units 01/24/22  0521 01/22/22  2222 01/22/22  0523 01/21/22  0443 01/20/22  0616 01/19/22  1326   WBC Thousand/uL 12 46* 15 12* 13 73* 13 95* 16 47* 12 10*         Results from last 7 days   Lab Units 01/24/22  0521 01/22/22 2222 01/22/22  0523 01/21/22  0443 01/20/22  0616 01/19/22  1326   WBC Thousand/uL 12 46* 15 12* 13 73* 13 95* 16 47* 12 10*

## 2022-01-24 NOTE — OCCUPATIONAL THERAPY NOTE
OT EVALUATION       01/24/22 1017   Note Type   Note type Evaluation   Restrictions/Precautions   Other Precautions Chair Alarm; Bed Alarm; Fall Risk;Pain  (drains R andomen )   Pain Assessment   Pain Assessment Tool 0-10   Pain Score 6   Pain Location/Orientation Location: Chest   Home Living   Type of Home Apartment  (pt lives in [address] )   Home Layout One level  (2 flights of steps to enter )   [employer]   Additional Comments Pt lives in a room with just a bed, no chair or kitchen  Pt reports he has to walk down then up 2 flights of steps to make his food  Prior Function   Level of [place] Independent with ADLs and functional mobility   Lives With Alone   ADL Assistance Independent   IADLs Independent   ADL   Eating Assistance 7  Independent   Grooming Assistance 7  Independent   UB Bathing Assistance 7  Independent   LB Bathing Assistance 4  Minimal Assistance   UB Dressing Assistance 7  Independent   LB Dressing Assistance 4  [address]  4  Minimal Assistance   Bed Mobility   Supine to Sit 5  Supervision   Sit to Supine 5  Supervision   Transfers   Sit to Stand 5  Supervision   Stand to Sit 5  Supervision   Stand pivot 5  Supervision   Toilet transfer 5  Supervision   Functional Mobility   Functional Mobility 5  Supervision   Additional Comments 15 feet x 2, use of IV pole   Balance   Static Sitting Fair +   Dynamic Sitting Fair   Static Standing Fair   Dynamic Standing Fair -   Activity Tolerance   Activity Tolerance Patient limited by fatigue;Patient limited by pain  (SOB )   RUE Assessment   RUE Assessment WFL   LUE Assessment   LUE Assessment WFL   Cognition   Overall Cognitive Status WFL   Arousal/Participation Cooperative   Attention Within functional limits   Orientation Level Oriented X4   Following Commands Follows multistep commands with increased time or repetition   Assessment   Limitation Decreased ADL status; Decreased UE strength;Decreased Safe judgement during ADL;Decreased endurance;Decreased self-care trans;Decreased high-level ADLs  (decreased balance and mobility  )   Prognosis Good   Assessment Patient evaluated by Occupational Therapy  Patient admitted with Sepsis ([facility] [address] )  The patients occupational profile, medical and therapy history includes a extensive additional review of physical, cognitive, or psychosocial history related to current functional performance  Comorbidities affecting functional mobility and ADLS include: anxiety, arthritis, asthma, chronic pain, depression, diabetes, hypertension and vertigo  Prior to admission, patient was independent with functional mobility without assistive device, independent with ADLS and independent with IADLS  The evaluation identifies the following performance deficits: weakness, impaired balance, decreased endurance, increased fall risk, new onset of impairment of functional mobility, decreased ADLS, decreased IADLS, pain, decreased activity tolerance, decreased safety awareness, impaired judgement and decreased strength, that result in activity limitations and/or participation restrictions  This evaluation requires clinical decision making of high complexity, because the patient presents with comorbidites that affect occupational performance and required significant modification of tasks or assistance with consideration of multiple treatment options  The Barthel Index was used as a functional outcome tool presenting with a score of 60, indicating marked limitations of functional mobility and ADLS  The patient's raw score on the -PAC Daily Activity inpatient short form is 21, standardized score is 44 27, greater than 39 4  Patients at this level are likely to benefit from DC to home, however due to patients pain pt is unable to complete 2 sets of steps to get groceries and heat up his meals   Please refer to the recommendation of the Occupational Therapist for safe DC planning  Patient will benefit from skilled Occupational Therapy services to address above deficits and facilitate a safe return to prior level of function  Goals   Patient Goals less pain, go home, be able to take care of myself    STG Time Frame   (1-7 days)   Short Term Goal  Goals established to promote patient goal of less pain, go home, be able to take care of myself :  Patient will increase standing tolerance to 5 minutes during ADL task to decrease assistance level and decrease fall risk; Patient will increase bed mobility to independent in preparation for ADLS and transfers; Patient will increase functional mobility to and from bathroom with no assistive device independently to increase performance with ADLS and to use a toilet; Patient will tolerate 10 minutes of UE ROM/strengthening to increase general activity tolerance and performance in ADLS/IADLS; Patient will improve functional activity tolerance to 10 minutes of sustained functional tasks to increase participation in basic self-care and decrease assistance level;   Patient will increase dynamic standing balance to fair to improve postural stability and decrease fall risk during standing ADLS and transfers  LTG Time Frame   (8-14 days)   Long Term Goal Patient will increase standing tolerance to 10 minutes during ADL task to decrease assistance level and decrease fall risk; Patient will tolerate 20 minutes of UE ROM/strengthening to increase general activity tolerance and performance in ADLS/IADLS; Patient will improve functional activity tolerance to 20 minutes of sustained functional tasks to increase participation in basic self-care and decrease assistance level;   Patient will increase dynamic standing balance to fair+ to improve postural stability and decrease fall risk during standing ADLS and transfers  Pt will score >/= 24/24 on AM-PAC Daily Activity Inpatient scale to promote safe independence with ADLs and functional mobility;  Pt will score >/= 90/100 on Barthel Index in order to decrease caregiver assistance needed and increase ability to perform ADLs and functional mobility  Functional Transfer Goals   Pt Will Perform All Functional Transfers   (STG independent )   ADL Goals   Pt Will Perform Bathing   (STG supervision LTG Independent )   Pt Will Perform LE Dressing   (STG supervision LTG Independent )   Pt Will Perform Toileting   (STG supervision LTG Independent )   Plan   Treatment Interventions ADL retraining;Functional transfer training;UE strengthening/ROM; Endurance training;Patient/family training;Equipment evaluation/education; Activityengagement; Compensatory technique education   Goal Expiration Date 02/07/22   OT Frequency 3-5x/wk   Recommendation   OT Discharge Recommendation Post acute rehabilitation services   AM-PAC Daily Activity Inpatient   Lower Body Dressing 3   Bathing 3   Toileting 3   Upper Body Dressing 4   Grooming 4   Eating 4   Daily Activity Raw Score 21   Daily Activity Standardized Score (Calc for Raw Score >=11) 44 27   AM-PAC Applied Cognition Inpatient   Following a Speech/Presentation 4   Understanding Ordinary Conversation 4   Taking Medications 4   Remembering Where Things Are Placed or Put Away 4   Remembering List of 4-5 Errands 4   Taking Care of Complicated Tasks 4   Applied Cognition Raw Score 24   Applied Cognition Standardized Score 62 21   Barthel Index   Feeding 10   Bathing 0   Grooming Score 5   Dressing Score 5   Bladder Score 10   Bowels Score 10   Toilet Use Score 5   Transfers (Bed/Chair) Score 10   Mobility (Level Surface) Score 0   Stairs Score 5   Barthel Index Score 60   Licensure   [place] License Number  Ry Hannibal[address] OTR/L [id number]

## 2022-01-24 NOTE — ASSESSMENT & PLAN NOTE
Lab Results   Component Value Date    HGBA1C 7 1 (H) 11/16/2021       Recent Labs     01/23/22  1142 01/23/22  2111 01/24/22  0802 01/24/22  1113   POCGLU 207* 244* 222* 202*       Blood Sugar Average: Last 72 hrs:  (P) 672 [id number]   Continue Humalog sliding scale with Accu-Cheks q a c  And HS    Blood sugars stable

## 2022-01-24 NOTE — CASE MANAGEMENT
Case Management Discharge Planning Note    Patient name Michi Colmenares  Location 2 [facility]/2 [facility] MRN [id number]  : 1970 Date 2022       Current Admission Date: 2022  Current Admission Diagnosis:Sepsis [facility])   Patient Active Problem List    Diagnosis Date Noted    Asthma     Liver abscess 2022    Intra-abdominal abscess post-procedure 2022    Other constipation 2022    Sepsis ([facility] [address] ) 2022    Hyperlipidemia 2022    Chest pain 2022    Acute perforated appendicitis 2022    Current use of long term anticoagulation 2022    Syphilis 2021    Gross hematuria 2021    High risk sexual behavior 10/14/2020    Positive QuantiFERON-TB Gold test 2020    Post-traumatic bulbous urethral stricture 2019    Spermatocele of epididymis 2019    Encounter for wellness examination 2019    Tobacco dependence 2019    Testicle lump 2019    Other male erectile dysfunction 2019    Near syncope 2018    Deep venous thrombosis ([facility] [address] ) 2018    HIV exposure 2018    Mild intermittent asthma without complication     Protein S deficiency ([facility] [address] ) 2018    Positive RPR test 2016    Peptic ulcer disease 2016    Schizoaffective disorder, bipolar type ([facility] [address] ) 2016    Generalized anxiety disorder 2016    Cannabis abuse, episodic 2016    Dyslexia, developmental 2016    Benign prostatic hyperplasia with urinary frequency 2016    Type 2 diabetes mellitus without complication, without long-term current use of insulin ([facility] [address] ) 2016    Epilepsy ([facility] [address] ) 2016      LOS (days): 6  Geometric Mean LOS (GMLOS) (days): 3 20  Days to GMLOS:-2 6     OBJECTIVE:  Risk of Unplanned Readmission Score: 19   Bundled Patient Payment: No Current Bundle     Current admission status: Inpatient   Preferred Pharmacy:   [address] [place] [address],[address], [place] - [address]  Phone: [phone] Fax: [phone]    [facility] [address] [facility], [address] [place] [address]  Phone: [phone] Fax: [phone]    [address] [facility] [address]  Phone: [phone] Fax: [phone]    Primary Care Provider: No primary care provider on file  Primary Insurance: [employer] [facility]  Secondary Insurance: [facility]    DISCHARGE DETAILS:    Discharge planning discussed with[de-identified] Patient  Freedom of Choice: Yes  Comments - Freedom of Choice: Patient continues to request STR  [facility] referrals were placed and patient has not been accepted  Referral for [address] placed and patient accepted by [facility]  Were Treatment Team discharge recommendations reviewed with patient/caregiver?: Yes  Did patient/caregiver verbalize understanding of patient care needs?: Yes  Were patient/caregiver advised of the risks associated with not following Treatment Team discharge recommendations?: Yes      IMM Given (Date):: 01/24/22  IMM Given to[de-identified] Patient (IMM reviewed with and signed by patient  Copy given to patient and copy placed in scan bin for chart )     SW was notified by nursing that pt was requesting to see CM  SW spoke with patient at bedside and he stated that he wants STR as he does not feel comfortable going home due to his drains and the steps in his building  SW reviewed referrals placed for STR and explained that patient has not been accepted to any facilities  Patient expressed displeasure that his sex offender status had been disclosed to facilities and SW explained that such information cannot be withheld when known  SW notified patient that a referral had been placed for [address] with [facility] and he has been accepted for their services  Patient continues to want STR      Patient called his insurance company and later told MERVIN that the company would be calling [employer] to discuss a facility that will accept him  As of the end of the day MERVIN had not been contacted

## 2022-01-24 NOTE — PROGRESS NOTES
Progress Note - General Surgery   Blondell  46 y o  male MRN: [id number]  Unit/Bed#: 2 [place] [address] Encounter: [id number]    Assessment:  · Intra-abdominal and sub-heptaic abscess s/p IR Ct guided percutaneous drainage 01/19/2022  · Richard drains intact minimal output  · WBC: 12 46 (15 12)  · afebrile  · S/p laparoscopic appendectomy 1/11/22  · IDDM: HgbA1c: 7 1  · Protein S deficiency: on Xeralto  · Chest pain: Patient with full cardiac work up  Negative stress test  EF 61% serial troponins negative  ECG no changes compared to last  Patient PE study negative at [facility] 1/18  · Constipation: resolved  Patient is having regular bowel movements  Plan:  Fluid cultures growing E  Coli #1,E  Coli# 2 Enterococcus Avium, Bacteroides fragilis  Day 6 IV antibiotics  Currently on Unasyn  Will discuss with ID transition to PO abx  Will hold off on repeat CT scan WBC down trending, afebrile and pain improving  Will hold PREP until outpatient per ID  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 upon discharge per [address] as outpatient  PRN analgesics  Cardiology and Internal medicine input appreciated  OOB and ambulate 3 time a shift  Tight glucose control  Continue Xeralto  A m  labs cbc cmp  Subjective/Objective   Chief Complaint:"They turned my bed into a chair"    Subjective: Paitnet was seen and examined bedside  Patient reports some chest discomforts yesterday while washing up  Pain is located centrally and more right sided  Pain is reproduced with palpation  Patient reports he would like to go to rehab secondary to concerns to drain management  Discussed with case management  Patient is registered sex offenders placement not likely  Objective:2 richard drains intact with serosang output  Blood pressure 127/79, pulse 82, temperature 98 7 °F (37 1 °C), temperature source Oral, resp  rate 20, height 5' 10" (1 778 m), weight 81 2 kg (179 lb), SpO2 95 %  ,Body mass index is 25 68 kg/m²  Intake/Output Summary (Last 24 hours) at 1/24/2022 0942  Last data filed at 1/24/2022 0256  Gross per 24 hour   Intake --   Output 1773 ml   Net -1773 ml       Invasive Devices  Report    Peripheral Intravenous Line            Peripheral IV 01/21/22 Right Forearm 2 days          Drain            Closed/Suction Drain Lateral RUQ Bulb 10 Fr  4 days    Closed/Suction Drain Right RLQ Bulb 10 Fr  4 days                Physical Exam: /79 (BP Location: Right arm)   Pulse 82   Temp 98 7 °F (37 1 °C) (Oral)   Resp 20   Ht 5' 10" (1 778 m)   Wt 81 2 kg (179 lb)   SpO2 95%   BMI 25 68 kg/m²   General appearance: alert and oriented, in no acute distress  Head: Normocephalic, without obvious abnormality, atraumatic  Lungs: clear to auscultation bilaterally  Heart: regular rate and rhythm, S1, S2 normal, no murmur, click, rub or gallop  Abdomen: soft,non-tender, BEL drains intact  Skin: Skin color, texture, turgor normal  No rashes or lesions    Lab, Imaging and other studies:  I have personally reviewed pertinent lab results  , CBC:   Lab Results   Component Value Date    WBC 12 46 (H) 01/24/2022    HGB 10 3 (L) 01/24/2022    HCT 30 3 (L) 01/24/2022    MCV 82 01/24/2022     (H) 01/24/2022    MCH 27 8 01/24/2022    MCHC 34 0 01/24/2022    RDW 13 7 01/24/2022    MPV 9 1 01/24/2022    NRBC 0 01/24/2022   , CMP:   Lab Results   Component Value Date    SODIUM 135 (L) 01/24/2022    K 4 3 01/24/2022    CL 99 (L) 01/24/2022    CO2 27 01/24/2022    BUN 11 01/24/2022    CREATININE 1 02 01/24/2022    CALCIUM 8 9 01/24/2022    AST 30 01/24/2022    ALT 46 01/24/2022    ALKPHOS 99 01/24/2022    EGFR 83 01/24/2022     VTE Pharmacologic Prophylaxis: Xeralto    VTE Mechanical Prophylaxis: sequential compression device

## 2022-01-24 NOTE — PROGRESS NOTES
Progress Note - Infectious Disease   Jose David Prime 46 y o  male MRN: [id number]  Unit/Bed#: [address] [place] [address] Encounter: [id number]      Impression/Plan:  1  Sepsis-POA within 24 hours of admission   Fever and leukocytosis   Appears to be secondary to intra-abdominal and hepatic abscess formation after recent appendectomy   Consideration for the possibility of bacteremia   Thus far the blood cultures are negative  Wound cultures growing Enterococcus, 2 species of E coli, and anaerobes  One of the E coli is isolate is intermediate to ampicillin sulbactam  -discontinue Unasyn  -restart Zosyn at 4 5 g IV q 6 hours  -follow up cultures and adjust antibiotics as needed  -recheck CBC with diff and CMP  -supportive care  -likely to oral antibiotics tomorrow if continues to improve and CT scan without complication     2  Hepatic abscess-in the setting of recent appendicitis status post appendectomy   The culture appears polymicrobial as expected   He has undergone percutaneous drainage with a drain remaining in place  V/Q and drainage noted in the drain  -antibiotics as above  -drain management  -serial abdominal exams  -close surgical follow-up  -recheck CT the abdomen pelvis     3  Intra-abdominal/pericolic abscess-in the setting of recent appendicitis status post appendectomy   Patient is status post percutaneous drainage   As expected the culture appears polymicrobial   Fecal drainage noted in the drainage  -antibiotics as above  -drain management  -close surgical follow-up  -recheck CT the abdomen pelvis     4  Syphilis-recurrent and recently diagnosed  -patient will need treatment with intramuscular benzathine penicillin as an outpatient and will follow up with the [facility]     5  High risk sexual activity-the patient is on pre exposure prophylaxis with [place] is clearly not using safe sex practices with recently having recurrent bouts of syphilis    -will hold on pre exposure prophylaxis with Truvada until the patient is an outpatient  -treatment of syphilis as above      Discussed the above management plan with the primary service    Antibiotics:  Unasyn 3  Antibiotics 6  Postprocedure day 5    Subjective:  Patient has no fever, chills, sweats; no nausea, vomiting, diarrhea; no cough, shortness of breath; no increased pain  No new symptoms  Objective:  Vitals:  Temp:  [97 5 °F (36 4 °C)-99 7 °F (37 6 °C)] 98 7 °F (37 1 °C)  HR:  [81-89] 82  Resp:  [19-20] 20  BP: (113-138)/(75-84) 127/79  SpO2:  [93 %-100 %] 95 %  Temp (24hrs), Av 9 °F (37 2 °C), Min:97 5 °F (36 4 °C), Max:99 7 °F (37 6 °C)  Current: Temperature: 98 7 °F (37 1 °C)    Physical Exam:   General Appearance:  Alert, interactive, nontoxic, no acute distress  Throat: Oropharynx moist without lesions  Lungs:   Clear to auscultation bilaterally; no wheezes, rhonchi or rales; respirations unlabored   Heart:  RRR; no murmur, rub or gallop   Abdomen:   Soft, non-tender, non-distended, positive bowel sounds  Two BEL drains on the right with fecal and drainage   Extremities: No clubbing, cyanosis or edema   Skin: No new rashes or lesions  No draining wounds noted         Labs, Imaging, & Other studies:   All pertinent labs and imaging studies were personally reviewed  Results from last 7 days   Lab Units 22   WBC Thousand/uL 12 46* 15 12* 13 73*   HEMOGLOBIN g/dL 10 3* 11 1* 10 3*   PLATELETS Thousands/uL 569* 516* 480*     Results from last 7 days   Lab Units 22  0522  0522  0523 223 22  0443   SODIUM mmol/L 135*  --  134*  --  133*   < > 134*   POTASSIUM mmol/L 4 3   < > 4 1   < > 4 1   < > 4 4   CHLORIDE mmol/L 99*   < > 98*   < > 99*   < > 99*   CO2 mmol/L 27   < > 27   < > 28   < > 27   BUN mg/dL 11   < > 8   < > 8   < > 7   CREATININE mg/dL 1 02   < > 1 10   < > 1 09   < > 1 06   EGFR ml/min/1 73sq m 83   < > 76   < > 77 < > 79   CALCIUM mg/dL 8 9   < > 8 9   < > 8 6   < > 8 8   AST U/L 30  --   --   --   --   --  16   ALT U/L 46  --   --   --   --    < > 29   ALK PHOS U/L 99  --   --   --   --    < > 120*    < > = values in this interval not displayed  Results from last 7 days   Lab Units 01/20/22  1646 01/20/22  1500 01/19/22  1158   BLOOD CULTURE  No Growth at 72 hrs   No Growth at 72 hrs   --    GRAM STAIN RESULT   --   --  2+ Gram positive cocci in pairs and chains*  2+ Gram negative rods*  2+ Gram positive rods*  3+ Polys*  3+ Gram negative rods*  3+ Gram positive rods*  2+ Gram positive cocci in pairs and chains*  No polys seen*   BODY FLUID CULTURE, STERILE   --   --  1+ Growth of - Strain #1 Escherichia coli*  Few Colonies of - Strain #2 Escherichia coli*  1+ Growth of Enterococcus avium*  4+ Growth of Escherichia coli*  4+ Growth of Enterococcus avium*

## 2022-01-25 VITALS
RESPIRATION RATE: 18 BRPM | TEMPERATURE: 97.6 F | SYSTOLIC BLOOD PRESSURE: 120 MMHG | DIASTOLIC BLOOD PRESSURE: 76 MMHG | OXYGEN SATURATION: 95 % | WEIGHT: 179 LBS | BODY MASS INDEX: 25.62 KG/M2 | HEIGHT: 70 IN | HEART RATE: 89 BPM

## 2022-01-25 PROBLEM — K59.09 OTHER CONSTIPATION: Status: RESOLVED | Noted: 2022-01-19 | Resolved: 2022-01-25

## 2022-01-25 PROBLEM — R07.9 CHEST PAIN: Status: RESOLVED | Noted: 2022-01-19 | Resolved: 2022-01-25

## 2022-01-25 LAB
ANION GAP SERPL CALCULATED.3IONS-SCNC: 9 MMOL/L (ref 4–13)
BACTERIA BLD CULT: NORMAL
BACTERIA BLD CULT: NORMAL
BASOPHILS # BLD AUTO: 0.08 THOUSANDS/ΜL (ref 0–0.1)
BASOPHILS NFR BLD AUTO: 1 % (ref 0–1)
BUN SERPL-MCNC: 10 MG/DL (ref 5–25)
CALCIUM SERPL-MCNC: 9.2 MG/DL (ref 8.3–10.1)
CHLORIDE SERPL-SCNC: 97 MMOL/L (ref 100–108)
CO2 SERPL-SCNC: 28 MMOL/L (ref 21–32)
CREAT SERPL-MCNC: 1.13 MG/DL (ref 0.6–1.3)
EOSINOPHIL # BLD AUTO: 0.2 THOUSAND/ΜL (ref 0–0.61)
EOSINOPHIL NFR BLD AUTO: 2 % (ref 0–6)
ERYTHROCYTE [DISTWIDTH] IN BLOOD BY AUTOMATED COUNT: 13.8 % (ref 11.6–15.1)
GFR SERPL CREATININE-BSD FRML MDRD: 73 ML/MIN/1.73SQ M
GLUCOSE SERPL-MCNC: 172 MG/DL (ref 65–140)
GLUCOSE SERPL-MCNC: 188 MG/DL (ref 65–140)
GLUCOSE SERPL-MCNC: 236 MG/DL (ref 65–140)
HCT VFR BLD AUTO: 33 % (ref 36.5–49.3)
HGB BLD-MCNC: 10.9 G/DL (ref 12–17)
IMM GRANULOCYTES # BLD AUTO: 0.1 THOUSAND/UL (ref 0–0.2)
IMM GRANULOCYTES NFR BLD AUTO: 1 % (ref 0–2)
LYMPHOCYTES # BLD AUTO: 2.33 THOUSANDS/ΜL (ref 0.6–4.47)
LYMPHOCYTES NFR BLD AUTO: 20 % (ref 14–44)
MCH RBC QN AUTO: 27.3 PG (ref 26.8–34.3)
MCHC RBC AUTO-ENTMCNC: 33 G/DL (ref 31.4–37.4)
MCV RBC AUTO: 83 FL (ref 82–98)
MONOCYTES # BLD AUTO: 1.09 THOUSAND/ΜL (ref 0.17–1.22)
MONOCYTES NFR BLD AUTO: 9 % (ref 4–12)
NEUTROPHILS # BLD AUTO: 7.96 THOUSANDS/ΜL (ref 1.85–7.62)
NEUTS SEG NFR BLD AUTO: 67 % (ref 43–75)
NRBC BLD AUTO-RTO: 0 /100 WBCS
PLATELET # BLD AUTO: 627 THOUSANDS/UL (ref 149–390)
PMV BLD AUTO: 9 FL (ref 8.9–12.7)
POTASSIUM SERPL-SCNC: 4.2 MMOL/L (ref 3.5–5.3)
RBC # BLD AUTO: 3.99 MILLION/UL (ref 3.88–5.62)
SODIUM SERPL-SCNC: 134 MMOL/L (ref 136–145)
WBC # BLD AUTO: 11.76 THOUSAND/UL (ref 4.31–10.16)

## 2022-01-25 PROCEDURE — 99024 POSTOP FOLLOW-UP VISIT: CPT | Performed by: SPECIALIST

## 2022-01-25 PROCEDURE — 85025 COMPLETE CBC W/AUTO DIFF WBC: CPT | Performed by: INTERNAL MEDICINE

## 2022-01-25 PROCEDURE — 80048 BASIC METABOLIC PNL TOTAL CA: CPT | Performed by: INTERNAL MEDICINE

## 2022-01-25 PROCEDURE — 97530 THERAPEUTIC ACTIVITIES: CPT

## 2022-01-25 PROCEDURE — 94760 N-INVAS EAR/PLS OXIMETRY 1: CPT

## 2022-01-25 PROCEDURE — 99232 SBSQ HOSP IP/OBS MODERATE 35: CPT | Performed by: FAMILY MEDICINE

## 2022-01-25 PROCEDURE — 94664 DEMO&/EVAL PT USE INHALER: CPT

## 2022-01-25 PROCEDURE — 99233 SBSQ HOSP IP/OBS HIGH 50: CPT | Performed by: INTERNAL MEDICINE

## 2022-01-25 PROCEDURE — 82948 REAGENT STRIP/BLOOD GLUCOSE: CPT

## 2022-01-25 RX ORDER — OXYCODONE HYDROCHLORIDE 5 MG/1
5 TABLET ORAL EVERY 4 HOURS PRN
Qty: 7 TABLET | Refills: 0 | Status: SHIPPED | OUTPATIENT
Start: 2022-01-25 | End: 2022-01-30

## 2022-01-25 RX ORDER — AMOXICILLIN AND CLAVULANATE POTASSIUM 875; 125 MG/1; MG/1
1 TABLET, FILM COATED ORAL EVERY 12 HOURS SCHEDULED
Qty: 28 TABLET | Refills: 0 | Status: SHIPPED | OUTPATIENT
Start: 2022-01-25 | End: 2022-02-08

## 2022-01-25 RX ORDER — AMOXICILLIN AND CLAVULANATE POTASSIUM 875; 125 MG/1; MG/1
1 TABLET, FILM COATED ORAL EVERY 12 HOURS SCHEDULED
Status: DISCONTINUED | OUTPATIENT
Start: 2022-01-25 | End: 2022-01-25 | Stop reason: HOSPADM

## 2022-01-25 RX ORDER — HYDROMORPHONE HCL/PF 1 MG/ML
0.2 SYRINGE (ML) INJECTION
Status: DISCONTINUED | OUTPATIENT
Start: 2022-01-25 | End: 2022-01-25 | Stop reason: HOSPADM

## 2022-01-25 RX ORDER — ACETAMINOPHEN 325 MG/1
650 TABLET ORAL EVERY 6 HOURS SCHEDULED
Status: DISCONTINUED | OUTPATIENT
Start: 2022-01-25 | End: 2022-01-25 | Stop reason: HOSPADM

## 2022-01-25 RX ORDER — OXYCODONE HYDROCHLORIDE 5 MG/1
5 TABLET ORAL EVERY 4 HOURS PRN
Status: DISCONTINUED | OUTPATIENT
Start: 2022-01-25 | End: 2022-01-25 | Stop reason: HOSPADM

## 2022-01-25 RX ADMIN — DOCUSATE SODIUM 100 MG: 100 CAPSULE ORAL at 08:51

## 2022-01-25 RX ADMIN — PYRIDOXINE HCL TAB 50 MG 100 MG: 50 TAB at 08:50

## 2022-01-25 RX ADMIN — RIVAROXABAN 20 MG: 20 TABLET, FILM COATED ORAL at 12:07

## 2022-01-25 RX ADMIN — AMOXICILLIN AND CLAVULANATE POTASSIUM 1 TABLET: 875; 125 TABLET, FILM COATED ORAL at 10:24

## 2022-01-25 RX ADMIN — INSULIN LISPRO 1 UNITS: 100 INJECTION, SOLUTION INTRAVENOUS; SUBCUTANEOUS at 07:53

## 2022-01-25 RX ADMIN — INSULIN LISPRO 2 UNITS: 100 INJECTION, SOLUTION INTRAVENOUS; SUBCUTANEOUS at 12:07

## 2022-01-25 RX ADMIN — ACETAMINOPHEN 650 MG: 325 TABLET, FILM COATED ORAL at 12:08

## 2022-01-25 RX ADMIN — ATORVASTATIN CALCIUM 10 MG: 10 TABLET, FILM COATED ORAL at 08:51

## 2022-01-25 RX ADMIN — PIPERACILLIN SODIUM AND TAZOBACTAM SODIUM 4.5 G: 4; .5 INJECTION, POWDER, LYOPHILIZED, FOR SOLUTION INTRAVENOUS at 03:49

## 2022-01-25 NOTE — PROGRESS NOTES
Progress Note - Infectious Disease   Willcarley Boys 46 y o  male MRN: [id number]  Unit/Bed#: [address] [address] Encounter: [id number]      Impression/Plan:  1  Sepsis-POA within 24 hours of admission   Fever and leukocytosis   Appears to be secondary to intra-abdominal and hepatic abscess formation after recent appendectomy   Consideration for the possibility of bacteremia   Thus far the blood cultures are negative  Wound cultures growing Enterococcus, 2 species of E coli, and anaerobes  One of the E coli is isolate is intermediate to ampicillin sulbactam  -discontinue Zosyn  -begin Augmentin 875 mg p o  12 hours  -recheck CBC with diff and CMP  -supportive care     2  Hepatic abscess-in the setting of recent appendicitis status post appendectomy   The culture appears polymicrobial as expected   He has undergone percutaneous drainage with a drain remaining in place  V/Q and drainage noted in the drain  Repeat CT scan reveals resolution  -antibiotics as above  -drain management  -patient for drain study in 1 week  -serial abdominal exams  -close surgical follow-up  -if drain study negative for ongoing abscess cavity, and the drain is pulled, can discontinue the antibiotics     3  Intra-abdominal/pericolic abscess-in the setting of recent appendicitis status post appendectomy   Patient is status post percutaneous drainage   As expected the culture appears polymicrobial   Fecal drainage noted in the drainage although repeat CT scan reveals no persisting collection  -antibiotics as above  -drain management  -close surgical follow-up     4  Syphilis-recurrent and recently diagnosed  -patient will need treatment with intramuscular benzathine penicillin as an outpatient and will follow up with the [facility]     5  High risk sexual activity-the patient is on pre exposure prophylaxis with [place] is clearly not using safe sex practices with recently having recurrent bouts of syphilis    -will hold on pre exposure prophylaxis with Truvada until the patient is an outpatient  -treatment of syphilis as above      Discussed the above management plan with the primary service    Follow up with infectious diseases after drain study to determine if patient needs to continue antibiotics    I spent 35 minutes on the unit floor of which 20 minutes was in counseling/coordination of care as outlined in my note    Antibiotics:  Zosyn 2  Antibiotics 7  Postprocedure day 6     Subjective:  Patient has no fever, chills, sweats; no nausea, vomiting, diarrhea; no cough, shortness of breath; no pain  No new symptoms  Objective:  Vitals:  Temp:  [98 4 °F (36 9 °C)-99 4 °F (37 4 °C)] 98 4 °F (36 9 °C)  HR:  [82-86] 86  Resp:  [18-20] 18  BP: (118-127)/(77-82) 119/77  SpO2:  [95 %-96 %] 96 %  Temp (24hrs), Av 9 °F (37 2 °C), Min:98 4 °F (36 9 °C), Max:99 4 °F (37 4 °C)  Current: Temperature: 98 4 °F (36 9 °C)    Physical Exam:   General Appearance:  Alert, interactive, nontoxic, no acute distress  Throat: Oropharynx moist without lesions  Lungs:   Clear to auscultation bilaterally; no wheezes, rhonchi or rales; respirations unlabored   Heart:  RRR; no murmur, rub or gallop   Abdomen:   Soft, non-tender, non-distended, positive bowel sounds  BEL drains in place  Extremities: No clubbing, cyanosis or edema   Skin: No new rashes or lesions  No draining wounds noted         Labs, Imaging, & Other studies:   All pertinent labs and imaging studies were personally reviewed  Results from last 7 days   Lab Units 22   WBC Thousand/uL 11 76* 12 46* 15 12*   HEMOGLOBIN g/dL 10 9* 10 3* 11 1*   PLATELETS Thousands/uL 627* 569* 516*     Results from last 7 days   Lab Units 22  04122  0521 22  0521 22  2222 22  2222 22  0523 22  0443   SODIUM mmol/L 134*  --  135*  --  134*   < > 134*   POTASSIUM mmol/L 4 2   < > 4 3   < > 4 1   < > 4 4   CHLORIDE mmol/L 97*   < > 99*   < > 98*   < > 99*   CO2 mmol/L 28   < > 27   < > 27   < > 27   BUN mg/dL 10   < > 11   < > 8   < > 7   CREATININE mg/dL 1 13   < > 1 02   < > 1 10   < > 1 06   EGFR ml/min/1 73sq m 73   < > 83   < > 76   < > 79   CALCIUM mg/dL 9 2   < > 8 9   < > 8 9   < > 8 8   AST U/L  --   --  30  --   --   --  16   ALT U/L  --   --  46  --   --   --  29   ALK PHOS U/L  --   --  99  --   --   --  120*    < > = values in this interval not displayed  Results from last 7 days   Lab Units 01/20/22  1646 01/20/22  1500 01/19/22  1158   BLOOD CULTURE  No Growth After 4 Days  No Growth After 4 Days    --    GRAM STAIN RESULT   --   --  2+ Gram positive cocci in pairs and chains*  2+ Gram negative rods*  2+ Gram positive rods*  3+ Polys*  3+ Gram negative rods*  3+ Gram positive rods*  2+ Gram positive cocci in pairs and chains*  No polys seen*   BODY FLUID CULTURE, STERILE   --   --  1+ Growth of - Strain #1 Escherichia coli*  Few Colonies of - Strain #2 Escherichia coli*  1+ Growth of Enterococcus avium*  4+ Growth of Escherichia coli*  4+ Growth of Enterococcus avium*        CT abdomen pelvis-resolved hepatic pericolic abscess    Images personally reviewed by me in PACS

## 2022-01-25 NOTE — DISCHARGE SUMMARY
Discharge Summary - oNe Cordova 46 y o  male MRN: [id number]    Unit/Bed#: [address] [place] [address] Encounter: [id number]    Admission Date: 1/18/2022   Discharge Date: 1/25/2022    Admitting Diagnosis:   Post-operative complication [Y89  9XXA]    Discharge Diagnoses: Principal Problem:    Sepsis ([facility] )  Active Problems:    Type 2 diabetes mellitus without complication, without long-term current use of insulin (HCC)    HIV exposure    Protein S deficiency ([facility] )    Current use of long term anticoagulation    Liver abscess    Intra-abdominal abscess post-procedure    Other constipation    Hyperlipidemia    Chest pain    Asthma      Consultations:  Infectious disease, hospitalists, cardiology, interventional radiology    Procedures Performed:  Percutaneous drain placement x2    HPI per admission H&P:  Noe Cordova is a 46 y o  male  DM, protein C&S deficiency, enlarged prostate, bipolar presenting with RLQ pain  Patient is s/p laparoscopic appendectomy 1/11  RLQ abdominal Pain started on Thursday 1/15  Pain described as a dull ache  Patient reports pain became progressively worse causing him to present to the emergency dept  Patient reports he last took his xeralto on Saturday 1/15  Patient reports passing gas  Denies having bowel movements  Denies any nausea or vomiting  Denies any chest pain or shortness of breath  Patient intially present at [facility] emergency dept and transferred to [facility]  In emergency dept patient found to have a leukocytosis of 14 5  Ct a/p showing: fluid collection in right paracolic gutter, rim-enhancing fluid collection in the posterior right lobe of liver, mild diverticulosis in left colon with no active diverticulitis  Hospital Course: Noe Cordova is a 46 y o  male admitted for sepsis secondary to intra-abdominal and intra-hepatic abscess one week after laparoscopic appendectomy   Patient was placed on Zosyn and Interventional Radiology was consulted to place a percutaneous drainage tube in each abscess  Patient experienced severe chest pain after IR procedure so cardiology was consulted  Troponins were negative, EKG showed no significant changes from previous, ECHO revealed 60% ejection fraction, and stress test was also negative  Infectious disease was consulted for aid in antibiotic coverage  Zosyn was switched to Unasyn  Patient's PREP was held while inpatient  Xarelto was restarted  Patient was evaluated by PT/OT and qualified for A home health  Once final susceptibilities were resulted for abscess fluid cultures patient's antibiotics were switched back to zosyn  Patient's leukocytosis resolved and his vitals remained stable  Chest pain attributed to intercostal placement of superior IR drain  Patient transitioned to oral augmentin upon discharge  Repeat CT scan revealed no residual or recurrent collections  Patient instructed to have a drain check with IR in 3 to 7 days and follow up with PCP, infectious disease, and [facility] clinic for syphilis treatment  Condition at Discharge: good     Discharge instructions/Information to patient and family:   See after visit summary for information provided to patient and family  Provisions for Follow-Up Care:  See after visit summary for information related to follow-up care and any pertinent home health orders  Disposition: Home with home health      Planned Readmission: No    Discharge Statement   I spent 25 minutes discharging the patient  This time was spent on the day of discharge  I had direct contact with the patient on the day of discharge  Additional documentation is required if more than 30 minutes were spent on discharge  Discharge Medications:  See after visit summary for reconciled discharge medications provided to patient and family

## 2022-01-25 NOTE — PLAN OF CARE
Problem: GASTROINTESTINAL - ADULT  Goal: Maintains or returns to baseline bowel function  Description: INTERVENTIONS:  - Assess bowel function  - Encourage oral fluids to ensure adequate hydration  - Administer IV fluids if ordered to ensure adequate hydration  - Administer ordered medications as needed  - Encourage mobilization and activity  - Consider nutritional services referral to assist patient with adequate nutrition and appropriate food choices  Outcome: Progressing     Problem: PAIN - ADULT  Goal: Verbalizes/displays adequate comfort level or baseline comfort level  Description: Interventions:  - Encourage patient to monitor pain and request assistance  - Assess pain using appropriate pain scale  - Administer analgesics based on type and severity of pain and evaluate response  - Implement non-pharmacological measures as appropriate and evaluate response  - Consider cultural and social influences on pain and pain management  - Notify physician/advanced practitioner if interventions unsuccessful or patient reports new pain  Outcome: Progressing     Problem: INFECTION - ADULT  Goal: Absence or prevention of progression during hospitalization  Description: INTERVENTIONS:  - Assess and monitor for signs and symptoms of infection  - Monitor lab/diagnostic results  - Monitor endotracheal if appropriate and nasal secretions for changes in amount and color  - Nunnelly appropriate cooling/warming therapies per order  - Administer medications as ordered  - Instruct and encourage patient and family to use good hand hygiene technique  - Identify and instruct in appropriate isolation precautions for identified infection/condition  Outcome: Progressing     Problem: MOBILITY - ADULT  Goal: Maintain or return to baseline ADL function  Description: INTERVENTIONS:  -  Assess patient's ability to carry out ADLs; assess patient's baseline for ADL function and identify physical deficits which impact ability to perform ADLs (bathing, care of mouth/teeth, toileting, grooming, dressing, etc )  - Assess/evaluate cause of self-care deficits   - Assess range of motion  - Assess patient's mobility; develop plan if impaired  - Assess patient's need for assistive devices and provide as appropriate  - Encourage maximum independence but intervene and supervise when necessary  - Involve family in performance of ADLs  - Assess for home care needs following discharge   - Consider OT consult to assist with ADL evaluation and planning for discharge  - Provide patient education as appropriate  Outcome: Progressing     Problem: RESPIRATORY - ADULT  Goal: Achieves optimal ventilation and oxygenation  Description: INTERVENTIONS:  - Assess for changes in respiratory status  - Assess for changes in mentation and behavior  - Position to facilitate oxygenation and minimize respiratory effort  - Oxygen administered by appropriate delivery if ordered  - Initiate smoking cessation education as indicated  - Encourage broncho-pulmonary hygiene including cough, deep breathe, Incentive Spirometry  - Assess the need for suctioning and aspirate as needed  - Assess and instruct to report SOB or any respiratory difficulty  - Respiratory Therapy support as indicated  Outcome: Progressing

## 2022-01-25 NOTE — PHYSICAL THERAPY NOTE
PT TREATMENT     01/25/22 1005   Note Type   Note Type Treatment   Pain Assessment   Pain Assessment Tool Noland-Baker FACES   Noland-Baker FACES Pain Rating 2   Pain Location/Orientation Location: Abdomen  (at rest)   Restrictions/Precautions   Other Precautions Pain  (BEL drains R abdomen)   General   Chart Reviewed Yes   Cognition   Overall Cognitive Status WFL   Subjective   Subjective "I can't take care of myself"   Bed Mobility   Supine to Sit 5  Supervision   Sit to Supine 5  Supervision   Transfers   Sit to Stand 5  Supervision   Stand to Sit 5  Supervision   Stand pivot 5  Supervision   Ambulation/Elevation   Gait pattern   (slow shabana, decreased step length)   Gait Assistance 5  Supervision   Assistive Device   (none)   Distance 3x50 feet with standing or sitting rest breaks   Stair Management Assistance 5  Supervision   Stair Management Technique Two rails   Number of Stairs 4   Balance   Static Sitting Good   Dynamic Sitting Fair +   Static Standing Fair +   Dynamic Standing Fair +   Ambulatory Fair   Activity Tolerance   Activity Tolerance Patient limited by pain; Patient limited by fatigue   Assessment   Prognosis Good   Problem List Decreased strength;Decreased endurance; Impaired balance;Decreased mobility   Assessment Pt is able to ambulate on indoor level surfaces x 50 feet before needing to rest due to c/o SOB and pain  Pt was only able to negotiate 4 steps up and down due to pain and fatigue  Pt requires excessive time to perform all mobility  Discussed with pt using his elevator in his building but pt reports the manager will not let him use it  Pt is capable of ambulating a short distance indoor level surface but will likely have difficulty navigating the 40 steps he describes that he has to climb to get to his rented room  Discussed this with      Also discussed with RN about medicating pt for pain that is non narcotic per pt request for better round the clock pain control which will likely result in improved activity tolerance  The patient's AM-PAC Basic Mobility Inpatient Short Form Raw Score is 23  A Raw score of 23 suggests the patient may benefit from discharge to home  Plan   Treatment/Interventions ADL retraining;Functional transfer training;LE strengthening/ROM; Elevations; Therapeutic exercise;Gait training;Bed mobility; Patient/family training   Progress Progressing toward goals   PT Frequency Other (Comment)  (5w)   Recommendation   PT Discharge Recommendation Home with home health rehabilitation;   Recommend assist for meal prep   AM-PAC Basic Mobility Inpatient   Turning in Bed Without Bedrails 4   Lying on Back to Sitting on Edge of Flat Bed 4   Moving Bed to Chair 4   Standing Up From Chair 4   Walk in Room 4   Climb 3-5 Stairs 3   Basic Mobility Inpatient Raw Score 23   Basic Mobility Standardized Score 50 88   Highest Level Of Mobility   JH-HLM Goal 7: Walk 25 feet or more   JH-HLM Highest Level of Mobility 7: Walk 25 feet or more   JH-HLM Goal Achieved Yes   Licensure   [place] License Number  Yuan Julian RIVERA [id number]

## 2022-01-25 NOTE — PROGRESS NOTES
Progress Note - General Surgery   Julianna Huber 46 y o  male MRN: [id number]  Unit/Bed#: [address] [place] [address] Encounter: [id number]    Assessment:  · Intra-abdominal and hepatic abscess in setting of recent laparoscopic appendectomy on 1/11/22 -- s/p IR percutaneous drainage x2 on 1/19/22  · IDDM -- HgbA1c: 7 1  · Protein S deficiency -- on xarelto  · Chest pain -- Patient with full cardiac work up  Negative stress test  EF 61% on ECHO, serial troponins negative  EKG no changes compared to last  Patient [facility] negative at [facility] 1/[facility], minimal at rest, worse with movement, likely from intercostal placement of superior drain  · Constipation -- resolved        Plan:  Transition to oral antibiotics (augmentin 875 q12), continue through 2/2/22   Repeat CT a/p from yesterday reviewed  Continue PREP upon discharge  Patient will need tx of benzathine PCN for recurrent syphilis upon discharge per [address] as outpatient  Tight glucose control  Discuss discharge planning with Case Management  Hopeful for discharge today        Subjective/Objective     Subjective:  Patient having some right-sided chest pain that is worse with movement and minimal at rest   Denies any severe abdominal pain, fevers, chills, nausea, vomiting  He states he wants to go to a short-term rehab facility upon discharge from the hospital       Objective:  Abscess fluid cultures growing E  Coli, Enterococcus Avium, Bacteroides fragilis and bacteroides ovatus  Blood cultures negative x4 days    Blood pressure 120/76, pulse 89, temperature 97 6 °F (36 4 °C), resp  rate 18, height 5' 10" (1 778 m), weight 81 2 kg (179 lb), SpO2 95 %  ,Body mass index is 25 68 kg/m²        Intake/Output Summary (Last 24 hours) at 1/25/2022 0921  Last data filed at 1/25/2022 7134  Gross per 24 hour   Intake --   Output 600 ml   Net -600 ml       Invasive Devices  Report    Peripheral Intravenous Line            Peripheral IV 01/21/22 Right Forearm 3 days Drain            Closed/Suction Drain Lateral RUQ Bulb 10 Fr  5 days    Closed/Suction Drain Right RLQ Bulb 10 Fr  5 days                Physical Exam: /76   Pulse 89   Temp 97 6 °F (36 4 °C)   Resp 18   Ht 5' 10" (1 778 m)   Wt 81 2 kg (179 lb)   SpO2 95%   BMI 25 68 kg/m²   General appearance: alert and oriented, in no acute distress  Head: Normocephalic, without obvious abnormality, atraumatic  Lungs: decreased breath sounds in right lower lobe, otherwise CTA  Heart: regular rate and rhythm, S1, S2 normal, no murmur, click, rub or gallop  Abdomen: soft,non-tender, BEL drain output is mostly purulent, possibly slightly feculent  Skin: Skin color, texture, turgor normal  No rashes or lesions    Lab, Imaging and other studies:  I have personally reviewed pertinent lab results    , CBC:   Lab Results   Component Value Date    WBC 11 76 (H) 01/25/2022    HGB 10 9 (L) 01/25/2022    HCT 33 0 (L) 01/25/2022    MCV 83 01/25/2022     (H) 01/25/2022    MCH 27 3 01/25/2022    MCHC 33 0 01/25/2022    RDW 13 8 01/25/2022    MPV 9 0 01/25/2022    NRBC 0 01/25/2022   , CMP:   Lab Results   Component Value Date    SODIUM 134 (L) 01/25/2022    K 4 2 01/25/2022    CL 97 (L) 01/25/2022    CO2 28 01/25/2022    BUN 10 01/25/2022    CREATININE 1 13 01/25/2022    CALCIUM 9 2 01/25/2022    EGFR 73 01/25/2022     VTE Pharmacologic Prophylaxis: Xarelto  VTE Mechanical Prophylaxis: sequential compression device

## 2022-01-25 NOTE — CASE MANAGEMENT
Case Management Discharge Planning Note    Patient name Arden Cortez  Location 2 [address] 212/2 [address] 1 MRN [id number]  : 1970 Date 2022       Current Admission Date: 2022  Current Admission Diagnosis:Sepsis [facility])   Patient Active Problem List    Diagnosis Date Noted    Asthma     Liver abscess 2022    Intra-abdominal abscess post-procedure 2022    Other constipation 2022    Sepsis ([facility] ) 2022    Hyperlipidemia 2022    Chest pain 2022    Acute perforated appendicitis 2022    Current use of long term anticoagulation 2022    Syphilis 2021    Gross hematuria 2021    High risk sexual behavior 10/14/2020    Positive QuantiFERON-TB Gold test 2020    Post-traumatic bulbous urethral stricture 2019    Spermatocele of epididymis 2019    Encounter for wellness examination 2019    Tobacco dependence 2019    Testicle lump 2019    Other male erectile dysfunction 2019    Near syncope 2018    Deep venous thrombosis ([facility] ) 2018    HIV exposure 2018    Mild intermittent asthma without complication 33/10/8232    Protein S deficiency ([facility] ) 2018    Positive RPR test 2016    Peptic ulcer disease 2016    Schizoaffective disorder, bipolar type ([facility] ) 2016    Generalized anxiety disorder 2016    Cannabis abuse, episodic 2016    Dyslexia, developmental 2016    Benign prostatic hyperplasia with urinary frequency 2016    Type 2 diabetes mellitus without complication, without long-term current use of insulin ([facility] ) 2016    Epilepsy ([facility] ) 2016      LOS (days): 7  Geometric Mean LOS (GMLOS) (days): 3 20  Days to GMLOS:-3 2     OBJECTIVE:  Risk of Unplanned Readmission Score: 19   Bundled Patient Payment: No Current Bundle     Current admission status: Inpatient   Preferred Pharmacy:   [address] [place] [address],[address], [place] - [address]  Phone: [phone] Fax: [phone]    [facility] [address] [facility], [address] [place] [address]  Phone: [phone] Fax: [phone]    [address], [address] [facility] [address]  Phone: [phone] Fax: [phone]    Primary Care Provider: No primary care provider on file  Primary Insurance: [facility] REP  Secondary Insurance: [facility] [address]    DISCHARGE DETAILS:     Requested [address]         Is the patient interested in [address] at discharge?: Yes  [address] requested[de-identified] [facility] Name[de-identified] [id number] Located [facility] [address] Follow-Up Provider[de-identified] Referring Provider  Home Health Services Needed[de-identified] Evaluate Functional Status and Safety,Gait/ADL Training,Post-Op Care and Assessment,Strengthening/Theraputic Exercises to Improve Function  Homebound Criteria Met[de-identified] Requires the Assistance of Another Person for Safe Ambulation or to Leave the Home  Supporting Clincal Findings[de-identified] Fatigues Easliy in Short Distances,Limited Endurance    DME Referral Provided  Referral made for DME?: No    Other Referral/Resources/Interventions Provided:  Interventions: HHC,Transportation  Referral Comments: Patient accepted by [facility]    Patient is requesting transportation home, SW will request LYFT or [address][de-identified]     Would you like to participate in our [address] service program?  : No - Declined    Discharge Destination Plan[de-identified] Home with [place] at Discharge : Griselda Flores

## 2022-01-25 NOTE — UTILIZATION REVIEW
Notification of Discharge   This is a Notification of Discharge from our facility [address]  Please be advised that this patient has been discharge from our facility  Below you will find the admission and discharge date and time including the patients disposition  UTILIZATION REVIEW CONTACT:  Alycia Cavazos  Utilization   Network Utilization Review Department  Phone: [phone] x carefully listen to the prompts  All voicemails are confidential   Email: [email or web address]     PHYSICIAN ADVISORY SERVICES:  FOR HYBY-QO-PHLS REVIEW - MEDICAL NECESSITY DENIAL  Phone: [phone]  Fax: [phone]  Email: [email or web address]     PRESENTATION DATE: 1/18/2022 11:40 PM  OBERVATION ADMISSION DATE:   INPATIENT ADMISSION DATE: 1/18/22 11:40 PM   DISCHARGE DATE: 1/25/2022  2:30 PM  DISPOSITION: Home with [place] with [address] [place] [address] Road INFORMATION:  Send all requests for admission clinical reviews, approved or denied determinations and any other requests to dedicated fax number below belonging to the campus where the patient is receiving treatment   List of dedicated fax numbers:  [address] DENIALS (Administrative/Medical Necessity) [phone]   [address] (Maternity/NICU/Pediatrics) [phone]   Gricelda Kida [phone]   [address] [facility] [phone]   [address] [place] [address] [phone]   [address] [phone]   [facility]  [phone]   [address] [place] [address], [facility]  [address] [facility] [address] [facility] [phone]

## 2022-01-25 NOTE — PROGRESS NOTES
[facility] Internal Medicine Progress Note  Patient: Kerri Prader 46 y o  male   MRN: [id number]  PCP: No primary care provider on file  Unit/Bed#: [address] [place] [address] Encounter: [id number]  Date Of Visit: 01/25/22    Problem List:    Principal Problem:    Sepsis ([facility] )  Active Problems:    Intra-abdominal abscess post-procedure    Liver abscess    Type 2 diabetes mellitus without complication, without long-term current use of insulin (HCC)    HIV exposure    Protein S deficiency ([facility] )    Hyperlipidemia    Asthma      Assessment & Plan:    * Sepsis ([facility] )  Assessment & Plan  As evidenced by tachycardia, leukocytosis  Secondary to liver abscess an abscess in the right paracolic gutter  Post drain placement by IR on January 19, 2022    · Patient was on IV Zosyn which was changed to IV Unasyn and then changed back to IV Zosyn  · Lactic acid level was normal  · Body fluid cultures growing E coli and Enterococcus  Anaerobic cultures growing Bacteroides  · Blood cultures negative  · White count peaked at 15,000 but is trending down      Results from last 7 days   Lab Units 01/25/22  0419 01/24/22  0521 01/22/22  2222 01/22/22  0523 01/21/22  0443 01/20/22  0616 01/19/22  1326   WBC Thousand/uL 11 76* 12 46* 15 12* 13 73* 13 95* 16 47* 12 10*         Intra-abdominal abscess post-procedure  Assessment & Plan  History of laparoscopic appendectomy on January 11, 2022  Post BEL drain  Patient was on IV Zosyn which was changed to IV Unasyn and then placed back on Zosyn    Per ID transition to Augmentin  Repeat CT scan shows no residual abscess or recurrent collection        Liver abscess  Assessment & Plan  On antibiotics as noted above  Repeat CT scan shows resolution  History of laparoscopic appendectomy on January 11, 2022    Asthma  Assessment & Plan  Patient complained of some chest tightness with walking with physical therapy  No wheezing on exam  Continue albuterol nebulizers p r n    Hyperlipidemia  Assessment & Plan  Continue Lipitor  Protein S deficiency [facility])  Assessment & Plan  Continue xarelto  HIV exposure  Assessment & Plan  On truvada prophylaxis per ID notes, recent HIV test negative  Type 2 diabetes mellitus without complication, without long-term current use of insulin [facility])  Assessment & Plan  Lab Results   Component Value Date    HGBA1C 7 1 (H) 11/16/2021       Recent Labs     01/24/22  1640 01/24/22  2125 01/25/22  0723 01/25/22  1129   POCGLU 145* 225* 188* 236*     Continue Humalog sliding scale with Accu-Cheks q a c  And HS while here and home medications on discharge    Chest pain-resolved as of 1/25/2022  Assessment & Plan  Patient reported some chest pain previously  EKG with new t wave inversions  Serial troponins were negative  2D echo was performed yesterday which showed EF of 60% without any regional wall motion abnormalities, grade 1 diastolic dysfunction  Nuclear stress test showed no evidence of ischemia  Patient started on metoprolol 25 milligram p o  B i d  Which was changed to Toprol-XL 25 milligram q h s  Patient complained again of right-sided pleuritic chest pain previously which has since resolved  Repeat troponin was negative and EKG showed no new changes  Likely related to liver abscess causing radiating pain  Doubtful patient has PE as patient is already on Xarelto  Other constipation-resolved as of 1/25/2022  Assessment & Plan  Received miralax  Patient started on Colace and senna and also received enema  Patient has been having regular bowel movements          VTE Pharmacologic Prophylaxis: VTE Score: 8 High Risk (Score >/= 5) - Pharmacological DVT Prophylaxis Ordered: rivaroxaban (Xarelto)  Sequential Compression Devices Ordered  Patient Centered Rounds: I performed bedside rounds with nursing staff today    Discussions with Specialists or Other Care Team Provider: yes - surgery    Education and Discussions with Family / Patient: yes - friend at bedside    Time Spent for Care: 30 minutes  More than 50% of total time spent on counseling and coordination of care as described above  Current Length of Stay: 7 day(s)  Current Patient Status: Inpatient   Discharge Plan: [facility] is following this patient on consult  They are medically stable for discharge when deemed appropriate by primary service  Code Status: Prior    Subjective:   Patient seen earlier today prior to discharge  reported significant improvement and stated that pain is 3/10 in intensity    Objective:     Vitals:   Temp (24hrs), Av 3 °F (36 8 °C), Min:97 6 °F (36 4 °C), Max:99 4 °F (37 4 °C)    Temp:  [97 6 °F (36 4 °C)-99 4 °F (37 4 °C)] 97 6 °F (36 4 °C)  HR:  [89] 89  Resp:  [18] 18  BP: (119-127)/(76-82) 120/76  SpO2:  [95 %-96 %] 95 %  Body mass index is 25 68 kg/m²  Input and Output Summary (last 24 hours): Intake/Output Summary (Last 24 hours) at 2022 1832  Last data filed at 2022 5308  Gross per 24 hour   Intake --   Output 600 ml   Net -600 ml       Physical Exam:   Physical Exam  Constitutional:       General: He is not in acute distress  Appearance: He is not diaphoretic  HENT:      Head: Normocephalic and atraumatic  Eyes:      General:         Right eye: No discharge  Left eye: No discharge  Cardiovascular:      Rate and Rhythm: Normal rate and regular rhythm  Pulmonary:      Effort: Pulmonary effort is normal  No respiratory distress  Breath sounds: Normal breath sounds  No wheezing or rales  Abdominal:      General: Bowel sounds are normal  There is no distension  Palpations: Abdomen is soft  Tenderness: There is no abdominal tenderness  Comments: BEL drains in place   Neurological:      Mental Status: He is alert and oriented to person, place, and time           Additional Data:     Labs:  Results from last 7 days   Lab Units 22  0419   WBC Thousand/uL 11 76*   HEMOGLOBIN g/dL 10 9*   HEMATOCRIT % 33 0*   PLATELETS Thousands/uL 627* NEUTROS PCT % 67   LYMPHS PCT % 20   MONOS PCT % 9   EOS PCT % 2     Results from last 7 days   Lab Units 01/25/22  0419 01/24/22  0521 01/24/22  0521   SODIUM mmol/L 134*   < > 135*   POTASSIUM mmol/L 4 2   < > 4 3   CHLORIDE mmol/L 97*   < > 99*   CO2 mmol/L 28   < > 27   BUN mg/dL 10   < > 11   CREATININE mg/dL 1 13   < > 1 02   ANION GAP mmol/L 9   < > 9   CALCIUM mg/dL 9 2   < > 8 9   ALBUMIN g/dL  --   --  2 2*   TOTAL BILIRUBIN mg/dL  --   --  0 36   ALK PHOS U/L  --   --  99   ALT U/L  --   --  46   AST U/L  --   --  30   GLUCOSE RANDOM mg/dL 172*   < > 231*    < > = values in this interval not displayed  Results from last 7 days   Lab Units 01/25/22  1129 01/25/22  0723 01/24/22  2125 01/24/22  1640 01/24/22  1113 01/24/22  0802 01/23/22  2111 01/23/22  1142 01/23/22  0706 01/22/22  2047 01/22/22  1546 01/22/22  1151   POC GLUCOSE mg/dl 236* 188* 225* 145* 202* 222* 244* 207* 247* 216* 160* 179*               Lines/Drains:  Invasive Devices  Report    Peripheral Intravenous Line            Peripheral IV 01/21/22 Right Forearm 3 days          Drain            Closed/Suction Drain Lateral RUQ Bulb 10 Fr  6 days    Closed/Suction Drain Right RLQ Bulb 10 Fr  6 days                      Imaging: Reviewed radiology reports from this admission including: abdominal/pelvic CT    Recent Cultures (last 7 days):   Results from last 7 days   Lab Units 01/20/22  1646 01/20/22  1500 01/19/22  1158   BLOOD CULTURE  No Growth After 4 Days  No Growth After 4 Days    --    GRAM STAIN RESULT   --   --  2+ Gram positive cocci in pairs and chains*  2+ Gram negative rods*  2+ Gram positive rods*  3+ Polys*  3+ Gram negative rods*  3+ Gram positive rods*  2+ Gram positive cocci in pairs and chains*  No polys seen*   BODY FLUID CULTURE, STERILE   --   --  1+ Growth of - Strain #1 Escherichia coli*  Few Colonies of - Strain #2 Escherichia coli*  1+ Growth of Enterococcus avium*  4+ Growth of Escherichia coli*  4+ Growth of Enterococcus avium*       Last 24 Hours Medication List:        Today, Patient Was Seen By: Mara Fulton DO    ** Please Note: "This note has been constructed using a voice recognition system  Therefore there may be syntax, spelling, and/or grammatical errors   Please call if you have any questions  "**

## 2022-01-25 NOTE — DISCHARGE INSTRUCTIONS
TUBE CARE INSTRUCTIONS    Care after your procedure:      1  The properly functioning catheter should be forward flushed once (1x) daily with 10ml of normal saline using clean technique  You will be given a prescription for flushes  To flush the tube, clean both connections with alcohol swab  Twist off the drainage bag/ bulb  tubing and twist the saline syringe into the drainage tube and flush  Remove the syringe and twist the drainage bag / bulb tubing tubing back on     2  The drainage bag/bulb may be emptied as necessary  Keep a record of the amount of fluid you drain from your tube  This should be done with clean technique as well  3  A fresh dressing should be applied daily over the tube insertion site  4  As the tube is secured to the skin with only a suture,try not to pull on your tube  Tub baths are not permitted  Showers are permitted if the patient's skin entry site is prevented from getting wet  Similarly, washcloth "baths" are acceptable  Contact Interventional Radiology at [phone] for:    1  Leakage or large amounts of liquid around the catheter  2  Fever of 101 degrees lasting several hours without other obvious cause (such as sore throat, flu, etc)  3  Persistent nausea or vomiting  4  Diminished drainage, which may be associated with pressure or pain  Or when the drainage from your tube is less than 10mls for 48 hours  5  Catheter pulled back or falls out  The following pharmacies carry the flush syringes         Levine, Susan. \[facility].\"" HOSPITAL AND [facility]                     [facility]  [address] [facility]                         [address] [place] [address]  Phone [phone]            Phone [phone] [facility] [address]                                [phone]  [address] [facility] Zachary MONDRAGON                      [address] [facility]  Phone [phone]            Phone [phone]                      Marlon Corea                                                                                                          [phone]  [facility]  [facility]    [address]  Phone [phone]

## 2022-01-25 NOTE — DISCHARGE INSTR - AVS FIRST PAGE
Please follow up with the [address] [place] [address] and your primary care provider to start on benzathine penicillin for syphilis treatment  Please call interventional radiology to schedule a follow up appointment for drain check within 3 to 7 days

## 2022-01-25 NOTE — PLAN OF CARE
Problem: GASTROINTESTINAL - ADULT  Goal: Maintains or returns to baseline bowel function  Description: INTERVENTIONS:  - Assess bowel function  - Encourage oral fluids to ensure adequate hydration  - Administer IV fluids if ordered to ensure adequate hydration  - Administer ordered medications as needed  - Encourage mobilization and activity  - Consider nutritional services referral to assist patient with adequate nutrition and appropriate food choices  Outcome: Progressing     Problem: PAIN - ADULT  Goal: Verbalizes/displays adequate comfort level or baseline comfort level  Description: Interventions:  - Encourage patient to monitor pain and request assistance  - Assess pain using appropriate pain scale  - Administer analgesics based on type and severity of pain and evaluate response  - Implement non-pharmacological measures as appropriate and evaluate response  - Consider cultural and social influences on pain and pain management  - Notify physician/advanced practitioner if interventions unsuccessful or patient reports new pain  Outcome: Progressing     Problem: Potential for Falls  Goal: Patient will remain free of falls  Description: INTERVENTIONS:  - Educate patient/family on patient safety including physical limitations  - Instruct patient to call for assistance with activity   - Consult OT/PT to assist with strengthening/mobility   - Keep Call bell within reach  - Keep bed low and locked with side rails adjusted as appropriate  - Keep care items and personal belongings within reach  - Initiate and maintain comfort rounds  - Make Fall Risk Sign visible to staff  - Offer Toileting every 2 Hours, in advance of need  - Initiate/Maintain bed/chair alarm  - Obtain necessary fall risk management equipment: fall risk sign  - Apply yellow socks and bracelet for high fall risk patients  - Consider moving patient to room near nurses station  Outcome: Progressing

## 2022-01-26 NOTE — ASSESSMENT & PLAN NOTE
History of laparoscopic appendectomy on January 11, 2022  Post BEL drain  Patient was on IV Zosyn which was changed to IV Unasyn and then placed back on Zosyn    Per ID transition to Augmentin  Repeat CT scan shows no residual abscess or recurrent collection

## 2022-01-26 NOTE — ASSESSMENT & PLAN NOTE
Lab Results   Component Value Date    HGBA1C 7 1 (H) 11/16/2021       Recent Labs     01/24/22  1640 01/24/22  2125 01/25/22  0723 01/25/22  1129   POCGLU 145* 225* 188* 236*     Continue Humalog sliding scale with Accu-Cheks q a c   And HS while here and home medications on discharge

## 2022-01-26 NOTE — ASSESSMENT & PLAN NOTE
Patient reported some chest pain previously  EKG with new t wave inversions  Serial troponins were negative  2D echo was performed yesterday which showed EF of 60% without any regional wall motion abnormalities, grade 1 diastolic dysfunction  Nuclear stress test showed no evidence of ischemia  Patient started on metoprolol 25 milligram p o  B i d  Which was changed to Toprol-XL 25 milligram q h s  Patient complained again of right-sided pleuritic chest pain previously which has since resolved  Repeat troponin was negative and EKG showed no new changes  Likely related to liver abscess causing radiating pain  Doubtful patient has PE as patient is already on Xarelto

## 2022-01-26 NOTE — ASSESSMENT & PLAN NOTE
On antibiotics as noted above  Repeat CT scan shows resolution  History of laparoscopic appendectomy on January 11, 2022

## 2022-01-26 NOTE — NURSING NOTE
Pt's IV removed  Pt left with all belongings  Pt educated with AVS  Pt verbalized understanding of AVS  Pt shown how to flush tubes  Pt verbalized and demonstrated understanding

## 2022-01-26 NOTE — ASSESSMENT & PLAN NOTE
As evidenced by tachycardia, leukocytosis  Secondary to liver abscess an abscess in the right paracolic gutter  Post drain placement by IR on January 19, 2022    · Patient was on IV Zosyn which was changed to IV Unasyn and then changed back to IV Zosyn  · Lactic acid level was normal  · Body fluid cultures growing E coli and Enterococcus    Anaerobic cultures growing Bacteroides  · Blood cultures negative  · White count peaked at 15,000 but is trending down      Results from last 7 days   Lab Units 01/25/22  0419 01/24/22  0521 01/22/22  2222 01/22/22  0523 01/21/22  0443 01/20/22  0616 01/19/22  1326   WBC Thousand/uL 11 76* 12 46* 15 12* 13 73* 13 95* 16 47* 12 10*

## 2022-01-27 ENCOUNTER — TELEPHONE (OUTPATIENT)
Dept: OTHER | Facility: OTHER | Age: 52
End: 2022-01-27

## 2022-01-27 NOTE — TELEPHONE ENCOUNTER
Lila PT with [facility] calling to advise Dr Jason Akins that the patient started home PT as of 01/27/22 and will continue 2x a week for the next 2-3 weeks

## 2022-01-30 ENCOUNTER — APPOINTMENT (EMERGENCY)
Dept: RADIOLOGY | Facility: HOSPITAL | Age: 52
End: 2022-01-30
Payer: COMMERCIAL

## 2022-01-30 ENCOUNTER — HOSPITAL ENCOUNTER (EMERGENCY)
Facility: HOSPITAL | Age: 52
Discharge: HOME | End: 2022-01-30
Attending: EMERGENCY MEDICINE
Payer: COMMERCIAL

## 2022-01-30 VITALS
HEART RATE: 94 BPM | BODY MASS INDEX: 25.62 KG/M2 | TEMPERATURE: 97.2 F | RESPIRATION RATE: 18 BRPM | DIASTOLIC BLOOD PRESSURE: 62 MMHG | OXYGEN SATURATION: 98 % | SYSTOLIC BLOOD PRESSURE: 101 MMHG | WEIGHT: 179 LBS | HEIGHT: 70 IN

## 2022-01-30 DIAGNOSIS — Z48.89 ENCOUNTER FOR POST SURGICAL WOUND CHECK: Primary | ICD-10-CM

## 2022-01-30 LAB
ALBUMIN SERPL BCP-MCNC: 3 G/DL (ref 3.5–5)
ALP SERPL-CCNC: 121 U/L (ref 46–116)
ALT SERPL W P-5'-P-CCNC: 51 U/L (ref 12–78)
ANION GAP SERPL CALCULATED.3IONS-SCNC: 10 MMOL/L (ref 4–13)
AST SERPL W P-5'-P-CCNC: 21 U/L (ref 5–45)
BACTERIA UR QL AUTO: NORMAL /HPF
BASOPHILS # BLD AUTO: 0.08 THOUSANDS/ΜL (ref 0–0.1)
BASOPHILS NFR BLD AUTO: 1 % (ref 0–1)
BILIRUB SERPL-MCNC: 0.4 MG/DL (ref 0.2–1)
BILIRUB UR QL STRIP: NEGATIVE
BUN SERPL-MCNC: 15 MG/DL (ref 5–25)
CALCIUM ALBUM COR SERPL-MCNC: 10.4 MG/DL (ref 8.3–10.1)
CALCIUM SERPL-MCNC: 9.6 MG/DL (ref 8.3–10.1)
CHLORIDE SERPL-SCNC: 96 MMOL/L (ref 100–108)
CLARITY UR: ABNORMAL
CO2 SERPL-SCNC: 29 MMOL/L (ref 21–32)
COLOR UR: ABNORMAL
CREAT SERPL-MCNC: 1.16 MG/DL (ref 0.6–1.3)
EOSINOPHIL # BLD AUTO: 0.14 THOUSAND/ΜL (ref 0–0.61)
EOSINOPHIL NFR BLD AUTO: 2 % (ref 0–6)
ERYTHROCYTE [DISTWIDTH] IN BLOOD BY AUTOMATED COUNT: 13.6 % (ref 11.6–15.1)
GFR SERPL CREATININE-BSD FRML MDRD: 71 ML/MIN/1.73SQ M
GLUCOSE SERPL-MCNC: 127 MG/DL (ref 65–140)
GLUCOSE UR STRIP-MCNC: NEGATIVE MG/DL
HCT VFR BLD AUTO: 39.8 % (ref 36.5–49.3)
HGB BLD-MCNC: 12.4 G/DL (ref 12–17)
HGB UR QL STRIP.AUTO: ABNORMAL
IMM GRANULOCYTES # BLD AUTO: 0.03 THOUSAND/UL (ref 0–0.2)
IMM GRANULOCYTES NFR BLD AUTO: 0 % (ref 0–2)
KETONES UR STRIP-MCNC: NEGATIVE MG/DL
LEUKOCYTE ESTERASE UR QL STRIP: NEGATIVE
LIPASE SERPL-CCNC: 95 U/L (ref 73–393)
LYMPHOCYTES # BLD AUTO: 2.13 THOUSANDS/ΜL (ref 0.6–4.47)
LYMPHOCYTES NFR BLD AUTO: 27 % (ref 14–44)
MCH RBC QN AUTO: 26.6 PG (ref 26.8–34.3)
MCHC RBC AUTO-ENTMCNC: 31.2 G/DL (ref 31.4–37.4)
MCV RBC AUTO: 85 FL (ref 82–98)
MONOCYTES # BLD AUTO: 0.61 THOUSAND/ΜL (ref 0.17–1.22)
MONOCYTES NFR BLD AUTO: 8 % (ref 4–12)
NEUTROPHILS # BLD AUTO: 4.79 THOUSANDS/ΜL (ref 1.85–7.62)
NEUTS SEG NFR BLD AUTO: 62 % (ref 43–75)
NITRITE UR QL STRIP: NEGATIVE
NON-SQ EPI CELLS URNS QL MICRO: NORMAL /HPF
NRBC BLD AUTO-RTO: 0 /100 WBCS
PH UR STRIP.AUTO: 6 [PH]
PLATELET # BLD AUTO: 722 THOUSANDS/UL (ref 149–390)
PMV BLD AUTO: 8.9 FL (ref 8.9–12.7)
POTASSIUM SERPL-SCNC: 4.3 MMOL/L (ref 3.5–5.3)
PROT SERPL-MCNC: 9.9 G/DL (ref 6.4–8.2)
PROT UR STRIP-MCNC: ABNORMAL MG/DL
RBC # BLD AUTO: 4.67 MILLION/UL (ref 3.88–5.62)
RBC #/AREA URNS AUTO: NORMAL /HPF
SODIUM SERPL-SCNC: 135 MMOL/L (ref 136–145)
SP GR UR STRIP.AUTO: >=1.03 (ref 1–1.03)
UROBILINOGEN UR QL STRIP.AUTO: 0.2 E.U./DL
WBC # BLD AUTO: 7.78 THOUSAND/UL (ref 4.31–10.16)
WBC #/AREA URNS AUTO: NORMAL /HPF

## 2022-01-30 PROCEDURE — 85025 COMPLETE CBC W/AUTO DIFF WBC: CPT | Performed by: EMERGENCY MEDICINE

## 2022-01-30 PROCEDURE — 99284 EMERGENCY DEPT VISIT MOD MDM: CPT

## 2022-01-30 PROCEDURE — 99284 EMERGENCY DEPT VISIT MOD MDM: CPT | Performed by: EMERGENCY MEDICINE

## 2022-01-30 PROCEDURE — 74177 CT ABD & PELVIS W/CONTRAST: CPT

## 2022-01-30 PROCEDURE — 80053 COMPREHEN METABOLIC PANEL: CPT | Performed by: EMERGENCY MEDICINE

## 2022-01-30 PROCEDURE — G1004 CDSM NDSC: HCPCS

## 2022-01-30 PROCEDURE — 36415 COLL VENOUS BLD VENIPUNCTURE: CPT | Performed by: EMERGENCY MEDICINE

## 2022-01-30 PROCEDURE — 83690 ASSAY OF LIPASE: CPT | Performed by: EMERGENCY MEDICINE

## 2022-01-30 PROCEDURE — 81001 URINALYSIS AUTO W/SCOPE: CPT | Performed by: EMERGENCY MEDICINE

## 2022-01-30 RX ADMIN — IOHEXOL 100 ML: 350 INJECTION, SOLUTION INTRAVENOUS at 17:58

## 2022-01-30 NOTE — ED PROVIDER NOTES
History  Chief Complaint   Patient presents with    Drainage from Incision     c/o leaking BEL drain at RUQ   Pt S/p laparoscopic appendectomy 1/11/22   Pt had intra-abdominal and hapatic abcess s/p guided percutaneous drainage 1/19/22  Patient had a perforated appendicitis with a complicated postoperative course  Patient underwent laparoscopic cholecystectomy with subsequent formation and percutaneous drainage of an intra-abdominal abscess by IR  Patient has 2 drains which a VNA has been caring for  Today the nurse had attempted to irrigate the top drain, and noted fluid return from the incision around the tube, associated with abdominal pain  Patient has denied fever chills  He is eating and drinking well, having normal bowel function return  Of note, nurse told him was not safe to be at his present establishment, which has no heat or electricity  Prior to Admission Medications   Prescriptions Last Dose Informant Patient Reported? Taking?    ACCU-CHEK FASTCLIX LANCETS MISC   No No   Sig: by Does not apply route daily   ACCU-CHEK GUIDE test strip   Yes No   Blood Glucose Monitoring Suppl (ACCU-CHEK GUIDE) w/Device KIT   No No   Sig: by Does not apply route daily   albuterol (ProAir HFA) 90 mcg/act inhaler   No No   Sig: Inhale 2 puffs every 4 (four) hours as needed for wheezing or shortness of breath   amoxicillin-clavulanate (AUGMENTIN) 875-125 mg per tablet   No No   Sig: Take 1 tablet by mouth every 12 (twelve) hours for 14 days   atorvastatin (LIPITOR) 10 mg tablet   No No   Sig: Take 1 tablet (10 mg total) by mouth daily   emtricitabine-tenofovir (Truvada) 200-300 mg per tablet   No No   Sig: Take 1 tablet by mouth every 24 hours   isoniazid (NYDRAZID) 100 mg tablet  Self Yes No   Sig: Take 100 mg by mouth daily   Patient not taking: Reported on 1/18/2022    metFORMIN (GLUCOPHAGE) 1000 MG tablet   No No   Sig: Take 1 tablet (1,000 mg total) by mouth 2 (two) times a day with meals   oxyCODONE (ROXICODONE) 5 immediate release tablet   No No   Sig: Take 1 tablet (5 mg total) by mouth every 4 (four) hours as needed for severe pain for up to 5 days Max Daily Amount: 30 mg   pyridoxine (VITAMIN B6) 100 mg tablet  Self Yes No   Sig: Take 100 mg by mouth daily     rivaroxaban (Xarelto) 20 mg tablet   No No   Sig: Take 1 tablet (20 mg total) by mouth every 24 hours   Patient not taking: Reported on 1/18/2022       Facility-Administered Medications: None       Past Medical History:   Diagnosis Date    Anemia     Anxiety     Arthritis     Asthma     Blood clot in vein 2017    right leg    Blood disorder     Chronic pain disorder     good control    Depression     Diabetes mellitus ([facility] [address] )     type 2    Enlarged prostate     [facility]     past, last was 1 year ago    Hypertension     Protein S deficiency ([facility]     on xarelto    Psychiatric disorder     bipolar    Psychiatric illness     Psychosis [facility]     Sleep apnea     snores but hasn't been tested    Vertigo        Past Surgical History:   Procedure Laterality Date    APPENDECTOMY LAPAROSCOPIC N/A 1/11/2022    Procedure: APPENDECTOMY LAPAROSCOPIC;  Surgeon: Kerrie Padilla MD;  Location: [facility];  Service: General    CYST REMOVAL      ESOPHAGOGASTRODUODENOSCOPY N/A 11/9/2016    Procedure: ESOPHAGOGASTRODUODENOSCOPY (EGD); Surgeon: Arlin Deleon MD;  Location:  GI LAB; Service:     ESOPHAGOGASTRODUODENOSCOPY N/A 9/2/2016    Procedure: ESOPHAGOGASTRODUODENOSCOPY (EGD); Surgeon: Arlin Deleon MD;  Location:  GI LAB;   Service:    [address]      IR DRAINAGE TUBE PLACEMENT  1/19/2022       Family History   Problem Relation Age of Onset    No Known Problems Mother     Colon cancer Father 61    No Known Problems Sister     No Known Problems Brother     No Known Problems Maternal Aunt     No Known Problems Paternal Aunt     No Known Problems Maternal Uncle     No Known Problems Paternal Uncle     No Known Problems Maternal Grandfather     No Known Problems Maternal Grandmother     No Known Problems Paternal Grandfather     Hypertension Paternal Grandmother     No Known Problems Cousin     Prostate cancer Family     Stroke Family     Diabetes Family         MELLITUS    Coronary artery disease Family      I have reviewed and agree with the history as documented  E-Cigarette/Vaping    E-Cigarette Use Never User      E-Cigarette/Vaping Substances    Nicotine No     THC No     CBD No     Flavoring No     Other No     Unknown No      Social History     Tobacco Use    Smoking status: Current Some Day Smoker     Packs/day: 0 25     Years: 15 00     Pack years: 3 75     Types: Cigarettes    Smokeless tobacco: Current User    Tobacco comment: pack a month   Vaping Use    Vaping Use: Never used   Substance Use Topics    Alcohol use: Yes     Comment: occassional    Drug use: Yes     Types: Marijuana       Review of Systems   Constitutional: Negative for chills and fever  HENT: Negative for congestion and sore throat  Eyes: Negative for visual disturbance  Respiratory: Negative for cough and shortness of breath  Cardiovascular: Negative for chest pain  Gastrointestinal: Positive for abdominal pain  Negative for blood in stool, diarrhea, nausea and vomiting  Genitourinary: Negative for dysuria  Musculoskeletal: Negative for back pain  Skin: Positive for wound  Neurological: Negative for weakness and headaches  Hematological: Does not bruise/bleed easily  Psychiatric/Behavioral: Negative for confusion  All other systems reviewed and are negative  Physical Exam  Physical Exam  Vitals and nursing note reviewed  Constitutional:       Appearance: Normal appearance  HENT:      Head: Normocephalic  Right Ear: External ear normal       Left Ear: External ear normal       Nose: Nose normal       Mouth/Throat:      Pharynx: Oropharynx is clear     Eyes:      Conjunctiva/sclera: Conjunctivae normal    Cardiovascular:      Rate and Rhythm: Normal rate and regular rhythm  Pulses: Normal pulses  Pulmonary:      Effort: Pulmonary effort is normal       Breath sounds: Normal breath sounds  Abdominal:      General: Abdomen is flat  Bowel sounds are normal       Comments: Mild incisional tenderness   Musculoskeletal:         General: Normal range of motion  Cervical back: Normal range of motion  Skin:     General: Skin is warm and dry  Capillary Refill: Capillary refill takes less than 2 seconds  Neurological:      General: No focal deficit present  Mental Status: He is alert and oriented to person, place, and time     Psychiatric:         Mood and Affect: Mood normal          Behavior: Behavior normal          Vital Signs  ED Triage Vitals [01/30/22 1521]   Temperature Pulse Respirations Blood Pressure SpO2   (!) 97 2 °F (36 2 °C) 94 18 101/62 98 %      Temp src Heart Rate Source Patient Position - Orthostatic VS BP Location FiO2 (%)   -- Monitor Lying Left arm --      Pain Score       4           Vitals:    01/30/22 1521   BP: 101/62   Pulse: 94   Patient Position - Orthostatic VS: Lying         Visual Acuity      ED Medications  Medications - No data to display    Diagnostic Studies  Results Reviewed     Procedure Component Value Units Date/Time    Lipase [[id number]]  (Normal) Collected: 01/30/22 1623    Lab Status: Final result Specimen: Blood from Arm, Left Updated: 01/30/22 1647     Lipase 95 u/L     Comprehensive metabolic panel [[id number]]  (Abnormal) Collected: 01/30/22 1623    Lab Status: Final result Specimen: Blood from Arm, Left Updated: 01/30/22 1647     Sodium 135 mmol/L      Potassium 4 3 mmol/L      Chloride 96 mmol/L      CO2 29 mmol/L      ANION GAP 10 mmol/L      BUN 15 mg/dL      Creatinine 1 16 mg/dL      Glucose 127 mg/dL      Calcium 9 6 mg/dL      Corrected Calcium 10 4 mg/dL      AST 21 U/L      ALT 51 U/L      Alkaline Phosphatase 121 U/L Total Protein 9 9 g/dL      Albumin 3 0 g/dL      Total Bilirubin 0 40 mg/dL      eGFR 71 ml/min/1 73sq m     Narrative:      National Kidney Disease Foundation guidelines for Chronic Kidney Disease (CKD):     Stage 1 with normal or high GFR (GFR > 90 mL/min/1 73 square meters)    Stage 2 Mild CKD (GFR = 60-89 mL/min/1 73 square meters)    Stage 3A Moderate CKD (GFR = 45-59 mL/min/1 73 square meters)    Stage 3B Moderate CKD (GFR = 30-44 mL/min/1 73 square meters)    Stage 4 Severe CKD (GFR = 15-29 mL/min/1 73 square meters)    Stage 5 End Stage CKD (GFR <15 mL/min/1 73 square meters)  Note: GFR calculation is accurate only with a steady state creatinine    CBC and differential [[id number]]  (Abnormal) Collected: 01/30/22 1623    Lab Status: Final result Specimen: Blood from Arm, Left Updated: 01/30/22 1629     WBC 7 78 Thousand/uL      RBC 4 67 Million/uL      Hemoglobin 12 4 g/dL      Hematocrit 39 8 %      MCV 85 fL      MCH 26 6 pg      MCHC 31 2 g/dL      RDW 13 6 %      MPV 8 9 fL      Platelets 982 Thousands/uL      nRBC 0 /100 WBCs      Neutrophils Relative 62 %      Immat GRANS % 0 %      Lymphocytes Relative 27 %      Monocytes Relative 8 %      Eosinophils Relative 2 %      Basophils Relative 1 %      Neutrophils Absolute 4 79 Thousands/µL      Immature Grans Absolute 0 03 Thousand/uL      Lymphocytes Absolute 2 13 Thousands/µL      Monocytes Absolute 0 61 Thousand/µL      Eosinophils Absolute 0 14 Thousand/µL      Basophils Absolute 0 08 Thousands/µL     UA (URINE) with reflex to Scope [[id number]]     Lab Status: No result Specimen: Urine                  CT abdomen pelvis with contrast    (Results Pending)              Procedures  Procedures         ED Course                                             MDM  Number of Diagnoses or Management Options  Diagnosis management comments: Patient's recent abdominal abscess with tube dysfunction      Disposition  Final diagnoses:   None     ED Disposition None      Follow-up Information    None         Patient's Medications   Discharge Prescriptions    No medications on file       No discharge procedures on file      PDMP Review     None          ED Provider  Electronically Signed by           Emely Arreguin MD  01/30/22 0700

## 2022-01-30 NOTE — ED CARE HANDOFF
Emergency Department Sign Out Note        Sign out and transfer of care from *Dr Marlee Alves**  See Separate Emergency Department note  The patient, Lillie Gee, was evaluated by the previous provider for **drain problem*  Workup Completed:  *labs**    ED Course / Workup Pending (followup):  *CT**                                     Procedures  MDM  Number of Diagnoses or Management Options  Encounter for post surgical wound check  Diagnosis management comments: Dr Marlee Alves ordered CT scan to r/o collection or re-acculmulation of fluid/abscess  Labs and CT scan are normal   Pt  Is scheduled to have drain removed on Wednesday  Pt  Is staying at [facility] and says he can go back there  Advised to make sure he goes to appointment on Wednesday but to return to ER if fever, pain, or other urgent concerns  Disposition  Final diagnoses:   Encounter for post surgical wound check     Time reflects when diagnosis was documented in both MDM as applicable and the Disposition within this note     Time User Action Codes Description Comment    9/92/5004  8:72 PM Rozena Jumper Add [K40 14] Leaking of conjunctival drainage bleb     7/26/6065  6:74 PM Rozena Jumper Remove [H59 89] Leaking of conjunctival drainage bleb     1/61/5540  2:38 PM Rozena Jumper Add [Y21 89] Encounter for post surgical wound check       ED Disposition     ED Disposition Condition Date/Time Comment    Discharge Stable Sun Jan 30, 2022  6:46 PM Lillie Gee discharge to home/self care  Follow-up Information    None       Patient's Medications   Discharge Prescriptions    No medications on file     No discharge procedures on file         ED Provider  Electronically Signed by     Raul John MD  46/58/12 9314

## 2022-01-30 NOTE — DISCHARGE INSTRUCTIONS
Your CT scan and blood work are ok  There is nothing left to drain  The drain should just be removed as scheduled on Wednesday  Just keep the area covered and protected  Return to ER if fever or pain or other urgent concerns

## 2022-02-02 ENCOUNTER — HOSPITAL ENCOUNTER (OUTPATIENT)
Dept: NON INVASIVE DIAGNOSTICS | Facility: HOSPITAL | Age: 52
Discharge: HOME | End: 2022-02-02
Attending: RADIOLOGY | Admitting: RADIOLOGY
Payer: COMMERCIAL

## 2022-02-02 DIAGNOSIS — T81.43XA INTRA-ABDOMINAL ABSCESS POST-PROCEDURE: ICD-10-CM

## 2022-02-02 DIAGNOSIS — K75.0 LIVER ABSCESS: ICD-10-CM

## 2022-02-02 PROCEDURE — 49424 ASSESS CYST CONTRAST INJECT: CPT

## 2022-02-02 PROCEDURE — 49424 ASSESS CYST CONTRAST INJECT: CPT | Performed by: RADIOLOGY

## 2022-02-02 PROCEDURE — 76080 X-RAY EXAM OF FISTULA: CPT | Performed by: RADIOLOGY

## 2022-02-02 PROCEDURE — 76080 X-RAY EXAM OF FISTULA: CPT

## 2022-02-02 RX ADMIN — IOHEXOL 6 ML: 350 INJECTION, SOLUTION INTRAVENOUS at 12:56

## 2022-02-02 NOTE — DISCHARGE INSTRUCTIONS
Drainage Tube Removal    Your drainage tube was removed today  What you need know at home:   Keep a clean dry dressing at the tube site until the small opening closes  It will take twenty four to forty eight hours  Keep the site dry until it heals  A small amount of drainage on your dressing is normal  Resume your normal diet  Small sips of flat soda will help with any nausea  Contact Interventional Radiology for any of the following: You have pain, fever greater than 101, shaking chills  If you have increased redness or swelling at the site  I the drainage from your site does not stop  If the site drains pus or has a bad odor       Contact Interventional Radiology at [phone]   Omid PATIENTS: Contact Interventional Radiology at [phone]) Sy Duvall PATIENTS: Contact Interventional Radiology at [phone]) if:

## 2022-02-02 NOTE — SEDATION DOCUMENTATION
Tube check completed  Both RLQ and RUQ tubes removed per Dr Sol Prabhakar  Dressing applied   Discharge instructions given to patient

## 2022-02-03 ENCOUNTER — OFFICE VISIT (OUTPATIENT)
Dept: INFECTIOUS DISEASES | Facility: CLINIC | Age: 52
End: 2022-02-03
Payer: COMMERCIAL

## 2022-02-03 VITALS
SYSTOLIC BLOOD PRESSURE: 125 MMHG | OXYGEN SATURATION: 98 % | TEMPERATURE: 97.2 F | RESPIRATION RATE: 17 BRPM | BODY MASS INDEX: 25.62 KG/M2 | DIASTOLIC BLOOD PRESSURE: 75 MMHG | WEIGHT: 179 LBS | HEIGHT: 70 IN | HEART RATE: 81 BPM

## 2022-02-03 DIAGNOSIS — K75.0 HEPATIC ABSCESS: Primary | ICD-10-CM

## 2022-02-03 DIAGNOSIS — A52.8 SYPHILIS, LATE LATENT: ICD-10-CM

## 2022-02-03 DIAGNOSIS — K65.1 INTRA-ABDOMINAL ABSCESS (HCC): ICD-10-CM

## 2022-02-03 PROCEDURE — 3008F BODY MASS INDEX DOCD: CPT | Performed by: INTERNAL MEDICINE

## 2022-02-03 PROCEDURE — 99214 OFFICE O/P EST MOD 30 MIN: CPT | Performed by: INTERNAL MEDICINE

## 2022-02-03 NOTE — PATIENT INSTRUCTIONS
Discontinue augmentin  Follow up with the [facility] Department  Follow up with Infectious Disease on an as needed basis

## 2022-02-03 NOTE — PROGRESS NOTES
Progress Note - Infectious Disease   Ousmane Carney 46 y o  male MRN: [id number]  Unit/Bed#:  Encounter: [id number]      Impression/Plan:  1  Hepatic abscess-in the setting of recent appendicitis status post appendectomy   The culture appears polymicrobial as expected   He has undergone percutaneous drainage with a drain remaining in place at discharge   1/30 abdomen/pelvis CT showed resolved abscess  Drain was removed yesterday   -discontinue antibiotic (Augmentin)     2  Intra-abdominal/pericolic abscess-in the setting of recent appendicitis status post appendectomy   Patient is status post percutaneous drainage   As expected the culture appears polymicrobial   1/30 abdomen/pelvis CT showed resolved abscess  Drain was removed yesterday   -no further antibiotic, as in above     3  Syphilis-recurrent and recently diagnosed  Patient has not made appointment with [facility] yet  Patient tells me that now that his intra-abdominal infection has resolved, he will make an appointment with [facility] in the next week to get treatment for his syphilis  -patient will need treatment with intramuscular benzathine penicillin as an outpatient and will follow up with the [facility]     4  High risk sexual activity-the patient is on pre exposure prophylaxis with Delicia Clink is clearly not using safe sex practices with recently having recurrent bouts of syphilis  -continue pre exposure Truvada   -treatment of syphilis as above       Discussed with patient in detail regarding the above plan  Follow-up with us p r n        Antibiotics:  Augmentin  Antibiotics 16  Postprocedure day 15      Subjective:  Patient feels well  No abdominal pain  Temperature stays down  No chills  No diarrhea      The following portions of the patient's history were reviewed and updated as appropriate: allergies, current medications, past medical history, past social history, past surgical history and problem list    ROS:  A complete review of systems was done  Except for what is noted above, the rest of the review of systems was negative  Objective:  Vitals:   Temperature: (!) 97 2 °F (36 2 °C)    Physical Exam:     General: Awake, alert, cooperative, no distress  Neck:  Supple  No lymphadenopathy  No mass  Lungs: Expansion symmetric, no rales, no wheezing, respirations unlabored  Heart:  Regular rate and rhythm, S1 and S2 normal, no murmur  Abdomen: Soft, nondistended, non-tender, bowel sounds active all four quadrants,        no masses, no organomegaly  Extremities: No edema  No erythema/warmth  No ulcer  Nontender to palpation  Skin:  No rash  Neuro: Moves all extremities  Invasive Devices  Report    Peripheral Intravenous Line            Peripheral IV 01/30/22 Left Forearm 3 days                Labs studies:   I have personally reviewed pertinent labs  Results from last 7 days   Lab Units 01/30/22  1623   POTASSIUM mmol/L 4 3   CHLORIDE mmol/L 96*   CO2 mmol/L 29   BUN mg/dL 15   CREATININE mg/dL 1 16   EGFR ml/min/1 73sq m 71   CALCIUM mg/dL 9 6   AST U/L 21   ALT U/L 51   ALK PHOS U/L 121*     Results from last 7 days   Lab Units 01/30/22  1623   WBC Thousand/uL 7 78   HEMOGLOBIN g/dL 12 4   HEMATOCRIT % 39 8   PLATELETS Thousands/uL 722*   NEUTROS PCT % 62   MONOS PCT % 8           Imaging Studies:   I have personally reviewed pertinent imaging study reports and images in PACS  1/30 abdomen/pelvis CT reviewed personally  Resolved intra-abdominal abscesses  EKG, Pathology, and Other Studies:   I have personally reviewed pertinent reports

## 2022-03-25 NOTE — UTILIZATION REVIEW
Inpatient Admission Authorization Request   NOTIFICATION OF INPATIENT ADMISSION/INPATIENT AUTHORIZATION REQUEST   SERVICING FACILITY:   [place] [address]  [address] [place], [place] [address] [address]  Tax ID: [id number]  NPI: [id number]  Place of Service: Inpatient [address] [facility] Code: 24     ATTENDING PROVIDER:  Attending Name and NPI#: [facility] [[id number]]  Address: [address] [place] [address]  Phone: [phone]     UTILIZATION REVIEW CONTACT:  Javid Evans, Utilization   Network Utilization Review Department  Phone: [phone]  Fax [phone]  Email: [email or web address]     PHYSICIAN ADVISORY SERVICES:  FOR [facility] REVIEW - MEDICAL NECESSITY DENIAL  Phone: [phone]  Fax: [phone]  Email: [email or web address]     TYPE OF REQUEST:  Inpatient Status     ADMISSION INFORMATION:  ADMISSION DATE/TIME: 1/10/22  7:58 PM  PATIENT DIAGNOSIS CODE/DESCRIPTION:  Acute appendicitis [K35 80]  DISCHARGE DATE/TIME: No discharge date for patient encounter  DISCHARGE DISPOSITION (IF DISCHARGED): [address] [facility]     IMPORTANT INFORMATION:  Please contact the Javid Evans directly with any questions or concerns regarding this request  Department voicemails are confidential     Send requests for admission clinical reviews, concurrent reviews, approvals, and administrative denials due to lack of clinical to fax [phone]  Additional Notes: Patient consent was obtained to proceed with the visit and recommended plan of care after discussion of all risks and benefits, including the risks of COVID-19 exposure. Detail Level: Simple Render Risk Assessment In Note?: no Additional Notes: FRANCISCO J informs pt everything looks good and he is not concerned about any spots, recommends pt returns to office in one year for a skin check.

## 2022-08-29 NOTE — PROGRESS NOTES
Vancomycin Assessment    Noe Cordova is a 46 y o  male who is currently receiving vancomycin vancomycin 1000mg q12h for other liver & abdominal abcess   Relevant clinical data and objective history reviewed:  Creatinine   Date Value Ref Range Status   01/11/2022 1 15 0 60 - 1 30 mg/dL Final     Comment:     Standardized to IDMS reference method   01/10/2022 1 14 0 50 - 1 20 mg/dL Final     Comment:     Standardized to IDMS reference method   08/24/2021 1 55 (H) 0 50 - 1 20 mg/dL Final     Comment:     Standardized to IDMS reference method   09/28/2015 1 07 0 60 - 1 30 mg/dL Final     Comment:     Standardized to IDMS reference method   09/24/2015 1 02 0 60 - 1 30 mg/dL Final     Comment:     Standardized to IDMS reference method   09/21/2015 1 07 0 60 - 1 30 mg/dL Final     Comment:     Standardized to IDMS reference method     /72 (BP Location: Right arm)   Pulse 84   Temp 98 °F (36 7 °C) (Temporal)   Resp 20   Ht 5' 10" (1 778 m)   Wt 81 6 kg (179 lb 14 3 oz)   SpO2 100%   BMI 25 81 kg/m²   No intake/output data recorded  Lab Results   Component Value Date/Time    BUN 10 01/11/2022 06:07 AM    BUN 19 09/28/2015 09:07 AM    WBC 10 16 01/11/2022 06:07 AM    WBC 4 58 09/28/2015 09:07 AM    HGB 12 4 01/11/2022 06:07 AM    HGB 11 6 (L) 09/28/2015 09:07 AM    HCT 38 0 01/11/2022 06:07 AM    HCT 36 1 (L) 09/28/2015 09:07 AM    MCV 86 01/11/2022 06:07 AM    MCV 84 09/28/2015 09:07 AM     01/11/2022 06:07 AM     09/28/2015 09:07 AM     Temp Readings from Last 3 Encounters:   01/19/22 98 °F (36 7 °C) (Temporal)   01/12/22 98 7 °F (37 1 °C)   01/10/22 98 1 °F (36 7 °C) (Oral)     Vancomycin Days of Therapy: 1    Assessment/Plan  The patient is currently on vancomycin utilizing scheduled dosing based on actual body weight  The patient is currently receiving vancomycin 1000mg q12h and after clinical evaluation will be changed to vancomycin 1500mg q12h    Pharmacy will also follow closely for s/sx of nephrotoxicity, infusion reactions, and appropriateness of therapy  BMP and CBC will be ordered per protocol  Plan for trough as patient approaches steady state, prior to the 4th  dose at approximately 1130 1/20  Due to infection severity, will target a trough of 15-20 (appropriate for most indications)   Pharmacy will continue to follow the patients culture results and clinical progress daily      Avani Mccord, Pharmacist No

## 2022-09-26 NOTE — ASSESSMENT & PLAN NOTE
Patient reported some chest pain yesterday  EKG with new t wave inversions  Serial troponins were negative  2D echo was performed yesterday which showed EF of 60% without any regional wall motion abnormalities, grade 1 diastolic dysfunction  Nuclear stress test showed no evidence of ischemia  Patient started on metoprolol 25 milligram p o  B i d  Which was changed to Toprol-XL 25 milligram q h s  [FreeTextEntry1] : UTI symptoms

## 2022-10-12 PROBLEM — A41.9 SEPSIS (HCC): Status: RESOLVED | Noted: 2022-01-19 | Resolved: 2022-10-12

## 2022-12-13 ENCOUNTER — HOSPITAL ENCOUNTER (EMERGENCY)
Facility: HOSPITAL | Age: 52
Discharge: HOME | End: 2022-12-13
Attending: STUDENT IN AN ORGANIZED HEALTH CARE EDUCATION/TRAINING PROGRAM

## 2022-12-13 ENCOUNTER — APPOINTMENT (EMERGENCY)
Dept: ULTRASOUND IMAGING | Facility: HOSPITAL | Age: 52
End: 2022-12-13

## 2022-12-13 VITALS
DIASTOLIC BLOOD PRESSURE: 78 MMHG | BODY MASS INDEX: 27.45 KG/M2 | SYSTOLIC BLOOD PRESSURE: 139 MMHG | TEMPERATURE: 97.5 F | HEART RATE: 91 BPM | OXYGEN SATURATION: 97 % | WEIGHT: 191.3 LBS | RESPIRATION RATE: 16 BRPM

## 2022-12-13 DIAGNOSIS — N50.811 RIGHT TESTICULAR PAIN: Primary | ICD-10-CM

## 2022-12-13 DIAGNOSIS — N50.3 EPIDIDYMAL CYST: ICD-10-CM

## 2022-12-13 LAB
BACTERIA UR QL AUTO: ABNORMAL /HPF
BILIRUB UR QL STRIP: NEGATIVE
CLARITY UR: CLEAR
COLOR UR: ABNORMAL
GLUCOSE UR STRIP-MCNC: NEGATIVE MG/DL
HGB UR QL STRIP.AUTO: NEGATIVE
KETONES UR STRIP-MCNC: NEGATIVE MG/DL
LEUKOCYTE ESTERASE UR QL STRIP: 25
NITRITE UR QL STRIP: NEGATIVE
NON-SQ EPI CELLS URNS QL MICRO: ABNORMAL /HPF
PH UR STRIP.AUTO: 7 [PH]
PROT UR STRIP-MCNC: NEGATIVE MG/DL
RBC #/AREA URNS AUTO: ABNORMAL /HPF
SP GR UR STRIP.AUTO: 1.01 (ref 1–1.04)
UROBILINOGEN UA: NEGATIVE MG/DL
WBC #/AREA URNS AUTO: ABNORMAL /HPF

## 2022-12-13 RX ORDER — BUDESONIDE AND FORMOTEROL FUMARATE DIHYDRATE 80; 4.5 UG/1; UG/1
2 AEROSOL RESPIRATORY (INHALATION) 2 TIMES DAILY
COMMUNITY
Start: 2022-02-14

## 2022-12-13 RX ORDER — KETOROLAC TROMETHAMINE 30 MG/ML
15 INJECTION, SOLUTION INTRAMUSCULAR; INTRAVENOUS ONCE
Status: COMPLETED | OUTPATIENT
Start: 2022-12-13 | End: 2022-12-13

## 2022-12-13 RX ORDER — OXYCODONE HYDROCHLORIDE AND ACETAMINOPHEN 5; 325 MG/1; MG/1
1 TABLET ORAL ONCE
Status: COMPLETED | OUTPATIENT
Start: 2022-12-13 | End: 2022-12-13

## 2022-12-13 RX ORDER — DOXYCYCLINE HYCLATE 100 MG/1
100 CAPSULE ORAL ONCE
Status: COMPLETED | OUTPATIENT
Start: 2022-12-13 | End: 2022-12-13

## 2022-12-13 RX ORDER — NAPROXEN 500 MG/1
500 TABLET ORAL 2 TIMES DAILY WITH MEALS
Qty: 30 TABLET | Refills: 0 | Status: SHIPPED | OUTPATIENT
Start: 2022-12-13

## 2022-12-13 RX ORDER — DOXYCYCLINE HYCLATE 100 MG/1
100 CAPSULE ORAL 2 TIMES DAILY
Qty: 20 CAPSULE | Refills: 0 | Status: SHIPPED | OUTPATIENT
Start: 2022-12-13 | End: 2022-12-23

## 2022-12-13 RX ORDER — EMTRICITABINE AND TENOFOVIR ALAFENAMIDE 200; 25 MG/1; MG/1
TABLET ORAL
COMMUNITY
Start: 2022-10-12

## 2022-12-13 RX ORDER — ACETAMINOPHEN 325 MG/1
650 TABLET ORAL ONCE
Status: COMPLETED | OUTPATIENT
Start: 2022-12-13 | End: 2022-12-13

## 2022-12-13 RX ORDER — TRAMADOL HYDROCHLORIDE 50 MG/1
50 TABLET ORAL EVERY 6 HOURS PRN
Qty: 10 TABLET | Refills: 0 | Status: SHIPPED | OUTPATIENT
Start: 2022-12-13 | End: 2022-12-23

## 2022-12-13 RX ADMIN — ACETAMINOPHEN 650 MG: 325 TABLET ORAL at 21:10

## 2022-12-13 RX ADMIN — KETOROLAC TROMETHAMINE 15 MG: 30 INJECTION, SOLUTION INTRAMUSCULAR; INTRAVENOUS at 21:11

## 2022-12-13 RX ADMIN — LIDOCAINE HYDROCHLORIDE 500 MG: 10 INJECTION, SOLUTION EPIDURAL; INFILTRATION; INTRACAUDAL; PERINEURAL at 21:17

## 2022-12-13 RX ADMIN — DOXYCYCLINE 100 MG: 100 CAPSULE ORAL at 21:17

## 2022-12-13 RX ADMIN — OXYCODONE HYDROCHLORIDE AND ACETAMINOPHEN 1 TABLET: 5; 325 TABLET ORAL at 22:58

## 2022-12-14 LAB
C TRACH DNA SPEC QL NAA+PROBE: NEGATIVE
N GONORRHOEA DNA SPEC QL NAA+PROBE: NEGATIVE

## 2022-12-14 NOTE — DISCHARGE INSTRUCTIONS
Please follow up with urology  I have provided you with a referral  Use medication as prescribed  Please do not drive, operate heavy machinery, consume alcohol under the influence of Tramadol  Please return to the ER with any worsening symptoms
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
## 2023-10-05 NOTE — PLAN OF CARE

## 2025-06-30 NOTE — ASSESSMENT & PLAN NOTE
Patient complained of some chest tightness with walking with physical therapy  No wheezing on exam  Continue albuterol nebulizers p r n  Detail Level: Detailed Detail Level: Zone

## (undated) DEVICE — ENDOPATH ECHELON VASCULAR  RELOADS, WHITE, 35MM: Brand: ECHELON ENDOPATH

## (undated) DEVICE — LATEX FREE 10 FT  INSUFFLATION TUBING, COLDER CONNECTOR WITH 1 MICRON FILTER STERILE ONE TIME USE, 20 U: Brand: SURGICAL DIRECT

## (undated) DEVICE — DRAPE UTILITY

## (undated) DEVICE — TROCAR: Brand: KII® SLEEVE

## (undated) DEVICE — TROCAR: Brand: KII FIOS FIRST ENTRY

## (undated) DEVICE — CHLORAPREP HI-LITE 26ML ORANGE

## (undated) DEVICE — PACK GENERAL LF

## (undated) DEVICE — TISSUE RETRIEVAL SYSTEM: Brand: INZII RETRIEVAL SYSTEM

## (undated) DEVICE — DRAPE LAPAROTOMY W/POUCHES

## (undated) DEVICE — TROCARS: Brand: KII® BALLOON BLUNT TIP SYSTEM

## (undated) DEVICE — 3M™ TEGADERM™ TRANSPARENT FILM DRESSING FRAME STYLE, 1626W, 4 IN X 4-3/4 IN (10 CM X 12 CM), 50/CT 4CT/CASE: Brand: 3M™ TEGADERM™

## (undated) DEVICE — ANTI-FOG SOLUTION WITH FOAM PAD: Brand: DEVON

## (undated) DEVICE — IRRIG ENDO FLO TUBING

## (undated) DEVICE — BASIC DOUBLE BASIN 2-LF: Brand: MEDLINE INDUSTRIES, INC.

## (undated) DEVICE — ASTOUND STANDARD SURGICAL GOWN, XL: Brand: CONVERTORS

## (undated) DEVICE — ECHELON FLEX  POWERED VASCULAR STAPLER WITH ADVANCED PLACEMENT TIP, 35MM: Brand: ECHELON FLEX

## (undated) DEVICE — SUT VICRYL PLUS 0 CT-3 27 IN VCP329H

## (undated) DEVICE — HARMONIC 1100 SHEARS, 36CM SHAFT LENGTH: Brand: HARMONIC

## (undated) DEVICE — ADHESIVE SKIN HIGH VISCOSITY EXOFIN 1ML

## (undated) DEVICE — SUT MONOCRYL 4-0 PC-5 18 IN Y823G

## (undated) DEVICE — TRAY FOLEY 16FR URIMETER SURESTEP

## (undated) DEVICE — 5 MM BABCOCKS WITH RATCHET HANDLES: Brand: ENDOPATH